# Patient Record
Sex: MALE | Race: WHITE | NOT HISPANIC OR LATINO | Employment: OTHER | URBAN - METROPOLITAN AREA
[De-identification: names, ages, dates, MRNs, and addresses within clinical notes are randomized per-mention and may not be internally consistent; named-entity substitution may affect disease eponyms.]

---

## 2017-11-17 ENCOUNTER — OFFICE VISIT (OUTPATIENT)
Dept: MEDICAL GROUP | Facility: PHYSICIAN GROUP | Age: 67
End: 2017-11-17
Payer: COMMERCIAL

## 2017-11-17 VITALS
DIASTOLIC BLOOD PRESSURE: 64 MMHG | BODY MASS INDEX: 32.62 KG/M2 | OXYGEN SATURATION: 97 % | WEIGHT: 233 LBS | HEART RATE: 72 BPM | RESPIRATION RATE: 16 BRPM | TEMPERATURE: 98.6 F | HEIGHT: 71 IN | SYSTOLIC BLOOD PRESSURE: 130 MMHG

## 2017-11-17 DIAGNOSIS — E78.00 PURE HYPERCHOLESTEROLEMIA: ICD-10-CM

## 2017-11-17 DIAGNOSIS — Z13.1 SCREENING FOR DIABETES MELLITUS: ICD-10-CM

## 2017-11-17 DIAGNOSIS — M21.619 BUNION OF GREAT TOE: ICD-10-CM

## 2017-11-17 DIAGNOSIS — G47.9 DIFFICULTY SLEEPING: ICD-10-CM

## 2017-11-17 DIAGNOSIS — M19.90 ARTHRITIS: ICD-10-CM

## 2017-11-17 DIAGNOSIS — Z87.828 HISTORY OF TRAUMA: ICD-10-CM

## 2017-11-17 DIAGNOSIS — R63.5 ABNORMAL WEIGHT GAIN: ICD-10-CM

## 2017-11-17 DIAGNOSIS — E66.9 OBESITY (BMI 30-39.9): ICD-10-CM

## 2017-11-17 PROCEDURE — 99203 OFFICE O/P NEW LOW 30 MIN: CPT | Performed by: FAMILY MEDICINE

## 2017-11-17 RX ORDER — SILDENAFIL 50 MG/1
50 TABLET, FILM COATED ORAL PRN
COMMUNITY
End: 2019-10-02

## 2017-11-17 RX ORDER — DIPHENHYDRAMINE HCL 25 MG
25 TABLET ORAL EVERY 6 HOURS PRN
COMMUNITY
End: 2019-12-07

## 2017-11-17 ASSESSMENT — PAIN SCALES - GENERAL: PAINLEVEL: NO PAIN

## 2017-11-17 ASSESSMENT — PATIENT HEALTH QUESTIONNAIRE - PHQ9: CLINICAL INTERPRETATION OF PHQ2 SCORE: 0

## 2017-11-17 NOTE — ASSESSMENT & PLAN NOTE
"We discussed patient's weight today. He tells me he has gained ~20-25 pounds in the last year. Current weight is 233 pounds and BMI is 32.5.  Patient goals: Ideal weight 210 pounds.  Diet: \"I eat like a teenager.\" He is not following a specific diet plain.  Exercise: Elliptical 30 minutes twice a week.  "
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
## 2017-11-17 NOTE — ASSESSMENT & PLAN NOTE
This is a chronic and stable issue for the patient. He often has difficulty sleeping due to pain. He will take diphenhydramine nightly to help. No grogginess the next day.

## 2017-11-17 NOTE — ASSESSMENT & PLAN NOTE
At the end of the visit, patient mentioned that he has a bunion that is getting larger on his left foot. He also has a smaller one on his right foot. Due to his previous injuries and surgeries, he does have some deformity of his ankle. He does not have significant pain at this time and like to avoid surgery for his long as possible. In the past, he has seen Dr. Blanc in regards to this issue.

## 2017-11-17 NOTE — LETTER
[facility]  Renita Guerra M.D.  [address]  [place] [address]  Fax: [phone]   Authorization for Release/Disclosure of   Protected Health Information   Name: ROB CLIFTON : 1950 SSN: [id number]   Address: [address] [place] [address] [address] [place] [address] Phone:    [phone] (home)    I authorize the entity listed below to release/disclose the PHI below to:   [facility]/Renita Guerra M.D. and Renita Guerra M.D.   Provider or Entity Name:  Dr. Candelario  [facility]    Address   City, State, Zip   Phone:      Fax:     Reason for request: continuity of care   Information to be released:    [  ] LAST COLONOSCOPY,  including any PATH REPORT and follow-up  [  ] LAST FIT/COLOGUARD RESULT [  ] LAST DEXA  [  ] LAST MAMMOGRAM  [  ] LAST PAP  [  ] LAST LABS [  ] RETINA EXAM REPORT  [  ] IMMUNIZATION RECORDS  [  ] Release all info      [  ] Check here and initial the line next to each item to release ALL health information INCLUDING  _____ Care and treatment for drug and / or alcohol abuse  _____ HIV testing, infection status, or AIDS  _____ Genetic Testing    DATES OF SERVICE OR TIME PERIOD TO BE DISCLOSED: _____________  I understand and acknowledge that:  * This Authorization may be revoked at any time by you in writing, except if your health information has already been used or disclosed.  * Your health information that will be used or disclosed as a result of you signing this authorization could be re-disclosed by the recipient. If this occurs, your re-disclosed health information may no longer be protected by State or Federal laws.  * You may refuse to sign this Authorization. Your refusal will not affect your ability to obtain treatment.  * This Authorization becomes effective upon signing and will  on (date) __________.      If no date is indicated, this Authorization will  one (1) year from the signature date.    Name: Rob Clifton    Signature:   Date:     2017            PLEASE FAX REQUESTED RECORDS BACK TO: [phone]

## 2017-11-17 NOTE — ASSESSMENT & PLAN NOTE
Per patient history. His cholesterol numbers have been slightly elevated in the past. Not currently on medication. No personal history of heart attack or stroke.

## 2017-11-30 ENCOUNTER — HOSPITAL ENCOUNTER (OUTPATIENT)
Dept: LAB | Facility: MEDICAL CENTER | Age: 67
End: 2017-11-30
Attending: FAMILY MEDICINE
Payer: COMMERCIAL

## 2017-11-30 DIAGNOSIS — E78.00 PURE HYPERCHOLESTEROLEMIA: ICD-10-CM

## 2017-11-30 DIAGNOSIS — R63.5 ABNORMAL WEIGHT GAIN: ICD-10-CM

## 2017-11-30 DIAGNOSIS — Z13.1 SCREENING FOR DIABETES MELLITUS: ICD-10-CM

## 2017-11-30 DIAGNOSIS — E66.9 OBESITY (BMI 30-39.9): ICD-10-CM

## 2017-11-30 LAB
ALBUMIN SERPL BCP-MCNC: 4 G/DL (ref 3.2–4.9)
ALBUMIN/GLOB SERPL: 1.4 G/DL
ALP SERPL-CCNC: 51 U/L (ref 30–99)
ALT SERPL-CCNC: 15 U/L (ref 2–50)
ANION GAP SERPL CALC-SCNC: 8 MMOL/L (ref 0–11.9)
AST SERPL-CCNC: 20 U/L (ref 12–45)
BILIRUB SERPL-MCNC: 1 MG/DL (ref 0.1–1.5)
BUN SERPL-MCNC: 21 MG/DL (ref 8–22)
CALCIUM SERPL-MCNC: 9.2 MG/DL (ref 8.5–10.5)
CHLORIDE SERPL-SCNC: 104 MMOL/L (ref 96–112)
CHOLEST SERPL-MCNC: 200 MG/DL (ref 100–199)
CO2 SERPL-SCNC: 28 MMOL/L (ref 20–33)
CREAT SERPL-MCNC: 1.16 MG/DL (ref 0.5–1.4)
GFR SERPL CREATININE-BSD FRML MDRD: >60 ML/MIN/1.73 M 2
GLOBULIN SER CALC-MCNC: 2.9 G/DL (ref 1.9–3.5)
GLUCOSE SERPL-MCNC: 92 MG/DL (ref 65–99)
HDLC SERPL-MCNC: 44 MG/DL
LDLC SERPL CALC-MCNC: 121 MG/DL
POTASSIUM SERPL-SCNC: 3.9 MMOL/L (ref 3.6–5.5)
PROT SERPL-MCNC: 6.9 G/DL (ref 6–8.2)
SODIUM SERPL-SCNC: 140 MMOL/L (ref 135–145)
TRIGL SERPL-MCNC: 176 MG/DL (ref 0–149)

## 2017-11-30 PROCEDURE — 80061 LIPID PANEL: CPT

## 2017-11-30 PROCEDURE — 36415 COLL VENOUS BLD VENIPUNCTURE: CPT

## 2017-11-30 PROCEDURE — 80053 COMPREHEN METABOLIC PANEL: CPT

## 2018-05-23 ENCOUNTER — OFFICE VISIT (OUTPATIENT)
Dept: MEDICAL GROUP | Facility: PHYSICIAN GROUP | Age: 68
End: 2018-05-23
Payer: COMMERCIAL

## 2018-05-23 VITALS
BODY MASS INDEX: 33.46 KG/M2 | DIASTOLIC BLOOD PRESSURE: 78 MMHG | HEIGHT: 71 IN | OXYGEN SATURATION: 97 % | HEART RATE: 76 BPM | TEMPERATURE: 98.6 F | WEIGHT: 239 LBS | SYSTOLIC BLOOD PRESSURE: 122 MMHG | RESPIRATION RATE: 18 BRPM

## 2018-05-23 DIAGNOSIS — R20.0 NUMBNESS AND TINGLING OF BOTH FEET: ICD-10-CM

## 2018-05-23 DIAGNOSIS — E66.9 OBESITY (BMI 30-39.9): ICD-10-CM

## 2018-05-23 DIAGNOSIS — Z87.828 HISTORY OF TRAUMA: ICD-10-CM

## 2018-05-23 DIAGNOSIS — R20.2 NUMBNESS AND TINGLING OF BOTH FEET: ICD-10-CM

## 2018-05-23 DIAGNOSIS — Z87.81 HISTORY OF FRACTURE OF LOWER EXTREMITY: ICD-10-CM

## 2018-05-23 PROCEDURE — 99214 OFFICE O/P EST MOD 30 MIN: CPT | Performed by: FAMILY MEDICINE

## 2018-05-23 ASSESSMENT — PAIN SCALES - GENERAL: PAINLEVEL: 3=SLIGHT PAIN
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
## 2018-05-31 ENCOUNTER — HOSPITAL ENCOUNTER (OUTPATIENT)
Dept: LAB | Facility: MEDICAL CENTER | Age: 68
End: 2018-05-31
Attending: FAMILY MEDICINE
Payer: COMMERCIAL

## 2018-05-31 DIAGNOSIS — R20.2 NUMBNESS AND TINGLING OF BOTH FEET: ICD-10-CM

## 2018-05-31 DIAGNOSIS — R20.0 NUMBNESS AND TINGLING OF BOTH FEET: ICD-10-CM

## 2018-05-31 PROBLEM — E53.8 VITAMIN B12 DEFICIENCY: Status: ACTIVE | Noted: 2018-05-31

## 2018-05-31 LAB
ALBUMIN SERPL BCP-MCNC: 4.2 G/DL (ref 3.2–4.9)
ALBUMIN/GLOB SERPL: 1.4 G/DL
ALP SERPL-CCNC: 49 U/L (ref 30–99)
ALT SERPL-CCNC: 14 U/L (ref 2–50)
ANION GAP SERPL CALC-SCNC: 8 MMOL/L (ref 0–11.9)
AST SERPL-CCNC: 16 U/L (ref 12–45)
BASOPHILS # BLD AUTO: 1.2 % (ref 0–1.8)
BASOPHILS # BLD: 0.08 K/UL (ref 0–0.12)
BILIRUB SERPL-MCNC: 1.1 MG/DL (ref 0.1–1.5)
BUN SERPL-MCNC: 19 MG/DL (ref 8–22)
CALCIUM SERPL-MCNC: 9 MG/DL (ref 8.5–10.5)
CHLORIDE SERPL-SCNC: 105 MMOL/L (ref 96–112)
CO2 SERPL-SCNC: 25 MMOL/L (ref 20–33)
CREAT SERPL-MCNC: 1.14 MG/DL (ref 0.5–1.4)
EOSINOPHIL # BLD AUTO: 0.15 K/UL (ref 0–0.51)
EOSINOPHIL NFR BLD: 2.2 % (ref 0–6.9)
ERYTHROCYTE [DISTWIDTH] IN BLOOD BY AUTOMATED COUNT: 41.1 FL (ref 35.9–50)
GLOBULIN SER CALC-MCNC: 2.9 G/DL (ref 1.9–3.5)
GLUCOSE SERPL-MCNC: 84 MG/DL (ref 65–99)
HCT VFR BLD AUTO: 47.6 % (ref 42–52)
HGB BLD-MCNC: 15.9 G/DL (ref 14–18)
IMM GRANULOCYTES # BLD AUTO: 0.03 K/UL (ref 0–0.11)
IMM GRANULOCYTES NFR BLD AUTO: 0.4 % (ref 0–0.9)
IRON SERPL-MCNC: 114 UG/DL (ref 50–180)
LYMPHOCYTES # BLD AUTO: 1.97 K/UL (ref 1–4.8)
LYMPHOCYTES NFR BLD: 29.3 % (ref 22–41)
MCH RBC QN AUTO: 29.6 PG (ref 27–33)
MCHC RBC AUTO-ENTMCNC: 33.4 G/DL (ref 33.7–35.3)
MCV RBC AUTO: 88.6 FL (ref 81.4–97.8)
MONOCYTES # BLD AUTO: 0.61 K/UL (ref 0–0.85)
MONOCYTES NFR BLD AUTO: 9.1 % (ref 0–13.4)
NEUTROPHILS # BLD AUTO: 3.89 K/UL (ref 1.82–7.42)
NEUTROPHILS NFR BLD: 57.8 % (ref 44–72)
NRBC # BLD AUTO: 0 K/UL
NRBC BLD-RTO: 0 /100 WBC
PLATELET # BLD AUTO: 266 K/UL (ref 164–446)
PMV BLD AUTO: 10.9 FL (ref 9–12.9)
POTASSIUM SERPL-SCNC: 4 MMOL/L (ref 3.6–5.5)
PROT SERPL-MCNC: 7.1 G/DL (ref 6–8.2)
RBC # BLD AUTO: 5.37 M/UL (ref 4.7–6.1)
SODIUM SERPL-SCNC: 138 MMOL/L (ref 135–145)
VIT B12 SERPL-MCNC: 178 PG/ML (ref 211–911)
WBC # BLD AUTO: 6.7 K/UL (ref 4.8–10.8)

## 2018-05-31 PROCEDURE — 83540 ASSAY OF IRON: CPT

## 2018-05-31 PROCEDURE — 85025 COMPLETE CBC W/AUTO DIFF WBC: CPT

## 2018-05-31 PROCEDURE — 80053 COMPREHEN METABOLIC PANEL: CPT

## 2018-05-31 PROCEDURE — 36415 COLL VENOUS BLD VENIPUNCTURE: CPT

## 2018-05-31 PROCEDURE — 82607 VITAMIN B-12: CPT

## 2018-06-15 ENCOUNTER — HOSPITAL ENCOUNTER (OUTPATIENT)
Dept: RADIOLOGY | Facility: MEDICAL CENTER | Age: 68
End: 2018-06-15
Attending: FAMILY MEDICINE
Payer: COMMERCIAL

## 2018-06-15 ENCOUNTER — TELEPHONE (OUTPATIENT)
Dept: MEDICAL GROUP | Facility: PHYSICIAN GROUP | Age: 68
End: 2018-06-15

## 2018-06-15 DIAGNOSIS — Z87.81 HISTORY OF FRACTURE OF LOWER EXTREMITY: ICD-10-CM

## 2018-06-15 DIAGNOSIS — Z87.828 HISTORY OF TRAUMA: ICD-10-CM

## 2018-06-15 DIAGNOSIS — R20.0 NUMBNESS AND TINGLING OF BOTH FEET: ICD-10-CM

## 2018-06-15 DIAGNOSIS — R20.2 NUMBNESS AND TINGLING OF BOTH FEET: ICD-10-CM

## 2018-06-15 PROCEDURE — 73630 X-RAY EXAM OF FOOT: CPT | Mod: RT

## 2018-06-15 PROCEDURE — 73590 X-RAY EXAM OF LOWER LEG: CPT | Mod: LT

## 2018-06-15 PROCEDURE — 73610 X-RAY EXAM OF ANKLE: CPT | Mod: LT

## 2018-06-25 ENCOUNTER — OFFICE VISIT (OUTPATIENT)
Dept: MEDICAL GROUP | Facility: PHYSICIAN GROUP | Age: 68
End: 2018-06-25
Payer: COMMERCIAL

## 2018-06-25 VITALS
SYSTOLIC BLOOD PRESSURE: 98 MMHG | RESPIRATION RATE: 16 BRPM | WEIGHT: 236 LBS | HEART RATE: 82 BPM | BODY MASS INDEX: 33.04 KG/M2 | TEMPERATURE: 98.6 F | OXYGEN SATURATION: 97 % | HEIGHT: 71 IN | DIASTOLIC BLOOD PRESSURE: 70 MMHG

## 2018-06-25 DIAGNOSIS — D16.9 ENCHONDROMA OF BONE: ICD-10-CM

## 2018-06-25 DIAGNOSIS — M19.90 ARTHRITIS: ICD-10-CM

## 2018-06-25 DIAGNOSIS — S82.491S: ICD-10-CM

## 2018-06-25 DIAGNOSIS — Z87.828 HISTORY OF TRAUMA: ICD-10-CM

## 2018-06-25 DIAGNOSIS — R20.0 NUMBNESS AND TINGLING OF BOTH FEET: ICD-10-CM

## 2018-06-25 DIAGNOSIS — M79.605 PAIN OF LEFT LOWER EXTREMITY: ICD-10-CM

## 2018-06-25 DIAGNOSIS — T84.84XA PAINFUL ORTHOPAEDIC HARDWARE (HCC): ICD-10-CM

## 2018-06-25 DIAGNOSIS — R20.2 NUMBNESS AND TINGLING OF BOTH FEET: ICD-10-CM

## 2018-06-25 DIAGNOSIS — Z87.81 HISTORY OF FRACTURE OF LOWER EXTREMITY: ICD-10-CM

## 2018-06-25 PROCEDURE — 99214 OFFICE O/P EST MOD 30 MIN: CPT | Performed by: FAMILY MEDICINE

## 2018-06-25 RX ORDER — CHOLECALCIFEROL (VITAMIN D3) 25 MCG
TABLET,CHEWABLE ORAL
COMMUNITY
End: 2019-12-07

## 2018-06-25 ASSESSMENT — PAIN SCALES - GENERAL: PAINLEVEL: NO PAIN
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
## 2018-11-30 ENCOUNTER — HOSPITAL ENCOUNTER (OUTPATIENT)
Dept: LAB | Facility: MEDICAL CENTER | Age: 68
End: 2018-11-30
Attending: FAMILY MEDICINE
Payer: COMMERCIAL

## 2018-11-30 ENCOUNTER — OFFICE VISIT (OUTPATIENT)
Dept: MEDICAL GROUP | Facility: PHYSICIAN GROUP | Age: 68
End: 2018-11-30
Payer: COMMERCIAL

## 2018-11-30 VITALS
DIASTOLIC BLOOD PRESSURE: 84 MMHG | TEMPERATURE: 99.1 F | HEIGHT: 71 IN | RESPIRATION RATE: 14 BRPM | BODY MASS INDEX: 29.4 KG/M2 | SYSTOLIC BLOOD PRESSURE: 122 MMHG | WEIGHT: 210 LBS | HEART RATE: 76 BPM | OXYGEN SATURATION: 98 %

## 2018-11-30 DIAGNOSIS — E53.8 VITAMIN B12 DEFICIENCY: ICD-10-CM

## 2018-11-30 DIAGNOSIS — Z00.00 ENCOUNTER FOR PREVENTATIVE ADULT HEALTH CARE EXAMINATION: ICD-10-CM

## 2018-11-30 DIAGNOSIS — J34.89 LESION OF NOSE: ICD-10-CM

## 2018-11-30 DIAGNOSIS — Z23 NEED FOR VACCINATION: ICD-10-CM

## 2018-11-30 PROBLEM — E66.9 OBESITY (BMI 30-39.9): Status: RESOLVED | Noted: 2017-11-17 | Resolved: 2018-11-30

## 2018-11-30 PROBLEM — R63.5 ABNORMAL WEIGHT GAIN: Status: RESOLVED | Noted: 2017-11-17 | Resolved: 2018-11-30

## 2018-11-30 PROBLEM — G47.9 DIFFICULTY SLEEPING: Status: RESOLVED | Noted: 2017-11-17 | Resolved: 2018-11-30

## 2018-11-30 LAB
ALBUMIN SERPL BCP-MCNC: 4.3 G/DL (ref 3.2–4.9)
ALBUMIN/GLOB SERPL: 1.6 G/DL
ALP SERPL-CCNC: 52 U/L (ref 30–99)
ALT SERPL-CCNC: 13 U/L (ref 2–50)
ANION GAP SERPL CALC-SCNC: 5 MMOL/L (ref 0–11.9)
AST SERPL-CCNC: 15 U/L (ref 12–45)
BILIRUB SERPL-MCNC: 1 MG/DL (ref 0.1–1.5)
BUN SERPL-MCNC: 19 MG/DL (ref 8–22)
CALCIUM SERPL-MCNC: 9.1 MG/DL (ref 8.5–10.5)
CHLORIDE SERPL-SCNC: 105 MMOL/L (ref 96–112)
CHOLEST SERPL-MCNC: 180 MG/DL (ref 100–199)
CO2 SERPL-SCNC: 29 MMOL/L (ref 20–33)
CREAT SERPL-MCNC: 0.76 MG/DL (ref 0.5–1.4)
FASTING STATUS PATIENT QL REPORTED: NORMAL
GLOBULIN SER CALC-MCNC: 2.7 G/DL (ref 1.9–3.5)
GLUCOSE SERPL-MCNC: 84 MG/DL (ref 65–99)
HDLC SERPL-MCNC: 42 MG/DL
LDLC SERPL CALC-MCNC: 116 MG/DL
POTASSIUM SERPL-SCNC: 4.3 MMOL/L (ref 3.6–5.5)
PROT SERPL-MCNC: 7 G/DL (ref 6–8.2)
SODIUM SERPL-SCNC: 139 MMOL/L (ref 135–145)
TRIGL SERPL-MCNC: 110 MG/DL (ref 0–149)
VIT B12 SERPL-MCNC: 559 PG/ML (ref 211–911)

## 2018-11-30 PROCEDURE — 90670 PCV13 VACCINE IM: CPT | Performed by: FAMILY MEDICINE

## 2018-11-30 PROCEDURE — 90662 IIV NO PRSV INCREASED AG IM: CPT | Performed by: FAMILY MEDICINE

## 2018-11-30 PROCEDURE — 82607 VITAMIN B-12: CPT

## 2018-11-30 PROCEDURE — 80053 COMPREHEN METABOLIC PANEL: CPT

## 2018-11-30 PROCEDURE — 80061 LIPID PANEL: CPT

## 2018-11-30 PROCEDURE — 90471 IMMUNIZATION ADMIN: CPT | Performed by: FAMILY MEDICINE

## 2018-11-30 PROCEDURE — 99397 PER PM REEVAL EST PAT 65+ YR: CPT | Mod: 25 | Performed by: FAMILY MEDICINE

## 2018-11-30 PROCEDURE — 36415 COLL VENOUS BLD VENIPUNCTURE: CPT

## 2018-11-30 PROCEDURE — 90472 IMMUNIZATION ADMIN EACH ADD: CPT | Performed by: FAMILY MEDICINE

## 2018-11-30 ASSESSMENT — PATIENT HEALTH QUESTIONNAIRE - PHQ9: CLINICAL INTERPRETATION OF PHQ2 SCORE: 0
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
## 2019-10-01 ENCOUNTER — PATIENT MESSAGE (OUTPATIENT)
Dept: MEDICAL GROUP | Facility: PHYSICIAN GROUP | Age: 69
End: 2019-10-01

## 2019-10-01 DIAGNOSIS — N52.8 OTHER MALE ERECTILE DYSFUNCTION: ICD-10-CM

## 2019-10-02 RX ORDER — SILDENAFIL 100 MG/1
100 TABLET, FILM COATED ORAL PRN
Qty: 100 TAB | Refills: 0 | Status: SHIPPED | OUTPATIENT
Start: 2019-10-02 | End: 2019-10-21 | Stop reason: SDUPTHER

## 2019-10-18 ENCOUNTER — TELEPHONE (OUTPATIENT)
Dept: MEDICAL GROUP | Facility: PHYSICIAN GROUP | Age: 69
End: 2019-10-18

## 2019-10-18 NOTE — TELEPHONE ENCOUNTER
Pharmacy called requesting a faxed Rx for viagra for pt.  Dr. Guerra had discussed this Rx with pt, and left a script at the front of the office for pt to .  It was picked up.    Called and left VM for pt to call back in regards to this Rx, he will need to drop off the old script for us to fax a new script to pharmacy.

## 2019-10-21 ENCOUNTER — TELEPHONE (OUTPATIENT)
Dept: MEDICAL GROUP | Facility: PHYSICIAN GROUP | Age: 69
End: 2019-10-21

## 2019-10-21 DIAGNOSIS — N52.8 OTHER MALE ERECTILE DYSFUNCTION: ICD-10-CM

## 2019-10-21 RX ORDER — SILDENAFIL 100 MG/1
100 TABLET, FILM COATED ORAL PRN
Qty: 100 TAB | Refills: 0 | Status: SHIPPED
Start: 2019-10-21 | End: 2020-10-13 | Stop reason: SDUPTHER

## 2019-10-21 NOTE — TELEPHONE ENCOUNTER
Received a call from [facility], wanting a new Rx faxed over to fill for pt.      Pt has already picked up previous Rx and has that on hand.    Pt was advised that he would need to bring Rx back, if he is wanting to use [facility] and needing us to fax a new Rx to them. Wife brought in old prescription to return and was advised by Rahel to keep it.

## 2019-10-21 NOTE — TELEPHONE ENCOUNTER
Okay to reprint prescription and send in.  Please fax to [facility].  -Renita Guerra M.D.

## 2019-10-21 NOTE — TELEPHONE ENCOUNTER
I'm not familiar with [facility] and have not seen any documentation from them.  Can we call the number to see what is going on?  Patient picked up the prescription for Viagra last week or the week prior.  -Renita Guerra M.D.

## 2019-10-21 NOTE — TELEPHONE ENCOUNTER
Pt wife is here because the pharmacy will not fill the prescription for viagra.  needs to contact the company at [facility] [phone] or Fax at [phone] they will not fill the recent one until the Dr gives them a call. Please call pt with any questions OK to leave a detailed message if no answer. Pt does not want to use any other pharmacy

## 2019-12-06 ENCOUNTER — OFFICE VISIT (OUTPATIENT)
Dept: MEDICAL GROUP | Facility: PHYSICIAN GROUP | Age: 69
End: 2019-12-06
Payer: COMMERCIAL

## 2019-12-06 VITALS
OXYGEN SATURATION: 98 % | RESPIRATION RATE: 16 BRPM | HEART RATE: 70 BPM | SYSTOLIC BLOOD PRESSURE: 102 MMHG | TEMPERATURE: 97.5 F | DIASTOLIC BLOOD PRESSURE: 78 MMHG | WEIGHT: 199.4 LBS | BODY MASS INDEX: 27.92 KG/M2 | HEIGHT: 71 IN

## 2019-12-06 DIAGNOSIS — Z13.220 SCREENING FOR LIPID DISORDERS: ICD-10-CM

## 2019-12-06 DIAGNOSIS — Z23 NEED FOR VACCINATION: ICD-10-CM

## 2019-12-06 DIAGNOSIS — Z00.00 ENCOUNTER FOR PREVENTATIVE ADULT HEALTH CARE EXAMINATION: ICD-10-CM

## 2019-12-06 DIAGNOSIS — Z13.1 SCREENING FOR DIABETES MELLITUS: ICD-10-CM

## 2019-12-06 DIAGNOSIS — H04.123 DRY EYE SYNDROME OF BOTH EYES: ICD-10-CM

## 2019-12-06 DIAGNOSIS — Z11.59 ENCOUNTER FOR HEPATITIS C SCREENING TEST FOR LOW RISK PATIENT: ICD-10-CM

## 2019-12-06 DIAGNOSIS — Z87.19 HISTORY OF HEMORRHOIDS: ICD-10-CM

## 2019-12-06 DIAGNOSIS — E53.8 VITAMIN B12 DEFICIENCY: ICD-10-CM

## 2019-12-06 PROCEDURE — 90662 IIV NO PRSV INCREASED AG IM: CPT | Performed by: FAMILY MEDICINE

## 2019-12-06 PROCEDURE — 99397 PER PM REEVAL EST PAT 65+ YR: CPT | Mod: 25 | Performed by: FAMILY MEDICINE

## 2019-12-06 PROCEDURE — 90732 PPSV23 VACC 2 YRS+ SUBQ/IM: CPT | Performed by: FAMILY MEDICINE

## 2019-12-06 PROCEDURE — 90472 IMMUNIZATION ADMIN EACH ADD: CPT | Performed by: FAMILY MEDICINE

## 2019-12-06 PROCEDURE — 90471 IMMUNIZATION ADMIN: CPT | Performed by: FAMILY MEDICINE

## 2019-12-06 ASSESSMENT — PATIENT HEALTH QUESTIONNAIRE - PHQ9: CLINICAL INTERPRETATION OF PHQ2 SCORE: 0

## 2019-12-06 NOTE — PROGRESS NOTES
Subjective:     CC:   Chief Complaint   Patient presents with   • Annual Exam     HPI:   Rob Clifton is a 69 y.o. male who presents for an annual exam. He is feeling well and has no complaints.    Encounter for preventative adult health care examination  Last colonoscopy: 07/2015 with [facility] revealing diverticulosis in the entire examined colon. Non-bleeding internal hemorrhoids. Repeat in ten years.  Last Tdap: 2014   Hx STDs: Denies.  Recent STD test: No  Regular exercise: Yes  Diet: Patient is following the Weight Watchers program and has reportedly lost approximately 47 lbs!    History of hemorrhoids  The patient experienced an episode of hemorrhoids described as nontender swelling around his anus several months ago. The swelling resolved without intervention after two months. Patient denies constipation or straining during bowel movements prior to the development of his symptoms. No prior history of hemorrhoids.    Dry eye syndrome of both eyes  The patient is being monitored by Optometry for a history of dry eyes. He is treating his symptoms with daily eye drops. He otherwise denies vision changes, eye redness or discharge.    Vitamin B12 deficiency  Patient is monitored for a prior history of vitamin B12 deficiency noted on his labs dated 05/31/18. He reportedly takes a daily B12 supplement and is asymptomatic.    He  has a past medical history of Achilles tendon infection, Arthritis, and Kidney stones.  He  has a past surgical history that includes other orthopedic surgery.  Family History   Problem Relation Age of Onset   • No Known Problems Mother    • No Known Problems Father    • Other Maternal Grandmother         Possibly TB   • No Known Problems Maternal Grandfather    • No Known Problems Paternal Grandmother    • No Known Problems Paternal Grandfather    • Heart Disease Neg Hx    • Cancer Neg Hx    • Diabetes Neg Hx    • Stroke Neg Hx      Social History     Tobacco Use   •  "Smoking status: Never Smoker   • Smokeless tobacco: Never Used   Substance Use Topics   • Alcohol use: Yes     Alcohol/week: 1.2 oz     Types: 2 Glasses of wine per week   • Drug use: Yes     Types: Marijuana     Comment: Rare     Patient Active Problem List    Diagnosis Date Noted   • Vitamin B12 deficiency 05/31/2018   • Pure hypercholesterolemia 11/17/2017   • History of trauma 11/17/2017   • Arthritis 11/17/2017   • Bunion of great toe 11/17/2017     Current Outpatient Medications   Medication Sig Dispense Refill   • Doxylamine Succinate, Sleep, (SLEEP AID PO) Take  by mouth at bedtime as needed. [facility] Sleep Aid PRN     • sildenafil citrate (VIAGRA) 100 MG tablet Take 1 Tab by mouth as needed for Erectile Dysfunction. 100 Tab 0   • NAPROXEN PO Take 500 mg by mouth every day.     • Omega-3 Fatty Acids (FISH OIL) 1200 MG Cap Take  by mouth 2 Times a Day.     • Misc Natural Products (LUTEIN 20 PO) Take 25 mg PE by mouth every day.       No current facility-administered medications for this visit.     (including changes today)  Allergies: Patient has no known allergies.    Review of Systems   Constitutional: Negative for fever, chills and malaise/fatigue.   HENT: Negative for congestion.    Eyes: Negative for pain.   Respiratory: Negative for cough and shortness of breath.    Cardiovascular: Negative for leg swelling.   Gastrointestinal: Negative for nausea, vomiting, abdominal pain and diarrhea.   Genitourinary: Negative for dysuria and hematuria.   Skin: Negative for rash.   Neurological: Negative for dizziness, focal weakness and headaches.   Endo/Heme/Allergies: Does not bruise/bleed easily.   Psychiatric/Behavioral: Negative for depression.  The patient is not nervous/anxious.      Objective:     /78 (BP Location: Left arm, Patient Position: Sitting, BP Cuff Size: Adult)   Pulse 70   Temp 36.4 °C (97.5 °F) (Temporal)   Resp 16   Ht 1.803 m (5' 11\")   Wt 90.4 kg (199 lb 6.4 oz)   SpO2 98%   BMI 27.81 " kg/m²   Body mass index is 27.81 kg/m².  Wt Readings from Last 4 Encounters:   12/06/19 90.4 kg (199 lb 6.4 oz)   11/30/18 95.3 kg (210 lb)   06/25/18 107 kg (236 lb)   05/23/18 108.4 kg (239 lb)     Physical Exam:  Constitutional: Well-developed and well-nourished. Not diaphoretic. No distress.   Skin: Skin is warm and dry. No rash noted.  Head: Atraumatic without lesions.  Eyes: Conjunctivae and extraocular motions are normal. Pupils are equal, round, and reactive to light. No scleral icterus.   Ears:  External ears unremarkable. Tympanic membranes clear and intact.  Nose: Nares patent. Septum midline. Turbinates without erythema nor edema. No discharge.   Mouth/Throat: Dentition is normal. Tongue normal. Oropharynx is clear and moist. Posterior pharynx without erythema or exudates.  Neck: Supple, trachea midline. Normal range of motion. No thyromegaly present. No lymphadenopathy--cervical or supraclavicular.  Cardiovascular: Regular rate and rhythm, S1 and S2 without murmur, rubs, or gallops.    Chest: Effort normal. Clear to auscultation throughout. No adventitious sounds. No CVA tenderness.  Abdomen: Soft, non tender, and without distention. Active bowel sounds in all four quadrants. No rebound, guarding, masses or HSM.  Extremities: No cyanosis, clubbing, erythema, nor edema. Distal pulses intact and symmetric.   Musculoskeletal: All major joints AROM full in all directions without pain.  Neurological: Alert and oriented x 3. No cranial nerve deficit. Sensation intact.   Psychiatric:  Behavior, mood, and affect are appropriate.    Assessment and Plan:     1. Encounter for preventative adult health care examination  This is a 69 year old male here today for a preventative exam.  Previous medical history, healthcare maintenance and immunization status reviewed.  Patient is up to date. Annual labwork ordered. See discussion of anticipatory guidance below. Patient will return annually for preventative exams.  -  HEP C VIRUS ANTIBODY; Future  - Lipid Profile; Future  - Comp Metabolic Panel; Future    2. History of hemorrhoids  New diagnosis, now resolved. He experienced an episode of hemorrhoids which has since resolved. Patient was educated on the development of hemorrhoids including constipation and straining while passing a bowel movement. He was encouraged to increase his water intake and prevent constipation with an increase in fiber intake or laxative use.     3. Dry eye syndrome of both eyes  Plan to continue current eye drops prescribed by Optometry.     4. Vitamin B12 deficiency  Initially diagnosed in 05/2018. Vitamin B12 was normal in 11/2018 with daily supplementation. Plan to continue monitoring with repeat labs.  - VITAMIN B12; Future    5. Need for vaccination  The following vaccines were administered in office after the risks and benefits of the vaccine were discussed.  - INFLUENZA VACCINE, HIGH DOSE (65+ ONLY)  - Pneumococal Polysaccharide Vaccine 23-Valent =>1YO SQ/IM    6. Encounter for hepatitis C screening test for low risk patient  The patient was born between 1943 and 1963. He is due for hepatitis C screening  - HEP C VIRUS ANTIBODY; Future    7. Screening for lipid disorders  Plan to complete lipid testing to evaluate for any abnormalities.  - Lipid Profile; Future    8. Screening for diabetes mellitus  Plan to complete laboratory testing to screen for diabetes.   - Comp Metabolic Panel; Future     HCM: As noted above. Up to date.  Labs per orders.  Vaccinations per orders.  Counseling about diet, supplements, exercise, skin care and safe sex.    Follow-up: Return in about 1 year (around 12/6/2020) for Annual, Short.    Manisha WOMACK (Scribe), am scribing for, and in the presence of, Renita Guerra MD.    Electronically signed by: Manisha Tian (Scribe), 12/6/2019    Renita WOMACK MD personally performed the services described in this documentation, as scribed by Manisha Tian in my presence,  and it is both accurate and complete.

## 2019-12-13 ENCOUNTER — HOSPITAL ENCOUNTER (OUTPATIENT)
Dept: LAB | Facility: MEDICAL CENTER | Age: 69
End: 2019-12-13
Attending: FAMILY MEDICINE
Payer: COMMERCIAL

## 2019-12-13 DIAGNOSIS — Z13.220 SCREENING FOR LIPID DISORDERS: ICD-10-CM

## 2019-12-13 DIAGNOSIS — E53.8 VITAMIN B12 DEFICIENCY: ICD-10-CM

## 2019-12-13 DIAGNOSIS — Z11.59 ENCOUNTER FOR HEPATITIS C SCREENING TEST FOR LOW RISK PATIENT: ICD-10-CM

## 2019-12-13 DIAGNOSIS — Z00.00 ENCOUNTER FOR PREVENTATIVE ADULT HEALTH CARE EXAMINATION: ICD-10-CM

## 2019-12-13 DIAGNOSIS — Z13.1 SCREENING FOR DIABETES MELLITUS: ICD-10-CM

## 2019-12-13 LAB
ALBUMIN SERPL BCP-MCNC: 4.4 G/DL (ref 3.2–4.9)
ALBUMIN/GLOB SERPL: 1.5 G/DL
ALP SERPL-CCNC: 45 U/L (ref 30–99)
ALT SERPL-CCNC: 9 U/L (ref 2–50)
ANION GAP SERPL CALC-SCNC: 9 MMOL/L (ref 0–11.9)
AST SERPL-CCNC: 16 U/L (ref 12–45)
BILIRUB SERPL-MCNC: 0.7 MG/DL (ref 0.1–1.5)
BUN SERPL-MCNC: 26 MG/DL (ref 8–22)
CALCIUM SERPL-MCNC: 9.5 MG/DL (ref 8.5–10.5)
CHLORIDE SERPL-SCNC: 107 MMOL/L (ref 96–112)
CHOLEST SERPL-MCNC: 183 MG/DL (ref 100–199)
CO2 SERPL-SCNC: 25 MMOL/L (ref 20–33)
CREAT SERPL-MCNC: 1.23 MG/DL (ref 0.5–1.4)
FASTING STATUS PATIENT QL REPORTED: NORMAL
GLOBULIN SER CALC-MCNC: 2.9 G/DL (ref 1.9–3.5)
GLUCOSE SERPL-MCNC: 86 MG/DL (ref 65–99)
HDLC SERPL-MCNC: 49 MG/DL
LDLC SERPL CALC-MCNC: 119 MG/DL
POTASSIUM SERPL-SCNC: 4.4 MMOL/L (ref 3.6–5.5)
PROT SERPL-MCNC: 7.3 G/DL (ref 6–8.2)
SODIUM SERPL-SCNC: 141 MMOL/L (ref 135–145)
TRIGL SERPL-MCNC: 77 MG/DL (ref 0–149)

## 2019-12-13 PROCEDURE — 36415 COLL VENOUS BLD VENIPUNCTURE: CPT

## 2019-12-13 PROCEDURE — 80053 COMPREHEN METABOLIC PANEL: CPT

## 2019-12-13 PROCEDURE — 86803 HEPATITIS C AB TEST: CPT

## 2019-12-13 PROCEDURE — 80061 LIPID PANEL: CPT

## 2019-12-13 PROCEDURE — 82607 VITAMIN B-12: CPT

## 2019-12-14 LAB
HCV AB SER QL: NEGATIVE
VIT B12 SERPL-MCNC: 168 PG/ML (ref 211–911)

## 2019-12-15 DIAGNOSIS — R79.9 ELEVATED BUN: ICD-10-CM

## 2019-12-15 DIAGNOSIS — E53.8 VITAMIN B12 DEFICIENCY: ICD-10-CM

## 2020-02-12 ENCOUNTER — OFFICE VISIT (OUTPATIENT)
Dept: MEDICAL GROUP | Facility: PHYSICIAN GROUP | Age: 70
End: 2020-02-12
Payer: COMMERCIAL

## 2020-02-12 VITALS
SYSTOLIC BLOOD PRESSURE: 112 MMHG | HEIGHT: 71 IN | HEART RATE: 60 BPM | BODY MASS INDEX: 28.14 KG/M2 | WEIGHT: 201 LBS | TEMPERATURE: 97.3 F | OXYGEN SATURATION: 96 % | RESPIRATION RATE: 16 BRPM | DIASTOLIC BLOOD PRESSURE: 62 MMHG

## 2020-02-12 DIAGNOSIS — M70.61 TROCHANTERIC BURSITIS OF RIGHT HIP: ICD-10-CM

## 2020-02-12 DIAGNOSIS — Z87.828 HISTORY OF TRAUMA: ICD-10-CM

## 2020-02-12 DIAGNOSIS — S90.512A ABRASION OF LEFT ANKLE, INITIAL ENCOUNTER: ICD-10-CM

## 2020-02-12 DIAGNOSIS — H43.392 VITREOUS FLOATERS OF LEFT EYE: ICD-10-CM

## 2020-02-12 DIAGNOSIS — Z98.890 HISTORY OF MAJOR ORTHOPEDIC SURGERY: ICD-10-CM

## 2020-02-12 PROCEDURE — 99213 OFFICE O/P EST LOW 20 MIN: CPT | Performed by: FAMILY MEDICINE

## 2020-02-12 ASSESSMENT — PATIENT HEALTH QUESTIONNAIRE - PHQ9: CLINICAL INTERPRETATION OF PHQ2 SCORE: 0
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
## 2020-02-24 ENCOUNTER — OFFICE VISIT (OUTPATIENT)
Dept: MEDICAL GROUP | Facility: PHYSICIAN GROUP | Age: 70
End: 2020-02-24
Payer: COMMERCIAL

## 2020-02-24 VITALS
BODY MASS INDEX: 27.86 KG/M2 | HEIGHT: 71 IN | SYSTOLIC BLOOD PRESSURE: 140 MMHG | OXYGEN SATURATION: 97 % | RESPIRATION RATE: 14 BRPM | DIASTOLIC BLOOD PRESSURE: 78 MMHG | WEIGHT: 199 LBS | HEART RATE: 78 BPM | TEMPERATURE: 97.9 F

## 2020-02-24 DIAGNOSIS — M54.42 ACUTE LEFT-SIDED LOW BACK PAIN WITH LEFT-SIDED SCIATICA: ICD-10-CM

## 2020-02-24 DIAGNOSIS — Z87.828 HISTORY OF TRAUMA: ICD-10-CM

## 2020-02-24 PROCEDURE — 99214 OFFICE O/P EST MOD 30 MIN: CPT | Performed by: FAMILY MEDICINE

## 2020-02-24 RX ORDER — MELOXICAM 15 MG/1
15 TABLET ORAL DAILY
Qty: 14 TAB | Refills: 0 | Status: SHIPPED | OUTPATIENT
Start: 2020-02-24 | End: 2020-03-09

## 2020-02-24 RX ORDER — GABAPENTIN 300 MG/1
300 CAPSULE ORAL
Qty: 30 CAP | Refills: 0 | Status: SHIPPED | OUTPATIENT
Start: 2020-02-24 | End: 2020-03-25

## 2020-02-24 RX ORDER — TRAMADOL HYDROCHLORIDE 50 MG/1
100 TABLET ORAL EVERY 4 HOURS PRN
COMMUNITY
End: 2020-04-08

## 2020-02-24 ASSESSMENT — PAIN SCALES - GENERAL: PAINLEVEL: 9=SEVERE PAIN
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
## 2020-02-24 NOTE — Clinical Note
Zacarias Melgar and Raghu! Hoping one of you could see my very pleasant patient with sciatica.  Thanks! -Ariadne

## 2020-02-25 ENCOUNTER — PATIENT MESSAGE (OUTPATIENT)
Dept: MEDICAL GROUP | Facility: PHYSICIAN GROUP | Age: 70
End: 2020-02-25

## 2020-02-25 DIAGNOSIS — M54.42 ACUTE LEFT-SIDED LOW BACK PAIN WITH LEFT-SIDED SCIATICA: ICD-10-CM

## 2020-02-26 ENCOUNTER — HOSPITAL ENCOUNTER (OUTPATIENT)
Dept: RADIOLOGY | Facility: MEDICAL CENTER | Age: 70
End: 2020-02-26
Attending: FAMILY MEDICINE
Payer: COMMERCIAL

## 2020-02-26 DIAGNOSIS — M54.42 ACUTE LEFT-SIDED LOW BACK PAIN WITH LEFT-SIDED SCIATICA: ICD-10-CM

## 2020-02-26 PROCEDURE — 72100 X-RAY EXAM L-S SPINE 2/3 VWS: CPT
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
## 2020-03-02 ENCOUNTER — PATIENT MESSAGE (OUTPATIENT)
Dept: MEDICAL GROUP | Facility: PHYSICIAN GROUP | Age: 70
End: 2020-03-02

## 2020-03-02 DIAGNOSIS — Z87.828 HISTORY OF TRAUMA: ICD-10-CM

## 2020-03-02 DIAGNOSIS — M54.42 ACUTE LEFT-SIDED LOW BACK PAIN WITH LEFT-SIDED SCIATICA: ICD-10-CM

## 2020-03-03 NOTE — PATIENT COMMUNICATION
Please check with these offices.  I believe I had placed another PT referral to [facility].  -Renita Guerra M.D.

## 2020-03-11 ENCOUNTER — TELEPHONE (OUTPATIENT)
Dept: PHYSICAL MEDICINE AND REHAB | Facility: MEDICAL CENTER | Age: 70
End: 2020-03-11
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
## 2020-03-16 ENCOUNTER — PATIENT MESSAGE (OUTPATIENT)
Dept: MEDICAL GROUP | Facility: PHYSICIAN GROUP | Age: 70
End: 2020-03-16

## 2020-03-17 ENCOUNTER — PATIENT MESSAGE (OUTPATIENT)
Dept: MEDICAL GROUP | Facility: PHYSICIAN GROUP | Age: 70
End: 2020-03-17

## 2020-03-17 ENCOUNTER — PHYSICAL THERAPY (OUTPATIENT)
Dept: PHYSICAL THERAPY | Facility: REHABILITATION | Age: 70
End: 2020-03-17
Attending: FAMILY MEDICINE
Payer: COMMERCIAL

## 2020-03-17 DIAGNOSIS — T14.8XXA OPEN WOUND: ICD-10-CM

## 2020-03-17 DIAGNOSIS — M54.42 ACUTE LEFT-SIDED LOW BACK PAIN WITH LEFT-SIDED SCIATICA: ICD-10-CM

## 2020-03-17 DIAGNOSIS — S91.309A WOUND OF FOOT: ICD-10-CM

## 2020-03-17 PROCEDURE — 97110 THERAPEUTIC EXERCISES: CPT

## 2020-03-17 PROCEDURE — 97162 PT EVAL MOD COMPLEX 30 MIN: CPT
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
## 2020-03-18 ENCOUNTER — PATIENT MESSAGE (OUTPATIENT)
Dept: MEDICAL GROUP | Facility: PHYSICIAN GROUP | Age: 70
End: 2020-03-18

## 2020-03-18 DIAGNOSIS — T14.8XXA OPEN WOUND: ICD-10-CM

## 2020-03-18 DIAGNOSIS — S91.309A WOUND OF FOOT: ICD-10-CM

## 2020-03-18 RX ORDER — SULFAMETHOXAZOLE AND TRIMETHOPRIM 800; 160 MG/1; MG/1
1 TABLET ORAL 2 TIMES DAILY
Qty: 20 TAB | Refills: 0 | Status: SHIPPED | OUTPATIENT
Start: 2020-03-18 | End: 2020-03-19 | Stop reason: SDUPTHER

## 2020-03-18 RX ORDER — SULFAMETHOXAZOLE AND TRIMETHOPRIM 800; 160 MG/1; MG/1
1 TABLET ORAL 2 TIMES DAILY
Qty: 20 TAB | Refills: 0 | Status: SHIPPED | OUTPATIENT
Start: 2020-03-18 | End: 2020-03-18 | Stop reason: SDUPTHER

## 2020-03-18 NOTE — TELEPHONE ENCOUNTER
From: Rob Clifton  To: CAR Strauss  Sent: 3/18/2020 11:49 AM PDT  Subject: Non-Urgent Medical Question    Thanks Annette. Will you send the Rx to [facility] or do I need to pick it up at your office?  Looking forward to receiving the wound care referral.   Burton      ----- Message -----   From:CAR Strauss   Sent:3/18/2020 10:04 AM PDT   To:Rob Clifton   Subject:RE: Non-Urgent Medical Question    Hi Burton,     I was contacted by your physical therapist with concerns about the wound and with the picture I think we need to get wound care involved. I will send in an urgent referral. I am going to start you on an antibiotic in the mean time. Please let us know if you need anything.     CRISTIAN Miramontes  Family Medicine   Covering for Renita Guerra M.D.       ----- Message -----   From:Rob Clifton   Sent:3/17/2020 6:03 PM PDT   To:CAR Strauss   Subject:Non-Urgent Medical Question    Annette,  Attached is a picture of my left achilles tendon. I am concerned that this is an open wound now. Please advise my next action.  Thanks.  Burton      ----- Message -----   From:CAR Strauss   Sent:3/17/2020 8:01 AM PDT   To:Rob Clifton   Subject:RE: Non-Urgent Medical Question    Zacarias Manrique,     Is there any redness, warmth or drainage? Are you able to send a picture? We may need to try an antibiotic.     CRISTIAN Miramontes  Family Medicine   Covering for Renita Guerra M.D.       ----- Message -----   From:Rob Clifton   Sent:3/16/2020 8:34 PM PDT   To:Renita Guerra M.D.   Subject:RE: Non-Urgent Medical Question    Dr. Guerra,  Yes, it is scabby with some redness, feels taut when I walk, and occasionally it weeps. I thought it had improved, but now it doesn't seem to want to go away! Maybe you should take another look at it? Let me know. Thanks again. Burton      ----- Message -----   From:Renita Guerra M.D.   Sent:3/16/2020 8:46 AM PDT    To:Rob Clifton   Subject:RE: Non-Urgent Medical Question    Hi Burton,    I'm sorry to hear it doesn't appear to be healing well. Could you tell me more of what you are seeing? Drainage, wound, increasing redness, pain?    -Renita Guerra M.D.      ----- Message -----   From:Rob Clifton   Sent:3/16/2020 6:58 AM PDT   To:Renita Guerra M.D.   Subject:Non-Urgent Medical Question    Dr. Guerra,  The wound on my left Achilles tendon is not healing properly. I think I should see a dermatologist or come in and see you again. If its the dermatologist, could you please give me a referral?  Thanks,  Burton Clifton

## 2020-03-18 NOTE — TELEPHONE ENCOUNTER
From: Rob Clifton  To: CAR Strauss  Sent: 3/17/2020 6:03 PM PDT  Subject: Non-Urgent Medical Question    Marcie Bryant is a picture of my left achilles tendon. I am concerned that this is an open wound now. Please advise my next action.  Thanks.  Burton      ----- Message -----   From:CAR Strauss   Sent:3/17/2020 8:01 AM PDT   To:Rob Clifton   Subject:RE: Non-Urgent Medical Question    Hi Burton,     Is there any redness, warmth or drainage? Are you able to send a picture? We may need to try an antibiotic.     JUAN C Miramontes-C  Family Medicine   Covering for Renita Guerra M.D.       ----- Message -----   From:Rob Clifton   Sent:3/16/2020 8:34 PM PDT   To:Renita Guerra M.D.   Subject:RE: Non-Urgent Medical Question    Dr. Guerra,  Yes, it is scabby with some redness, feels taut when I walk, and occasionally it weeps. I thought it had improved, but now it doesn't seem to want to go away! Maybe you should take another look at it? Let me know. Thanks again. Burton      ----- Message -----   From:Renita Guerra M.D.   Sent:3/16/2020 8:46 AM PDT   To:Rob Clifton   Subject:RE: Non-Urgent Medical Question    Hi Burton,    I'm sorry to hear it doesn't appear to be healing well. Could you tell me more of what you are seeing? Drainage, wound, increasing redness, pain?    -Renita Guerra M.D.      ----- Message -----   From:Rob Clifton   Sent:3/16/2020 6:58 AM PDT   To:Renita Guerra M.D.   Subject:Non-Urgent Medical Question    Dr. Guerra,  The wound on my left Achilles tendon is not healing properly. I think I should see a dermatologist or come in and see you again. If its the dermatologist, could you please give me a referral?  Thanks,  Burton Clifton

## 2020-03-19 ENCOUNTER — PATIENT MESSAGE (OUTPATIENT)
Dept: MEDICAL GROUP | Facility: PHYSICIAN GROUP | Age: 70
End: 2020-03-19

## 2020-03-19 DIAGNOSIS — S91.309A WOUND OF FOOT: ICD-10-CM

## 2020-03-19 DIAGNOSIS — T14.8XXA OPEN WOUND: ICD-10-CM

## 2020-03-19 RX ORDER — SULFAMETHOXAZOLE AND TRIMETHOPRIM 800; 160 MG/1; MG/1
1 TABLET ORAL 2 TIMES DAILY
Qty: 20 TAB | Refills: 0 | Status: SHIPPED | OUTPATIENT
Start: 2020-03-19 | End: 2020-03-29

## 2020-03-19 NOTE — TELEPHONE ENCOUNTER
From: Rob Clifton  To: CAR Strauss  Sent: 3/19/2020 11:12 AM PDT  Subject: Non-Urgent Medical Question    Annette   Please call me at  as soon as possible. The Rx you wrote was sent to [facility] and not to [facility] on [address] as I SPECIFICALLY requested. [facility] cannot home deliver this Rx and [facility] won't accept a Rx transfer for a first time prescription. I have already paid [facility] for the drugs. Please, please, please send the prescription to [facility] and advise me when it is done.   Thanks.      ----- Message -----   From:CAR Strauss   Sent:3/18/2020 2:51 PM PDT   To:Rob Clifton   Subject:RE: Non-Urgent Medical Question    I have sent this to [facility]. Let me know if you need anything.     CRISTIAN Miramontes  Family Medicine   Covering for Renita Guerra M.D.       ----- Message -----   From:Rob Clifton   Sent:3/18/2020 11:49 AM PDT   To:CAR Strauss   Subject:Non-Urgent Medical Question    Thanks Annette. Will you send the Rx to [facility] or do I need to pick it up at your office?  Looking forward to receiving the wound care referral.   Burton      ----- Message -----   From:CAR Strauss   Sent:3/18/2020 10:04 AM PDT   To:Rob Clifton   Subject:RE: Non-Urgent Medical Question    Hi Burton,     I was contacted by your physical therapist with concerns about the wound and with the picture I think we need to get wound care involved. I will send in an urgent referral. I am going to start you on an antibiotic in the mean time. Please let us know if you need anything.     CRISTIAN Miramontes  Family Medicine   Covering for Renita Guerra M.D.       ----- Message -----   From:Rob Clifton   Sent:3/17/2020 6:03 PM PDT   To:Annette C Resavy, A.P.R.N.   Subject:Non-Urgent Medical Question    Annette,  Attached is a picture of my left achilles tendon. I am concerned that this is an open wound now. Please advise my  next action.  Thanks.  Burton      ----- Message -----   From:CAR Strauss   Sent:3/17/2020 8:01 AM PDT   To:Rob Clifton   Subject:RE: Non-Urgent Medical Question    Hi Burton,     Is there any redness, warmth or drainage? Are you able to send a picture? We may need to try an antibiotic.     Annette Torres, CORBINP-C  Family Medicine   Covering for Renita Guerra M.D.       ----- Message -----   From:Rob Clifton   Sent:3/16/2020 8:34 PM PDT   To:Renita Guerra M.D.   Subject:RE: Non-Urgent Medical Question    Dr. Guerra,  Yes, it is scabby with some redness, feels taut when I walk, and occasionally it weeps. I thought it had improved, but now it doesn't seem to want to go away! Maybe you should take another look at it? Let me know. Thanks again. Burton      ----- Message -----   From:Renita Guerra M.D.   Sent:3/16/2020 8:46 AM PDT   To:Rob Clifton   Subject:RE: Non-Urgent Medical Question    Hi Burton,    I'm sorry to hear it doesn't appear to be healing well. Could you tell me more of what you are seeing? Drainage, wound, increasing redness, pain?    -Renita Guerra M.D.      ----- Message -----   From:Rob Clifton   Sent:3/16/2020 6:58 AM PDT   To:Renita Guerra M.D.   Subject:Non-Urgent Medical Question    Dr. Guerra,  The wound on my left Achilles tendon is not healing properly. I think I should see a dermatologist or come in and see you again. If its the dermatologist, could you please give me a referral?  Thanks,  Burton Clifton

## 2020-03-19 NOTE — TELEPHONE ENCOUNTER
From: Rob Clifton  To: CAR Strauss  Sent: 3/18/2020 5:03 PM PDT  Subject: Non-Urgent Medical Question    Annette   I am at [facility] here on [address] and they don't have the antibiotic Rx.  Did you send it to the correct [facility]?  Thanks.   Burton      ----- Message -----   From:CAR Strauss   Sent:3/18/2020 2:51 PM PDT   To:Rob Clifton   Subject:RE: Non-Urgent Medical Question    I have sent this to [facility]. Let me know if you need anything.     CRISTIAN Miramontes  Family Medicine   Covering for Renita Guerra M.D.       ----- Message -----   From:Rob Clifton   Sent:3/18/2020 11:49 AM PDT   To:CAR Strauss   Subject:Non-Urgent Medical Question    Thanks Annette. Will you send the Rx to [facility] or do I need to pick it up at your office?  Looking forward to receiving the wound care referral.   Burton      ----- Message -----   From:CAR Strauss   Sent:3/18/2020 10:04 AM PDT   To:Rob Clifton   Subject:RE: Non-Urgent Medical Question    Zacarias Manrique,     I was contacted by your physical therapist with concerns about the wound and with the picture I think we need to get wound care involved. I will send in an urgent referral. I am going to start you on an antibiotic in the mean time. Please let us know if you need anything.     CRISTIAN Miramontes  Family Medicine   Covering for Renita Guerra M.D.       ----- Message -----   From:Rob Clifton   Sent:3/17/2020 6:03 PM PDT   To:CAR Strauss   Subject:Non-Urgent Medical Question    Annette,  Attached is a picture of my left achilles tendon. I am concerned that this is an open wound now. Please advise my next action.  Thanks.  Burton      ----- Message -----   From:Annette Torres A.P.R.N.   Sent:3/17/2020 8:01 AM PDT   To:Rob Clifton   Subject:RE: Non-Urgent Medical Question    Hi Burton,     Is there any redness, warmth or drainage? Are you able to send a  picture? We may need to try an antibiotic.     Annette Torres, FNP-C  Family Medicine   Covering for Renita Guerra M.D.       ----- Message -----   From:Rob Clifton   Sent:3/16/2020 8:34 PM PDT   To:Renita Guerra M.D.   Subject:RE: Non-Urgent Medical Question    Dr. Guerra,  Yes, it is scabby with some redness, feels taut when I walk, and occasionally it weeps. I thought it had improved, but now it doesn't seem to want to go away! Maybe you should take another look at it? Let me know. Thanks again. Burton      ----- Message -----   From:Renita Guerra M.D.   Sent:3/16/2020 8:46 AM PDT   To:Rob Clifton   Subject:RE: Non-Urgent Medical Question    Hi Burton,    I'm sorry to hear it doesn't appear to be healing well. Could you tell me more of what you are seeing? Drainage, wound, increasing redness, pain?    -Renita Guerra M.D.      ----- Message -----   From:Rob Clifton   Sent:3/16/2020 6:58 AM PDT   To:Renita Guerra M.D.   Subject:Non-Urgent Medical Question    Dr. Guerra,  The wound on my left Achilles tendon is not healing properly. I think I should see a dermatologist or come in and see you again. If its the dermatologist, could you please give me a referral?  Thanks,  Burton Clifton

## 2020-03-23 ENCOUNTER — OFFICE VISIT (OUTPATIENT)
Dept: WOUND CARE | Facility: MEDICAL CENTER | Age: 70
End: 2020-03-23
Attending: NURSE PRACTITIONER
Payer: COMMERCIAL

## 2020-03-23 ENCOUNTER — HOSPITAL ENCOUNTER (OUTPATIENT)
Facility: MEDICAL CENTER | Age: 70
End: 2020-03-23
Attending: NURSE PRACTITIONER
Payer: COMMERCIAL

## 2020-03-23 DIAGNOSIS — T14.8XXA WOUND INFECTION: ICD-10-CM

## 2020-03-23 DIAGNOSIS — M67.972 ACHILLES TENDON DISORDER, LEFT: ICD-10-CM

## 2020-03-23 DIAGNOSIS — L08.9 WOUND INFECTION: ICD-10-CM

## 2020-03-23 LAB
GRAM STN SPEC: NORMAL
SIGNIFICANT IND 70042: NORMAL
SITE SITE: NORMAL
SOURCE SOURCE: NORMAL

## 2020-03-23 PROCEDURE — 87186 SC STD MICRODIL/AGAR DIL: CPT

## 2020-03-23 PROCEDURE — 87205 SMEAR GRAM STAIN: CPT

## 2020-03-23 PROCEDURE — 99214 OFFICE O/P EST MOD 30 MIN: CPT

## 2020-03-23 PROCEDURE — 87070 CULTURE OTHR SPECIMN AEROBIC: CPT

## 2020-03-23 PROCEDURE — 87077 CULTURE AEROBIC IDENTIFY: CPT

## 2020-03-23 PROCEDURE — 11042 DBRDMT SUBQ TIS 1ST 20SQCM/<: CPT

## 2020-03-23 RX ORDER — SODIUM HYPOCHLORITE 1.25 MG/ML
250 SOLUTION TOPICAL 2 TIMES DAILY
Qty: 1 BOTTLE | Refills: 0 | Status: SHIPPED
Start: 2020-03-23 | End: 2020-04-08

## 2020-03-23 RX ORDER — MELOXICAM 15 MG/1
TABLET ORAL
COMMUNITY
Start: 2020-03-09 | End: 2020-04-08

## 2020-03-23 ASSESSMENT — ENCOUNTER SYMPTOMS
CLAUDICATION: 0
FEVER: 0
CHILLS: 0
MYALGIAS: 1
WEIGHT LOSS: 0
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
## 2020-03-23 NOTE — PROCEDURES
CSWD with forceps & scalpel to remove non-viable detaching soft eschar cap from LLE wound bed after application of topical lidocaine. Then MD preformed further debridement.

## 2020-03-23 NOTE — PATIENT INSTRUCTIONS
"Should you experience any significant changes in your wound(s) such as infection (redness, swelling, localized heat, increased pain, fever >101 F, chills) or have any questions regarding your home care instructions, please contact the wound center [phone]. If after hours, contact your primary care physician or go the hospital emergency room.  Change dressing if over saturated, soiled or its falling off or twice daily (in morning and at night). Keep wound covered with dressing during the day and when you sleep. Apply tubigrip stocking to your leg ending 2 fingers below back of knee without wrinkles in the morning; you can take it off at night to sleep. You may shower without the dressing on, rinse out the wound last and let dry; then apply your dressing.  To change you dressing, wash your hands first:  1) Apply Desitin zinc barrier cream (found in the baby isle) around the wound.  2) Cut small strip of 2\" iraida gauze with sterile scissors (clean with boiling water or alcohol). Then get strip of gauze damp with Dakin's solution (you can  the prescription at your pharmacy; or make it yourself with recipe provided). Place into wound bed.  3) Cover site with a bandaid with four adhesive sides (you can buy at store) or cover with gauze and tape provided.  4) Change twice daily and wear compression stocking.      Get your custom boot at [facility] with your prescription.      "

## 2020-03-24 ENCOUNTER — APPOINTMENT (OUTPATIENT)
Dept: PHYSICAL THERAPY | Facility: REHABILITATION | Age: 70
End: 2020-03-24
Attending: FAMILY MEDICINE
Payer: COMMERCIAL

## 2020-03-25 ENCOUNTER — HOSPITAL ENCOUNTER (OUTPATIENT)
Dept: LAB | Facility: MEDICAL CENTER | Age: 70
End: 2020-03-25
Attending: ORTHOPAEDIC SURGERY
Payer: COMMERCIAL

## 2020-03-25 LAB
BACTERIA WND AEROBE CULT: ABNORMAL
BACTERIA WND AEROBE CULT: ABNORMAL
BUN SERPL-MCNC: 23 MG/DL (ref 8–22)
CREAT SERPL-MCNC: 1.2 MG/DL (ref 0.5–1.4)
GRAM STN SPEC: ABNORMAL
SIGNIFICANT IND 70042: ABNORMAL
SITE SITE: ABNORMAL
SOURCE SOURCE: ABNORMAL

## 2020-03-25 PROCEDURE — 36415 COLL VENOUS BLD VENIPUNCTURE: CPT

## 2020-03-25 PROCEDURE — 82565 ASSAY OF CREATININE: CPT

## 2020-03-25 PROCEDURE — 84520 ASSAY OF UREA NITROGEN: CPT

## 2020-03-26 ENCOUNTER — APPOINTMENT (OUTPATIENT)
Dept: PHYSICAL THERAPY | Facility: REHABILITATION | Age: 70
End: 2020-03-26
Attending: FAMILY MEDICINE
Payer: COMMERCIAL

## 2020-03-27 ENCOUNTER — OFFICE VISIT (OUTPATIENT)
Dept: WOUND CARE | Facility: MEDICAL CENTER | Age: 70
End: 2020-03-27
Attending: NURSE PRACTITIONER
Payer: COMMERCIAL

## 2020-03-27 DIAGNOSIS — S81.802A OPEN WOUND OF LEFT LOWER LEG, INITIAL ENCOUNTER: ICD-10-CM

## 2020-03-27 PROCEDURE — 99213 OFFICE O/P EST LOW 20 MIN: CPT

## 2020-03-27 PROCEDURE — 99212 OFFICE O/P EST SF 10 MIN: CPT | Performed by: NURSE PRACTITIONER

## 2020-03-27 RX ORDER — SULFAMETHOXAZOLE AND TRIMETHOPRIM 800; 160 MG/1; MG/1
1 TABLET ORAL 2 TIMES DAILY
Qty: 20 TAB | Refills: 0 | Status: SHIPPED | OUTPATIENT
Start: 2020-03-27 | End: 2020-04-06

## 2020-03-27 NOTE — PROGRESS NOTES
Pt seen during ortho rounds with LPS team for Left achilles wounds.  Measurements(cm): 2.3x2.0x1.0cm. Following physician assessment, RN evaluated patient's wound, dakin's soaked gauze covered with adhesive foam. Tubigrib size E.
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
## 2020-03-31 ENCOUNTER — APPOINTMENT (OUTPATIENT)
Dept: PHYSICAL THERAPY | Facility: REHABILITATION | Age: 70
End: 2020-03-31
Attending: FAMILY MEDICINE
Payer: COMMERCIAL

## 2020-03-31 ENCOUNTER — OFFICE VISIT (OUTPATIENT)
Dept: WOUND CARE | Facility: MEDICAL CENTER | Age: 70
End: 2020-03-31
Attending: NURSE PRACTITIONER
Payer: COMMERCIAL

## 2020-03-31 VITALS
TEMPERATURE: 97.7 F | RESPIRATION RATE: 18 BRPM | HEART RATE: 75 BPM | SYSTOLIC BLOOD PRESSURE: 126 MMHG | DIASTOLIC BLOOD PRESSURE: 67 MMHG | OXYGEN SATURATION: 96 %

## 2020-03-31 DIAGNOSIS — L08.9 WOUND INFECTION: ICD-10-CM

## 2020-03-31 DIAGNOSIS — M67.972 ACHILLES TENDON DISORDER, LEFT: ICD-10-CM

## 2020-03-31 DIAGNOSIS — T14.8XXA WOUND INFECTION: ICD-10-CM

## 2020-03-31 PROCEDURE — 11042 DBRDMT SUBQ TIS 1ST 20SQCM/<: CPT | Performed by: NURSE PRACTITIONER

## 2020-03-31 PROCEDURE — 11042 DBRDMT SUBQ TIS 1ST 20SQCM/<: CPT

## 2020-03-31 ASSESSMENT — ENCOUNTER SYMPTOMS
WEIGHT LOSS: 0
CHILLS: 0
CLAUDICATION: 0
FEVER: 0
MYALGIAS: 1

## 2020-03-31 NOTE — PATIENT INSTRUCTIONS
-Keep dressings clean and dry. Change your dressings twice daily.    -Avoid prolonged standing or sitting without elevating your legs.    -Wear your offloading boot any time you are up walking.    -Should you experience any significant changes in your wound(s), such as infection (redness, swelling, localized heat, increased pain, fever > 101 F, chills) or have any questions regarding your home care instructions, please contact the wound center at [phone]. If after hours, contact your primary care physician or go to the hospital emergency room.

## 2020-03-31 NOTE — PROGRESS NOTES
Provider Encounter- Full Thickness wound    HISTORY OF PRESENT ILLNESS  Wound History:    START OF CARE IN CLINIC: 3/23/2020    REFERRING PROVIDER: Renita Guerra MD     WOUND- Full Thickness Wound   LOCATION: Left Achilles tendon.   HISTORY:  Mr. Clifton is an otherwise healthy man who presents with a wound that he believes appeared spontaneously.  He has a history of several orthopedic surgeries, over 10-20 years ago and has tibial sarah as well as a healed fibula fracture.  He has had no problems with these injuries recently, but noticed a new concerning area in late January early February of this year, that became a full thickness wound.      Burton denies any symptoms of claudication , but has had some recent problems with sciatica.     Pertinent Medical History: none     TOBACCO USE: none    Patient's problem list, allergies, and current medications reviewed and updated in Epic    Interval History:  3/23/2020:  Initial clinic visit with Dr. Pena.  Patient seen off my regular schedule due to appropriate concerns regarding the full thickness wound.  Denies any fever or chills.  Admits to increased pain over the achilles tendon.     3/31/2020 : Clinic visit with ARIANA Beltrán.  Patient was seen by LPS team in clinic on 3/27, MRI and surgery recommended.  However, patient's insurance has thus far refused to authorize MRI, and surgery cannot be scheduled.  Per patient, Dr. Huynh is supposed to write a letter of appeal to patient's insurance.  In the meantime, patient's wife has been changing his dressing 2 times per day with quarter strength Dakin's moistened gauze, and he has been wearing his offloading boot consistently.     REVIEW OF SYSTEMS:   Review of Systems   Constitutional: Negative for chills, fever, malaise/fatigue and weight loss.   Cardiovascular: Negative for claudication and leg swelling.   Musculoskeletal: Positive for myalgias. Negative for joint pain.   Skin:        Open wound over the  distal Achilles   All other systems reviewed and are negative.      PHYSICAL EXAMINATION:       Physical Exam   Constitutional: He is well-developed, well-nourished, and in no distress.   HENT:   Head: Normocephalic and atraumatic.   Eyes: Pupils are equal, round, and reactive to light. Conjunctivae are normal.   Neck: Normal range of motion. Neck supple. No tracheal deviation present.   Cardiovascular: Normal rate and regular rhythm.   Right PT pulse intact.  Wound and patient positioning affected my ability to examine pulses.   Pulmonary/Chest: Effort normal. No respiratory distress.   Abdominal: Soft. He exhibits no distension.   Musculoskeletal:         General: No edema.   Skin:   Open wound with loose slough   Psychiatric: Mood, memory, affect and judgment normal.       WOUND ASSESSMENT  Wound 03/23/20 Full Thickness Wound Left Posterior LLE achilles (Active)   Wound Image    3/31/2020  8:35 AM   Site Assessment Red;Yellow 3/31/2020  8:35 AM   Periwound Assessment Blanchable erythema;Edema 3/31/2020  8:35 AM   Margins Unattached edges 3/31/2020  8:35 AM   Closure Secondary intention 3/23/2020 11:00 AM   Drainage Amount Small 3/31/2020  8:35 AM   Drainage Description Serosanguineous 3/31/2020  8:35 AM   Treatments Cleansed;Topical Lidocaine;Provider debridement 3/31/2020  8:35 AM   Wound Cleansing Puracyn [place] 3/31/2020  8:35 AM   Periwound Protectant Barrier Paste;Skin Protectant Wipes to Periwound 3/31/2020  8:35 AM   Dressing Cleansing/Solutions Other (Comments) 3/31/2020  8:35 AM   Dressing Options Puracyn Gel;Dry Roll Gauze;Silicone Adhesive Foam;Tubigrip 3/31/2020  8:35 AM   Dressing Changed Changed 3/31/2020  8:35 AM   Dressing Status Clean;Dry;Intact 3/23/2020 11:00 AM   Dressing Change/Treatment Frequency Other (Comments) 3/23/2020 11:00 AM   NEXT Dressing Change/Treatment Date 03/23/20 3/23/2020 11:00 AM   Non-staged Wound Description Full thickness 3/31/2020  8:35 AM   Wound Length (cm) 2.2 cm  3/31/2020  8:35 AM   Wound Width (cm) 1.6 cm 3/31/2020  8:35 AM   Wound Depth (cm) 0.4 cm 3/31/2020  8:35 AM   Wound Surface Area (cm^2) 3.52 cm^2 3/31/2020  8:35 AM   Wound Volume (cm^3) 1.41 cm^3 3/31/2020  8:35 AM   Post-Procedure Length (cm) 2.2 cm 3/31/2020  8:35 AM   Post-Procedure Width (cm) 2 cm 3/31/2020  8:35 AM   Post-Procedure Depth (cm) 0.6 cm 3/31/2020  8:35 AM   Post-Procedure Surface Area (cm^2) 4.4 cm^2 3/31/2020  8:35 AM   Post-Procedure Volume (cm^3) 2.64 cm^3 3/31/2020  8:35 AM   Wound Healing % 53 3/31/2020  8:35 AM   Wound Bed Granulation (%) 15 % 3/31/2020  8:35 AM   Wound Bed Epithelium (%) 0 % 3/31/2020  8:35 AM   Wound Bed Slough (%) 85 % 3/31/2020  8:35 AM   Wound Bed Eschar (%) 0 % 3/31/2020  8:35 AM   Wound Bed Slough % - (Post-Procedure) 40 % 3/23/2020 11:00 AM   Tunneling (cm) 0 cm 3/23/2020 11:00 AM   Undermining (cm) 0.8 cm 3/31/2020  8:35 AM   Undermining of Wound, 1st Location From 12 o'clock;To 3 o'clock 3/31/2020  8:35 AM   Shape oval 3/23/2020 11:00 AM   Wound Odor Mild;Foul 3/31/2020  8:35 AM   Pulses 2+;Left;DP;PT;Doppler 3/23/2020 11:00 AM   Exposed Structures AMY 3/31/2020  8:35 AM             PROCEDURE:   -2% viscous lidocaine applied topically to wound bed for approximately 5 minutes prior to debridement  -Curette used to debride wound bed.  Excisional debridement was performed to remove devitalized tissue until healthy, bleeding tissue was visualized.   Entire surface of wound, 4.4 cm2 debrided.  Tissue debrided into the subcutaneous and tendon layer.    -Bleeding controlled with manual pressure.    -Wound care completed by wound RN, refer to flowsheet  -Patient tolerated the procedure well, without c/o pain or discomfort.       Pertinent Labs and Diagnostics    IMAGIN/15/18  FINDINGS:  Intramedullary sarah traverses a healed distal tibial diaphyseal fracture. There is an angulated healed distal fibular diaphyseal fracture. No acute fracture or dislocation is seen.  Bones are demineralized. There is mild soft tissue swelling. There is   spurring of the calcaneus at the insertion of the Achilles tendon. Atherosclerotic calcification is seen.     IMPRESSION:     Postsurgical and old posttraumatic changes of the distal tibia.     Healed angulated fracture of the distal fibula diaphysis.     Mild soft tissue swelling.     Demineralization.     Arterial calcification    VASCULAR STUDIES: pending    LAST  WOUND CULTURE:   Date: 3/23/2020   results: Moderate growth MSSA      ASSESSMENT AND PLAN:     1. Achilles tendon disorder, left  Comments: Spontaneous opening of the wound over old wound site.  Patient has history of Achilles rupture and repair, along with open wound from 34 years ago.    3/31/20: Less nonviable tissue noted in wound bed, however odor persists.  His wife is been applying Dakin's moistened gauze 2 times daily.  He has not yet heard from [employer] regarding appeal for MRI.  -Excisional debridement of wound in clinic today, medically necessary to promote wound healing.  -Patient to return to clinic weekly for assessment and debridement  -DC Dakin's, start Puracyn gel  -Patient's wife to change dressing daily  -We will contact Dr. Huynh to see if appeal has been sent yet    Wound care: Puracyn moistened gauze to wound bed daily to manage bioburden and to maintain moisture, foam cover dressing, Hypafix tape      2. Wound infection    3/23/2020: Wound culture results from 3/23+ for MSSA.  Patient was prescribed 10-day course of Bactrim DS.  Patient was seen by LPS team on 3/27, Bactrim was extended an additional 10 days.  Wound odor persists, though fairly mild today  -Patient to complete Bactrim as prescribed  -Stop Dakin's, start Puracyn  -Monitor for signs and symptoms of infection at each clinic visit        PATIENT EDUCATION  - Importance of adequate nutrition for wound healing  -Advised to go to ER for any increased redness, swelling, drainage, or odor, or if patient  develops fever, chills, nausea or vomiting.            Please note that this note may have been created using voice recognition software. I have worked with technical experts from [facility] to optimize the interface.  I have made every reasonable attempt to correct obvious errors, but there may be errors of grammar and possibly content that I did not discover before finalizing the note.

## 2020-04-01 ENCOUNTER — HOSPITAL ENCOUNTER (OUTPATIENT)
Dept: RADIOLOGY | Facility: MEDICAL CENTER | Age: 70
End: 2020-04-01
Attending: ORTHOPAEDIC SURGERY
Payer: COMMERCIAL

## 2020-04-01 DIAGNOSIS — M25.572 ACUTE LEFT ANKLE PAIN: ICD-10-CM

## 2020-04-01 PROCEDURE — 700117 HCHG RX CONTRAST REV CODE 255: Performed by: ORTHOPAEDIC SURGERY

## 2020-04-01 PROCEDURE — A9576 INJ PROHANCE MULTIPACK: HCPCS | Performed by: ORTHOPAEDIC SURGERY

## 2020-04-01 PROCEDURE — 73723 MRI JOINT LWR EXTR W/O&W/DYE: CPT | Mod: LT

## 2020-04-01 RX ADMIN — GADOTERIDOL 20 ML: 279.3 INJECTION, SOLUTION INTRAVENOUS at 16:07

## 2020-04-06 ENCOUNTER — OFFICE VISIT (OUTPATIENT)
Dept: WOUND CARE | Facility: MEDICAL CENTER | Age: 70
End: 2020-04-06
Attending: NURSE PRACTITIONER
Payer: COMMERCIAL

## 2020-04-06 VITALS
DIASTOLIC BLOOD PRESSURE: 79 MMHG | TEMPERATURE: 97.9 F | HEART RATE: 68 BPM | SYSTOLIC BLOOD PRESSURE: 123 MMHG | OXYGEN SATURATION: 98 %

## 2020-04-06 DIAGNOSIS — T14.8XXA WOUND INFECTION: ICD-10-CM

## 2020-04-06 DIAGNOSIS — M67.972 ACHILLES TENDON DISORDER, LEFT: Primary | ICD-10-CM

## 2020-04-06 DIAGNOSIS — L08.9 WOUND INFECTION: ICD-10-CM

## 2020-04-06 PROCEDURE — 11042 DBRDMT SUBQ TIS 1ST 20SQCM/<: CPT

## 2020-04-06 PROCEDURE — 11043 DBRDMT MUSC&/FSCA 1ST 20/<: CPT

## 2020-04-06 PROCEDURE — 11042 DBRDMT SUBQ TIS 1ST 20SQCM/<: CPT | Performed by: NURSE PRACTITIONER

## 2020-04-06 ASSESSMENT — ENCOUNTER SYMPTOMS
CHILLS: 0
WEIGHT LOSS: 0
CLAUDICATION: 0
MYALGIAS: 1
FEVER: 0

## 2020-04-06 ASSESSMENT — PAIN SCALES - GENERAL: PAINLEVEL: NO PAIN

## 2020-04-06 NOTE — PROGRESS NOTES
Provider Encounter- Full Thickness wound    HISTORY OF PRESENT ILLNESS  Wound History:    START OF CARE IN CLINIC: 3/23/2020    REFERRING PROVIDER: Renita Guerra MD     WOUND- Full Thickness Wound   LOCATION: Left Achilles tendon.   HISTORY:  Mr. Clifton is an otherwise healthy man who presents with a wound that he believes appeared spontaneously.  He has a history of several orthopedic surgeries, over 10-20 years ago and has tibial sarah as well as a healed fibula fracture.  He has had no problems with these injuries recently, but noticed a new concerning area in late January early February of this year, that became a full thickness wound.      Burton denies any symptoms of claudication , but has had some recent problems with sciatica.     Pertinent Medical History: none     TOBACCO USE: none    Patient's problem list, allergies, and current medications reviewed and updated in Epic    Interval History:  3/23/2020:  Initial clinic visit with Dr. Pena.  Patient seen off my regular schedule due to appropriate concerns regarding the full thickness wound.  Denies any fever or chills.  Admits to increased pain over the achilles tendon.     3/31/2020 : Clinic visit with ARIANA Beltrán.  Patient was seen by LPS team in clinic on 3/27, MRI and surgery recommended.  However, patient's insurance has thus far refused to authorize MRI, and surgery cannot be scheduled.  Per patient, Dr. Huynh is supposed to write a letter of appeal to patient's insurance.  In the meantime, patient's wife has been changing his dressing 2 times per day with quarter strength Dakin's moistened gauze, and he has been wearing his offloading boot consistently.     4/6/2020: Clinic visit with ARIANA Lucas. Patient states that they are feeling well today.  Patient denies fever, chills, nausea, vomiting, lightheadedness, dizziness, shortness of breath and chest pain.  Patient is now on Puracyn gel now instead of Dakin's.  Removed some  slough and nonviable tissue however, I believe the tendon sheath is exposed at this time.  Patient does have an appointment with Dr. Huynh later today 4/6/2020 at 1:15 PM.  We will await Dr. Huynh's expert opinion regarding possible surgical intervention.    REVIEW OF SYSTEMS:   Review of Systems   Constitutional: Negative for chills, fever, malaise/fatigue and weight loss.   Cardiovascular: Negative for claudication and leg swelling.   Musculoskeletal: Positive for myalgias. Negative for joint pain.   Skin:        Open wound over the distal Achilles   All other systems reviewed and are negative.      PHYSICAL EXAMINATION:       Physical Exam   Constitutional: He is well-developed, well-nourished, and in no distress.   HENT:   Head: Normocephalic and atraumatic.   Eyes: Pupils are equal, round, and reactive to light. Conjunctivae are normal.   Neck: Normal range of motion. Neck supple. No tracheal deviation present.   Cardiovascular: Normal rate and regular rhythm.   Right PT pulse intact.     Pulmonary/Chest: Effort normal. No respiratory distress.   Abdominal: Soft. He exhibits no distension.   Musculoskeletal:         General: No edema.   Skin:   Open wound wound to Achilles with tendon sheath exposed will keep moist with Puracyn gel.   Psychiatric: Mood, memory, affect and judgment normal.       WOUND ASSESSMENT            PROCEDURE:   -2% viscous lidocaine applied topically to wound bed for approximately 5 minutes prior to debridement  -Curette used to debride wound bed.  Excisional debridement was performed to remove devitalized tissue until healthy, bleeding tissue was visualized.   Entire surface of wound, 4.4 cm2 debrided.  Tissue debrided into the subcutaneous and tendon layer.    -Bleeding controlled with manual pressure.    -Wound care completed by wound RN, refer to flowsheet  -Patient tolerated the procedure well, without c/o pain or discomfort.       Pertinent Labs and Diagnostics    IMAGING:  6/15/18  FINDINGS:  Intramedullary sarah traverses a healed distal tibial diaphyseal fracture. There is an angulated healed distal fibular diaphyseal fracture. No acute fracture or dislocation is seen. Bones are demineralized. There is mild soft tissue swelling. There is   spurring of the calcaneus at the insertion of the Achilles tendon. Atherosclerotic calcification is seen.     IMPRESSION:     Postsurgical and old posttraumatic changes of the distal tibia.     Healed angulated fracture of the distal fibula diaphysis.     Mild soft tissue swelling.     Demineralization.     Arterial calcification    VASCULAR STUDIES: pending    LAST  WOUND CULTURE:   Date: 3/23/2020   results: Moderate growth MSSA      ASSESSMENT AND PLAN:     1. Achilles tendon disorder, left  Comments: Spontaneous opening of the wound over old wound site.  Patient has history of Achilles rupture and repair, along with open wound from 34 years ago.    04/06/20: MRI has been performed see results review for full explanation.  Appears to be significant inflammation related to the Achilles tendon.  Patient to review MRI with Dr. Huynh later this afternoon  -Excisional debridement of wound in clinic today, medically necessary to promote wound healing.  -Patient to return to clinic weekly for assessment and debridement  - Puracyn gel continue to keep the tendon sheath moisturized.  -Patient's wife to change dressing every 2 days unless saturated.      Wound care: Puracyn moistened gauze to wound bed daily to manage bioburden and to maintain moisture, roll gauze, foam cover dressing, Hypafix tape      2. Wound infection    04/06/20: Wound culture results from 3/23+ for MSSA.  Patient was prescribed 10-day course of Bactrim DS.  Patient was seen by LPS team on 3/27, Bactrim was extended an additional 10 days.  Wound odor persists, though fairly mild today  -Patient to complete Bactrim as prescribed  -Continue with Puracyn gel  -Monitor for signs and  symptoms of infection at each clinic visit        PATIENT EDUCATION  - Importance of adequate nutrition for wound healing  -Advised to go to ER for any increased redness, swelling, drainage, or odor, or if patient develops fever, chills, nausea or vomiting.            Please note that this note may have been created using voice recognition software. I have worked with technical experts from [facility] to optimize the interface.  I have made every reasonable attempt to correct obvious errors, but there may be errors of grammar and possibly content that I did not discover before finalizing the note.

## 2020-04-06 NOTE — PATIENT INSTRUCTIONS
Avoid prolonged standing or sitting without elevating your legs.  - Apply tubigrip to your legs ending 2 fingers below back of knee without wrinkles.        Should you experience any significant changes in your wound(s), such as infection (redness, swelling, localized heat, increased pain, fever > 101 F, chills) or have any questions regarding your home care instructions, please contact the wound center at [phone]. If after hours, contact your primary care physician or go to the hospital emergency room.   Keep dressing clean, dry and covered while bathing. Only change dressing if it becomes over saturated, soiled or falls off.

## 2020-04-07 ENCOUNTER — TELEPHONE (OUTPATIENT)
Dept: WOUND CARE | Facility: MEDICAL CENTER | Age: 70
End: 2020-04-07

## 2020-04-07 NOTE — TELEPHONE ENCOUNTER
Spoke with Dr. Huynh regarding planned surgery for Thursday 4/9.  Dr. Huynh intends to debride wound and apply VAC.  Order for VAC placed, will be delivered to patient's residence on 4/8.  I called patient, informed him of the above, advised him to bring the pump (within black case), 1 dressing kit, and 1 canister with him to his surgery on Thursday.

## 2020-04-08 ENCOUNTER — APPOINTMENT (OUTPATIENT)
Dept: ADMISSIONS | Facility: MEDICAL CENTER | Age: 70
End: 2020-04-08
Payer: COMMERCIAL

## 2020-04-08 RX ORDER — NAPROXEN 250 MG/1
500 TABLET ORAL DAILY
Status: ON HOLD | COMMUNITY
End: 2020-04-09

## 2020-04-08 NOTE — DISCHARGE PLANNING
Received a vm from Sailaja at [facility] that wound vac has been ordered and will be delivered to patients home and that pt knows to bring to the hospital tomorrow.    Received a vm from Annette AVENDANO at wound care clinic that she has received wound care referral and a wound eval is scheduled for 4/13 for 1030 am.

## 2020-04-08 NOTE — PROGRESS NOTES
COVID-19 Pre-surgery screenin. Do you have an undiagnosed respiratory illness or symptoms such as coughing or sneezing? NO  a. Onset of Sx N/A  b. Acute vs. chronic respiratory illness N/A     2. Do you have an unexplained fever greater than 100.4 degrees Fahrenheit or 38 degrees Celsius? NO     3. Have you had direct exposure to a patient who tested positive for Covid-19? NO        Have you traveled within the last 14 days to [place], [place], [place], [place], or [place]? NO                             Understands only one visitor and must answer no to above questions   nontender/soft/bowel sounds normal

## 2020-04-08 NOTE — DISCHARGE PLANNING
Left vm for Sailaja with [facility] for follow up. Left vm for Annette AVENDANO at [facility] Ext [phone] if pt is scheduled for dressing changes.

## 2020-04-08 NOTE — DISCHARGE PLANNING
Burton will have an Achilles I&D with Wound Vac placement. Spoke with Sailaja with [facility], she is not aware of this patient and will call the office to see if they have submitted paperwork for the wound vac. There is a referral in [facility] from 4/7 for [facility].

## 2020-04-09 ENCOUNTER — HOSPITAL ENCOUNTER (OUTPATIENT)
Facility: MEDICAL CENTER | Age: 70
End: 2020-04-09
Attending: ORTHOPAEDIC SURGERY | Admitting: ORTHOPAEDIC SURGERY
Payer: COMMERCIAL

## 2020-04-09 ENCOUNTER — ANESTHESIA EVENT (OUTPATIENT)
Dept: SURGERY | Facility: MEDICAL CENTER | Age: 70
End: 2020-04-09
Payer: COMMERCIAL

## 2020-04-09 ENCOUNTER — ANESTHESIA (OUTPATIENT)
Dept: SURGERY | Facility: MEDICAL CENTER | Age: 70
End: 2020-04-09
Payer: COMMERCIAL

## 2020-04-09 VITALS
HEIGHT: 71 IN | DIASTOLIC BLOOD PRESSURE: 83 MMHG | TEMPERATURE: 97.9 F | OXYGEN SATURATION: 95 % | WEIGHT: 197.53 LBS | HEART RATE: 80 BPM | BODY MASS INDEX: 27.65 KG/M2 | RESPIRATION RATE: 16 BRPM | SYSTOLIC BLOOD PRESSURE: 133 MMHG

## 2020-04-09 LAB
GRAM STN SPEC: NORMAL
SIGNIFICANT IND 70042: NORMAL
SITE SITE: NORMAL
SOURCE SOURCE: NORMAL

## 2020-04-09 PROCEDURE — 160027 HCHG SURGERY MINUTES - 1ST 30 MINS LEVEL 2: Performed by: ORTHOPAEDIC SURGERY

## 2020-04-09 PROCEDURE — 87205 SMEAR GRAM STAIN: CPT

## 2020-04-09 PROCEDURE — 160002 HCHG RECOVERY MINUTES (STAT): Performed by: ORTHOPAEDIC SURGERY

## 2020-04-09 PROCEDURE — 700102 HCHG RX REV CODE 250 W/ 637 OVERRIDE(OP): Performed by: ANESTHESIOLOGY

## 2020-04-09 PROCEDURE — A6454 SELF-ADHER BAND W>=3" <5"/YD: HCPCS | Performed by: ORTHOPAEDIC SURGERY

## 2020-04-09 PROCEDURE — 501838 HCHG SUTURE GENERAL: Performed by: ORTHOPAEDIC SURGERY

## 2020-04-09 PROCEDURE — 700105 HCHG RX REV CODE 258: Performed by: ORTHOPAEDIC SURGERY

## 2020-04-09 PROCEDURE — 87015 SPECIMEN INFECT AGNT CONCNTJ: CPT

## 2020-04-09 PROCEDURE — 501330 HCHG SET, CYSTO IRRIG TUBING: Performed by: ORTHOPAEDIC SURGERY

## 2020-04-09 PROCEDURE — 160035 HCHG PACU - 1ST 60 MINS PHASE I: Performed by: ORTHOPAEDIC SURGERY

## 2020-04-09 PROCEDURE — 160046 HCHG PACU - 1ST 60 MINS PHASE II: Performed by: ORTHOPAEDIC SURGERY

## 2020-04-09 PROCEDURE — 700111 HCHG RX REV CODE 636 W/ 250 OVERRIDE (IP): Performed by: ANESTHESIOLOGY

## 2020-04-09 PROCEDURE — 160036 HCHG PACU - EA ADDL 30 MINS PHASE I: Performed by: ORTHOPAEDIC SURGERY

## 2020-04-09 PROCEDURE — 87070 CULTURE OTHR SPECIMN AEROBIC: CPT

## 2020-04-09 PROCEDURE — 160038 HCHG SURGERY MINUTES - EA ADDL 1 MIN LEVEL 2: Performed by: ORTHOPAEDIC SURGERY

## 2020-04-09 PROCEDURE — 160048 HCHG OR STATISTICAL LEVEL 1-5: Performed by: ORTHOPAEDIC SURGERY

## 2020-04-09 PROCEDURE — 502000 HCHG MISC OR IMPLANTS RC 0278: Performed by: ORTHOPAEDIC SURGERY

## 2020-04-09 PROCEDURE — 700101 HCHG RX REV CODE 250: Performed by: ANESTHESIOLOGY

## 2020-04-09 PROCEDURE — 87076 CULTURE ANAEROBE IDENT EACH: CPT

## 2020-04-09 PROCEDURE — 160025 RECOVERY II MINUTES (STATS): Performed by: ORTHOPAEDIC SURGERY

## 2020-04-09 PROCEDURE — 160009 HCHG ANES TIME/MIN: Performed by: ORTHOPAEDIC SURGERY

## 2020-04-09 PROCEDURE — 87075 CULTR BACTERIA EXCEPT BLOOD: CPT

## 2020-04-09 PROCEDURE — 87077 CULTURE AEROBIC IDENTIFY: CPT | Mod: 91

## 2020-04-09 PROCEDURE — A6550 NEG PRES WOUND THER DRSG SET: HCPCS | Performed by: ORTHOPAEDIC SURGERY

## 2020-04-09 PROCEDURE — 87102 FUNGUS ISOLATION CULTURE: CPT

## 2020-04-09 PROCEDURE — A9270 NON-COVERED ITEM OR SERVICE: HCPCS | Performed by: ANESTHESIOLOGY

## 2020-04-09 PROCEDURE — 500881 HCHG PACK, EXTREMITY: Performed by: ORTHOPAEDIC SURGERY

## 2020-04-09 PROCEDURE — 700101 HCHG RX REV CODE 250: Performed by: ORTHOPAEDIC SURGERY

## 2020-04-09 DEVICE — MATRISTEM WOUND MATRIX 5CM X 5CM 2 LAYER: Type: IMPLANTABLE DEVICE | Status: FUNCTIONAL

## 2020-04-09 RX ORDER — CEFAZOLIN SODIUM 1 G/3ML
INJECTION, POWDER, FOR SOLUTION INTRAMUSCULAR; INTRAVENOUS PRN
Status: DISCONTINUED | OUTPATIENT
Start: 2020-04-09 | End: 2020-04-09 | Stop reason: SURG

## 2020-04-09 RX ORDER — SODIUM CHLORIDE, SODIUM LACTATE, POTASSIUM CHLORIDE, CALCIUM CHLORIDE 600; 310; 30; 20 MG/100ML; MG/100ML; MG/100ML; MG/100ML
INJECTION, SOLUTION INTRAVENOUS CONTINUOUS
Status: DISCONTINUED | OUTPATIENT
Start: 2020-04-09 | End: 2020-04-09 | Stop reason: HOSPADM

## 2020-04-09 RX ORDER — ASCORBIC ACID 500 MG
1000 TABLET ORAL ONCE
COMMUNITY
End: 2020-04-16

## 2020-04-09 RX ORDER — SULFAMETHOXAZOLE AND TRIMETHOPRIM 800; 160 MG/1; MG/1
1 TABLET ORAL 2 TIMES DAILY
Status: ON HOLD | COMMUNITY
End: 2020-04-17

## 2020-04-09 RX ORDER — HALOPERIDOL 5 MG/ML
1 INJECTION INTRAMUSCULAR
Status: DISCONTINUED | OUTPATIENT
Start: 2020-04-09 | End: 2020-04-09 | Stop reason: HOSPADM

## 2020-04-09 RX ORDER — COVID-19 ANTIGEN TEST
2 KIT MISCELLANEOUS DAILY
COMMUNITY
End: 2021-12-15

## 2020-04-09 RX ORDER — ONDANSETRON 2 MG/ML
4 INJECTION INTRAMUSCULAR; INTRAVENOUS
Status: COMPLETED | OUTPATIENT
Start: 2020-04-09 | End: 2020-04-09

## 2020-04-09 RX ADMIN — LIDOCAINE HYDROCHLORIDE 100 MG: 20 INJECTION, SOLUTION INTRAVENOUS at 11:23

## 2020-04-09 RX ADMIN — CEFAZOLIN 2 G: 330 INJECTION, POWDER, FOR SOLUTION INTRAMUSCULAR; INTRAVENOUS at 11:23

## 2020-04-09 RX ADMIN — ROCURONIUM BROMIDE 50 MG: 10 INJECTION, SOLUTION INTRAVENOUS at 11:23

## 2020-04-09 RX ADMIN — PROPOFOL 300 MG: 10 INJECTION, EMULSION INTRAVENOUS at 11:23

## 2020-04-09 RX ADMIN — FENTANYL CITRATE 50 MCG: 50 INJECTION, SOLUTION INTRAMUSCULAR; INTRAVENOUS at 13:22

## 2020-04-09 RX ADMIN — FENTANYL CITRATE 50 MCG: 50 INJECTION, SOLUTION INTRAMUSCULAR; INTRAVENOUS at 12:56

## 2020-04-09 RX ADMIN — LIDOCAINE HYDROCHLORIDE 0.5 ML: 10 INJECTION, SOLUTION EPIDURAL; INFILTRATION; INTRACAUDAL; PERINEURAL at 10:28

## 2020-04-09 RX ADMIN — SODIUM CHLORIDE, POTASSIUM CHLORIDE, SODIUM LACTATE AND CALCIUM CHLORIDE: 600; 310; 30; 20 INJECTION, SOLUTION INTRAVENOUS at 10:29

## 2020-04-09 RX ADMIN — EPHEDRINE SULFATE 10 MG: 50 INJECTION INTRAMUSCULAR; INTRAVENOUS; SUBCUTANEOUS at 11:57

## 2020-04-09 RX ADMIN — ONDANSETRON 4 MG: 2 INJECTION INTRAMUSCULAR; INTRAVENOUS at 12:42

## 2020-04-09 RX ADMIN — HYDROCODONE BITARTRATE AND ACETAMINOPHEN 15 ML: 7.5; 325 SOLUTION ORAL at 12:37

## 2020-04-09 RX ADMIN — FENTANYL CITRATE 50 MCG: 50 INJECTION, SOLUTION INTRAMUSCULAR; INTRAVENOUS at 12:43

## 2020-04-09 SDOH — HEALTH STABILITY: MENTAL HEALTH: HOW OFTEN DO YOU HAVE A DRINK CONTAINING ALCOHOL?: 4 OR MORE TIMES A WEEK

## 2020-04-09 SDOH — HEALTH STABILITY: MENTAL HEALTH: HOW OFTEN DO YOU HAVE 6 OR MORE DRINKS ON ONE OCCASION?: NEVER

## 2020-04-09 SDOH — HEALTH STABILITY: MENTAL HEALTH: HOW MANY STANDARD DRINKS CONTAINING ALCOHOL DO YOU HAVE ON A TYPICAL DAY?: 1 OR 2

## 2020-04-09 ASSESSMENT — FIBROSIS 4 INDEX: FIB4 SCORE: 1.34

## 2020-04-09 NOTE — PROGRESS NOTES
Patient in pre-op, assessment completed, patient updated on plan of care, all questions answered, no further needs at this time, call light within reach.

## 2020-04-09 NOTE — DISCHARGE INSTRUCTIONS
ACTIVITY: Rest and take it easy for the first 24 hours.  A responsible adult is recommended to remain with you during that time.  It is normal to feel sleepy.  We encourage you to not do anything that requires balance, judgment or coordination.    MILD FLU-LIKE SYMPTOMS ARE NORMAL. YOU MAY EXPERIENCE GENERALIZED MUSCLE ACHES, THROAT IRRITATION, HEADACHE AND/OR SOME NAUSEA.    FOR 24 HOURS DO NOT:  Drive, operate machinery or run household appliances.  Drink beer or alcoholic beverages.   Make important decisions or sign legal documents.    SPECIAL INSTRUCTIONS:     Weight Bearing As Tolerated to Left Lower Extremity in boot   Boot at all times   Elevate as much as possible     DIET: To avoid nausea, slowly advance diet as tolerated, avoiding spicy or greasy foods for the first day.  Add more substantial food to your diet according to your physician's instructions.  Babies can be fed formula or breast milk as soon as they are hungry.  INCREASE FLUIDS AND FIBER TO AVOID CONSTIPATION.    SURGICAL DRESSING/BATHING: Do not get wound wet.    FOLLOW-UP APPOINTMENT:  A follow-up appointment should be arranged with your doctor; call to schedule.  Follow up with [facility] ([phone])    You should CALL YOUR PHYSICIAN if you develop:  Fever greater than 101 degrees F.  Pain not relieved by medication, or persistent nausea or vomiting.  Excessive bleeding (blood soaking through dressing) or unexpected drainage from the wound.  Extreme redness or swelling around the incision site, drainage of pus or foul smelling drainage.  Inability to urinate or empty your bladder within 8 hours.  Problems with breathing or chest pain.    You should call 911 if you develop problems with breathing or chest pain.  If you are unable to contact your doctor or surgical center, you should go to the nearest emergency room or urgent care center.  Physician's telephone #: Dr Huynh [phone]    If any questions arise, call your doctor.   If your doctor is not available, please feel free to call the Surgical Center at [phone].  The Center is open Monday through Friday from 7AM to 7PM.  You can also call the HEALTH HOTLINE open 24 hours/day, 7 days/week and speak to a nurse at [phone], or toll free at [phone].    A registered nurse may call you a few days after your surgery to see how you are doing after your procedure.    MEDICATIONS: Resume taking daily medication.  Take prescribed pain medication with food.  If no medication is prescribed, you may take non-aspirin pain medication if needed.  PAIN MEDICATION CAN BE VERY CONSTIPATING.  Take a stool softener or laxative such as senokot, pericolace, or milk of magnesia if needed.    Prescription given for NORCO.  Last pain medication given at 12:37pm.    If your physician has prescribed pain medication that includes Acetaminophen (Tylenol), do not take additional Acetaminophen (Tylenol) while taking the prescribed medication.    Depression / Suicide Risk    As you are discharged from this [facility] facility, it is important to learn how to keep safe from harming yourself.    Recognize the warning signs:  · Abrupt changes in personality, positive or negative- including increase in energy   · Giving away possessions  · Change in eating patterns- significant weight changes-  positive or negative  · Change in sleeping patterns- unable to sleep or sleeping all the time   · Unwillingness or inability to communicate  · Depression  · Unusual sadness, discouragement and loneliness  · Talk of wanting to die  · Neglect of personal appearance   · Rebelliousness- reckless behavior  · Withdrawal from people/activities they love  · Confusion- inability to concentrate     If you or a loved one observes any of these behaviors or has concerns about self-harm, here's what you can do:  · Talk about it- your feelings and reasons for harming yourself  · Remove any means that you might use to hurt  yourself (examples: pills, rope, extension cords, firearm)  · Get professional help from the community (Mental Health, Substance Abuse, psychological counseling)  · Do not be alone:Call your Safe Contact- someone whom you trust who will be there for you.  · Call your local CRISIS HOTLINE 504-7170 or 257-871-2749  · Call your local Children's Mobile Crisis Response Team [place] (992) 436-7116 or www.[employer]  · Call the toll free National Suicide Prevention Hotlines   · National Suicide Prevention Lifeline 125-699-DOSL (9031)  · National Hope Line Network 800-SUICIDE (641-2808)

## 2020-04-09 NOTE — ANESTHESIA PROCEDURE NOTES
Airway  Date/Time: 4/9/2020 11:23 AM  Performed by: Danny Goff M.D.  Authorized by: Danny Goff M.D.     Location:  OR  Urgency:  Elective  Indications for Airway Management:  Anesthesia      Spontaneous Ventilation: absent    Sedation Level:  Deep  Preoxygenated: Yes    Patient Position:  Sniffing  Final Airway Type:  Endotracheal airway  Final Endotracheal Airway:  ETT  Cuffed: Yes    Technique Used for Successful ETT Placement:  Direct laryngoscopy  Insertion Site:  Oral  Blade Type:  Currie  Laryngoscope Blade/Videolaryngoscope Blade Size:  3  ETT Size (mm):  8.0  Measured from:  Teeth  ETT to Teeth (cm):  24  Placement Verified by: auscultation and capnometry    Cormack-Lehane Classification:  Grade I - full view of glottis  Number of Attempts at Approach:  1

## 2020-04-09 NOTE — OR SURGEON
Immediate Post OP Note    PreOp Diagnosis: Left chronic Achilles wound breakdown    PostOp Diagnosis: Same    Procedure(s):  IRRIGATION AND DEBRIDEMENT, WOUND - ACHILLES, ACELL, WOUND VAC PLACEMENT WITH WOUND CLOSURE AND SECONDARY - Wound Class: Dirty or Infected    Surgeon(s):  Kal Huynh M.D.    Anesthesiologist/Type of Anesthesia:  Anesthesiologist: Danyn Goff M.D./General    Surgical Staff:  Circulator: Regine Steen R.N.  Relief Circulator: Imani Gaffney R.N.  Scrub Person: Luis Carlos Duron    Specimens removed if any:  ID Type Source Tests Collected by Time Destination   1 : Lerft achilles Other Other FUNGAL CULTURE, AEROBIC/ANAEROBIC CULTURE (SURGERY) Kal Huynh M.D. 4/9/2020 11:38 AM        Estimated Blood Loss: 25cc    TT: 22 min @ 250mmHg    Findings: 4cm x 3cm full thickness wound, extensive scarring and adherence of tendon to surrounding tissue, amenable tissue for repair and closure    Complications: None        4/9/2020 12:24 PM Kal Huynh M.D.

## 2020-04-09 NOTE — OR NURSING
1250:     Pt noted to be in bigeminy rhythm then in trigeminy. Called MD Goff to update and he came to bedside to assess pt. Pt is asymptomatic and denies any heart history. Orders given to watch and see if he returns to SR. MD Goff to come back to check on pt after his next case. All other VSS. Will continue to monitor.     1327: MD Goff back to bedside to check on pt. Pt still going in and out of bigeminy, trigeminy, and SR. No new orders received and stated pt is ok to go home.     Pt pain decreasing with pain medication and nausea has subsided. VSS. Pt has boot in place on left leg and leg is elevated on pillows. Pt is resting in bed and has been updated on plan of care. All questions addressed. Tolerating water without issue. Updated pt wife and all questions answered. Will continue to monitor.

## 2020-04-09 NOTE — ANESTHESIA PREPROCEDURE EVALUATION
Relevant Problems   NEURO   (+) History of trauma      Other   (+) Arthritis       Physical Exam    Airway   Mallampati: II  TM distance: <3 FB  Neck ROM: full       Cardiovascular   Rhythm: regular  Rate: normal     Dental    Pulmonary   Breath sounds clear to auscultation     Abdominal    Neurological - normal exam                 Anesthesia Plan    ASA 2       Plan - general       Airway plan will be ETT        Induction: intravenous          Informed Consent:    Anesthetic plan and risks discussed with patient.

## 2020-04-09 NOTE — ANESTHESIA TIME REPORT
Anesthesia Start and Stop Event Times     Date Time Event    4/9/2020 1116 Anesthesia Start     1119 Ready for Procedure     1232 Anesthesia Stop        Responsible Staff  04/09/20    Name Role Begin End    Danny Goff M.D. Anesth 1116 1232        Preop Diagnosis (Free Text):  Pre-op Diagnosis     ULCER OF LEFT ANKLE, ANKLE PAIN LEFT (WOUND NEEDS DEBRIDED BEFORE IT CAUSES LOSS OF LIMB)        Preop Diagnosis (Codes):    Post op Diagnosis  Ulcer of left ankle (HCC)      Premium Reason  Non-Premium    Comments:

## 2020-04-09 NOTE — OR NURSING
1356- Pt arrives to phase 2 in stretcher, sts he would like to stay in the stretcher.  Wound vac noted to left booted wound.     1400- Pt tolerating water, spoke with pt's wife, sts she will meet us down stairs when he is ready to go.  Pt voided in urinal.    1420- Pt sts ready to go, changed with help.  Wound vac teaching provided to pt.    1425- Spoke with pt's wife, sts she will head this way.  IV removed.    1444- Pt taken to car via w/c.  D/c paperwork and instructions provided to pt and wife, script for NORCO.  All questions answered.

## 2020-04-09 NOTE — ANESTHESIA POSTPROCEDURE EVALUATION
Patient: Rob Clifton    Procedure Summary     Date:  04/09/20 Room / Location:  [facility] 12 / SURGERY [facility]    Anesthesia Start:  1116 Anesthesia Stop:  1232    Procedure:  IRRIGATION AND DEBRIDEMENT, WOUND - ACHILLES, ACELL, WOUND VAC PLACEMENT WITH WOUND CLOSURE AND SECONDARY (Left Leg Lower) Diagnosis:  (ULCER OF LEFT ANKLE, ANKLE PAIN LEFT (WOUND NEEDS DEBRIDED BEFORE IT CAUSES LOSS OF LIMB))    Surgeon:  Kal Huynh M.D. Responsible Provider:  Danny Goff M.D.    Anesthesia Type:  general ASA Status:  2          Final Anesthesia Type: general  Last vitals  BP   Blood Pressure : 129/67    Temp   36.2 °C (97.2 °F)    Pulse   Pulse: 81   Resp   12    SpO2   100 %      Anesthesia Post Evaluation    Patient location during evaluation: PACU  Patient participation: complete - patient participated  Level of consciousness: awake and alert    Airway patency: patent  Anesthetic complications: no  Cardiovascular status: hemodynamically stable  Respiratory status: acceptable  Hydration status: euvolemic    PONV: none           Nurse Pain Score: 5 (NPRS)

## 2020-04-10 ENCOUNTER — TELEPHONE (OUTPATIENT)
Dept: WOUND CARE | Facility: MEDICAL CENTER | Age: 70
End: 2020-04-10

## 2020-04-10 ENCOUNTER — OFFICE VISIT (OUTPATIENT)
Dept: WOUND CARE | Facility: MEDICAL CENTER | Age: 70
End: 2020-04-10
Attending: ORTHOPAEDIC SURGERY
Payer: COMMERCIAL

## 2020-04-10 DIAGNOSIS — M67.972 ACHILLES TENDON DISORDER, LEFT: ICD-10-CM

## 2020-04-10 DIAGNOSIS — L08.9 WOUND INFECTION: ICD-10-CM

## 2020-04-10 DIAGNOSIS — T14.8XXA WOUND INFECTION: ICD-10-CM

## 2020-04-10 PROCEDURE — 99214 OFFICE O/P EST MOD 30 MIN: CPT | Performed by: NURSE PRACTITIONER

## 2020-04-10 PROCEDURE — 99214 OFFICE O/P EST MOD 30 MIN: CPT

## 2020-04-10 ASSESSMENT — ENCOUNTER SYMPTOMS
DEPRESSION: 0
SHORTNESS OF BREATH: 0
CONSTIPATION: 0
NAUSEA: 1
VOMITING: 0
NERVOUS/ANXIOUS: 0
MYALGIAS: 0
COUGH: 0
FEVER: 0
DIZZINESS: 1
CHILLS: 0
DIARRHEA: 0
CLAUDICATION: 0

## 2020-04-10 NOTE — PROGRESS NOTES
Provider Encounter- Full Thickness wound    HISTORY OF PRESENT ILLNESS  Wound History:    START OF CARE IN CLINIC: 4/10/2020 (return to clinic after surgery)    REFERRING PROVIDER: Kal Huynh MD     WOUND- Full Thickness Wound   LOCATION: Left Achilles tendon.   HISTORY:  Mr. Clifton is an otherwise healthy man who presented to the clinic with a full-thickness wound to his Achilles, over site of remote injury, which recurred spontaneously.  He has a history of several orthopedic surgeries, over 10-20 years ago and has tibial sarah as well as a healed fibula fracture.  He has had no problems with these injuries recently, but noticed a new concerning area in late January early February of this year, that became a full thickness wound.   He was seen by orthopedic surgeon, Dr. Kal Huynh, who recommended an MRI and wound care.   Patient was then taken to surgery by Dr. Huynh on 4/9/2020 for an I&D, application of ACell, wound closure and incisional VAC placement.  Patient called clinic the following day with concerns about the VAC.  VAC had been alarming since the previous night, and neither he or his wife know how to troubleshoot.  He was brought back into the clinic on 4/10 for evaluation.         Pertinent Medical History: none     TOBACCO USE: none    Patient's problem list, allergies, and current medications reviewed and updated in Epic    Interval History:  4/10/2020: Clinic visit with ARIANA Beltrán.  Patient presents today with his wife.  They are both quite upset, stated they were given no instruction on the wound VAC whatsoever, and VAC had been alarming since last night.  Low battery warning noted on screen of pump.  Patient did have some dizziness and sweating towards the end of visit.  Vital signs were stable felt better after lying down for approximately 15 minutes, was able to then go home      REVIEW OF SYSTEMS:   Review of Systems   Constitutional: Negative for chills and fever.    Respiratory: Negative for cough and shortness of breath.    Cardiovascular: Negative for chest pain, claudication and leg swelling.   Gastrointestinal: Positive for nausea. Negative for constipation, diarrhea and vomiting.   Genitourinary: Negative for dysuria.   Musculoskeletal: Negative for myalgias.   Skin:        Open wound over the distal Achilles   Neurological: Positive for dizziness.        Patient experienced some nausea and dizziness toward the end of visit, dissipated after lying down for approximately 15 minutes   Psychiatric/Behavioral: Negative for depression. The patient is not nervous/anxious.    All other systems reviewed and are negative.      PHYSICAL EXAMINATION:       Physical Exam   Constitutional: He is well-developed, well-nourished, and in no distress.   HENT:   Head: Normocephalic and atraumatic.   Eyes: Pupils are equal, round, and reactive to light. Conjunctivae are normal.   Neck: Normal range of motion. Neck supple. No tracheal deviation present.   Cardiovascular: Normal rate and regular rhythm.   Right PT pulse intact.     Pulmonary/Chest: Effort normal. No respiratory distress.   Abdominal: Soft. He exhibits no distension.   Musculoskeletal:         General: No edema.   Skin:   Surgical dressing intact over Achilles incision, incisional VAC, secured with Ioban   Psychiatric: Mood, memory, affect and judgment normal.       WOUND ASSESSMENT-surgical dressing in place, unable to assess            PROCEDURE:   -Removed boot, Ace wrap and soft roll  -Assessed dressing over incision, noted to be intact, no leaks  -Went over troubleshooting of VAC with patient and his wife was shown how to deal with: Full canister, low battery, blockage.  I also showed them where to find a phone number to call [facility] at any time  -Tubigrip placed over lower extremity, hole cut for VAC tubing  -Boot replaced, tubing run out the side of the boot so as not to press against his skin      Pertinent Labs and  Diagnostics    IMAGIN/15/18  FINDINGS:  Intramedullary sarah traverses a healed distal tibial diaphyseal fracture. There is an angulated healed distal fibular diaphyseal fracture. No acute fracture or dislocation is seen. Bones are demineralized. There is mild soft tissue swelling. There is   spurring of the calcaneus at the insertion of the Achilles tendon. Atherosclerotic calcification is seen.     IMPRESSION:     Postsurgical and old posttraumatic changes of the distal tibia.     Healed angulated fracture of the distal fibula diaphysis.     Mild soft tissue swelling.     Demineralization.     Arterial calcification    VASCULAR STUDIES: pending    LAST  WOUND CULTURE:   Date: 3/23/2020   results: Moderate growth MSSA      ASSESSMENT AND PLAN:     1. Achilles tendon disorder, left  Comments: Spontaneous opening of the wound over old wound site.  Patient has history of Achilles rupture and repair, along with open wound from 34 years ago.  Patient underwent surgical I&D, ACell application, and closure of wound with incisional VAC on 2020    4/10/2020: Patient presented to clinic today due to harming of incisional VAC.  States neither he or his wife were instructed on how to manage VAC.  -Ace wrap and Sof-Roll removed, incisional VAC dressing intact, no leaks  -VAC troubleshooting discussed and demonstrated with patient and his wife  -Tubigrip placed instead of Ace wrap  -Patient to return to clinic on  for removal of incisional VAC and assessment of surgical site      Wound care: Incisional VAC at 125 mmHg.  To be removed at 7 days postop      2. Wound infection    4/10/2020: No signs or symptoms of infection today  -OR cultures pending  -Monitor at each clinic visit      PATIENT EDUCATION  - Importance of adequate nutrition for wound healing  -Advised to go to ER for any increased redness, swelling, drainage, or odor, or if patient develops fever, chills, nausea or vomiting.        Patient was seen for 30  minutes face to face of which > 50% of appointment time was spent on counseling and coordination of care regarding the above.    Please note that this note may have been created using voice recognition software. I have worked with technical experts from [facility] to optimize the interface.  I have made every reasonable attempt to correct obvious errors, but there may be errors of grammar and possibly content that I did not discover before finalizing the note.

## 2020-04-10 NOTE — TELEPHONE ENCOUNTER
"Received a phone call from patient's wife Haris. She states that the patient's wound vac turned off overnight, turned back on by itself, then turned off again. The wound vac has been turned off for over four hours.     Haris states \"we were given no instructions\" on how the wound vac works, and they did not know that the wound vac battery needs to be recharged periodically. Haris had left the wound vac supplies in her car, and she found the wound vac  in her car.    Reviewed chart, patient had Acell applied to the wound by Dr. Huynh on 4/9/2020.    Advised Haris that the patient can come in today at 11am to have his wound vac replaced only.  "

## 2020-04-13 ENCOUNTER — APPOINTMENT (OUTPATIENT)
Dept: WOUND CARE | Facility: MEDICAL CENTER | Age: 70
End: 2020-04-13
Attending: ORTHOPAEDIC SURGERY
Payer: COMMERCIAL

## 2020-04-13 ENCOUNTER — APPOINTMENT (OUTPATIENT)
Dept: WOUND CARE | Facility: MEDICAL CENTER | Age: 70
End: 2020-04-13
Attending: NURSE PRACTITIONER
Payer: COMMERCIAL

## 2020-04-14 LAB
BACTERIA SPEC ANAEROBE CULT: ABNORMAL
BACTERIA SPEC ANAEROBE CULT: ABNORMAL
BACTERIA TISS AEROBE CULT: ABNORMAL
GRAM STN SPEC: ABNORMAL
SIGNIFICANT IND 70042: ABNORMAL
SIGNIFICANT IND 70042: ABNORMAL
SITE SITE: ABNORMAL
SITE SITE: ABNORMAL
SOURCE SOURCE: ABNORMAL
SOURCE SOURCE: ABNORMAL

## 2020-04-15 ENCOUNTER — OFFICE VISIT (OUTPATIENT)
Dept: WOUND CARE | Facility: MEDICAL CENTER | Age: 70
End: 2020-04-15
Attending: NURSE PRACTITIONER
Payer: COMMERCIAL

## 2020-04-15 ENCOUNTER — TELEPHONE (OUTPATIENT)
Dept: WOUND CARE | Facility: MEDICAL CENTER | Age: 70
End: 2020-04-15

## 2020-04-15 VITALS
OXYGEN SATURATION: 95 % | HEART RATE: 77 BPM | RESPIRATION RATE: 16 BRPM | DIASTOLIC BLOOD PRESSURE: 90 MMHG | SYSTOLIC BLOOD PRESSURE: 127 MMHG | TEMPERATURE: 97.1 F

## 2020-04-15 DIAGNOSIS — T14.8XXA WOUND INFECTION: ICD-10-CM

## 2020-04-15 DIAGNOSIS — L08.9 WOUND INFECTION: ICD-10-CM

## 2020-04-15 PROCEDURE — 99213 OFFICE O/P EST LOW 20 MIN: CPT

## 2020-04-15 PROCEDURE — 99214 OFFICE O/P EST MOD 30 MIN: CPT | Performed by: NURSE PRACTITIONER

## 2020-04-15 RX ORDER — HYDROXYZINE PAMOATE 50 MG/1
50 CAPSULE ORAL EVERY 6 HOURS PRN
Status: ON HOLD | COMMUNITY
Start: 2020-04-07 | End: 2020-04-17 | Stop reason: SDUPTHER

## 2020-04-15 ASSESSMENT — ENCOUNTER SYMPTOMS
DIARRHEA: 0
DEPRESSION: 0
MYALGIAS: 0
VOMITING: 0
SHORTNESS OF BREATH: 0
FEVER: 0
CLAUDICATION: 0
DIZZINESS: 1
NAUSEA: 1
CONSTIPATION: 0
NERVOUS/ANXIOUS: 0
CHILLS: 0
COUGH: 0

## 2020-04-15 NOTE — PROGRESS NOTES
Provider Encounter- Full Thickness wound    HISTORY OF PRESENT ILLNESS  Wound History:    START OF CARE IN CLINIC: 4/10/2020 (return to clinic after surgery)    REFERRING PROVIDER: Kal Huynh MD     WOUND- Full Thickness Wound   LOCATION: Left Achilles tendon.   HISTORY:  Mr. Clifton is an otherwise healthy man who presented to the clinic with a full-thickness wound to his Achilles, over site of remote injury, which recurred spontaneously.  He has a history of several orthopedic surgeries, over 10-20 years ago and has tibial sarah as well as a healed fibula fracture.  He has had no problems with these injuries recently, but noticed a new concerning area in late January early February of this year, that became a full thickness wound.   He was seen by orthopedic surgeon, Dr. Kal Huynh, who recommended an MRI and wound care.   Patient was then taken to surgery by Dr. Huynh on 4/9/2020 for an I&D, application of ACell, wound closure and incisional VAC placement.  Patient called clinic the following day with concerns about the VAC.  VAC had been alarming since the previous night, and neither he or his wife know how to troubleshoot.  He was brought back into the clinic on 4/10 for evaluation.         Pertinent Medical History: none     TOBACCO USE: none    Patient's problem list, allergies, and current medications reviewed and updated in Epic    Interval History:  4/10/2020: Clinic visit with ARIANA Beltrán.  Patient presents today with his wife.  They are both quite upset, stated they were given no instruction on the wound VAC whatsoever, and VAC had been alarming since last night.  Low battery warning noted on screen of pump.  Patient did have some dizziness and sweating towards the end of visit.  Vital signs were stable felt better after lying down for approximately 15 minutes, was able to then go home    4/15/2020 : Clinic visit with ARIANA Beltrán.  Received phone call from patient's wife earlier  "today, she stated, \"he is not doing well… can barely stay awake… not eating…\".  (See telephone encounter note\".  After confirming that patient would be able to get into the clinic without assist, I agreed to see him today.  Patient was able to walk in on his own accord, and was alert and oriented.  Vital signs stable.  His VAC pump was disconnected from his wound, he stated he only disconnected it earlier this morning.  However, wife states that he had been throwing it off several hours per day.  Patient informed me that he thinks it was the Norco that was making him ill, he feels better since he is stopped taking it.  He is a bit jaundiced today, I advised him to stop taking Bactrim and Norco altogether, and I ordered a CMP.  I also informed Dr. Huynh via text.    REVIEW OF SYSTEMS:   Review of Systems   Constitutional: Negative for chills and fever.   Respiratory: Negative for cough and shortness of breath.    Cardiovascular: Negative for chest pain, claudication and leg swelling.   Gastrointestinal: Positive for nausea. Negative for constipation, diarrhea and vomiting.   Genitourinary: Negative for dysuria.   Musculoskeletal: Negative for myalgias.   Skin:        Open wound over the distal Achilles   Neurological: Positive for dizziness.        Patient experienced some nausea and dizziness toward the end of visit, dissipated after lying down for approximately 15 minutes   Psychiatric/Behavioral: Negative for depression. The patient is not nervous/anxious.    All other systems reviewed and are negative.      PHYSICAL EXAMINATION:       Physical Exam   Constitutional: He is well-developed, well-nourished, and in no distress.   HENT:   Head: Normocephalic and atraumatic.   Eyes: Pupils are equal, round, and reactive to light. Conjunctivae are normal.   Neck: Normal range of motion. Neck supple. No tracheal deviation present.   Cardiovascular: Normal rate and regular rhythm.   Right PT pulse intact.   "   Pulmonary/Chest: Effort normal. No respiratory distress.   Abdominal: Soft. He exhibits no distension.   Musculoskeletal:         General: No edema.   Skin:   Surgical incision with sutures over right Achilles, areas of ischemia, no drainage    Patient skin overall appears to be jaundiced  Sclera mildly jaundiced   Psychiatric: Mood, memory, affect and judgment normal.       WOUND ASSESSMENT-surgical incision, sutures in place, scattered areas of  ischemia along incision line, no appreciable drainage.                PROCEDURE:   -Removed boot, Ace wrap and soft roll  -Removed incisional VAC dressing  -Wound care completed by wound RN, refer to flowsheet and photos  -Tubigrip applied to lower extremity  -Offloading boot applied      Pertinent Labs and Diagnostics    IMAGIN/15/18  FINDINGS:  Intramedullary sarah traverses a healed distal tibial diaphyseal fracture. There is an angulated healed distal fibular diaphyseal fracture. No acute fracture or dislocation is seen. Bones are demineralized. There is mild soft tissue swelling. There is   spurring of the calcaneus at the insertion of the Achilles tendon. Atherosclerotic calcification is seen.     IMPRESSION:     Postsurgical and old posttraumatic changes of the distal tibia.     Healed angulated fracture of the distal fibula diaphysis.     Mild soft tissue swelling.     Demineralization.     Arterial calcification    VASCULAR STUDIES: pending    LAST  WOUND CULTURE:   Date: 3/23/2020   results: Moderate growth MSSA      ASSESSMENT AND PLAN:     1. Achilles tendon disorder, left  Comments: Spontaneous opening of the wound over old wound site.  Patient has history of Achilles rupture and repair, along with open wound from 34 years ago.  Patient underwent surgical I&D, ACell application, and closure of wound with incisional VAC on 2020    4/15/2020: Patient seen today, 1 day prior to schedule appointment, due to wife's concerns that he is not doing well.  Per  wife, patient has been lethargic, slurring his speech, and not eating well.  He presents today alert and oriented.  VAC over incision disconnected.  -Incisional VAC dressing removed  -Wound assessed, findings discussed with Dr. Huynh  -Patient to follow-up with Dr. Huynh next week  -He is discharged to [facility] as he has no open wounds    Wound care: Adaptic over incision, silicone foam cover dressing, Tubigrip to lower extremity      2.  Jaundice    4/15/2020: Patient presents today with generalized jaundice of his skin, mild jaundice of the sclera  -Patient instructed to stop taking Bactrim and Norco altogether  -CMP ordered to assess liver function tests  -Dr. Huynh informed    PATIENT EDUCATION    -Advised to go to ER for any increased redness, swelling, drainage, or odor, or if patient develops fever, chills, nausea or vomiting.        Patient was seen for 30 minutes face to face of which > 50% of appointment time was spent on counseling and coordination of care regarding the above.        Please note that this note may have been created using voice recognition software. I have worked with technical experts from [employer] to optimize the interface.  I have made every reasonable attempt to correct obvious errors, but there may be errors of grammar and possibly content that I did not discover before finalizing the note.

## 2020-04-15 NOTE — PROGRESS NOTES
Provider Encounter- Full Thickness wound    HISTORY OF PRESENT ILLNESS  Wound History:    START OF CARE IN CLINIC: 4/10/2020 (return to clinic after surgery)    REFERRING PROVIDER: Kal Huynh MD     WOUND- Full Thickness Wound   LOCATION: Left Achilles tendon.   HISTORY:  Mr. Clifton is an otherwise healthy man who presented to the clinic with a full-thickness wound to his Achilles, over site of remote injury, which recurred spontaneously.  He has a history of several orthopedic surgeries, over 10-20 years ago and has tibial sarah as well as a healed fibula fracture.  He has had no problems with these injuries recently, but noticed a new concerning area in late January early February of this year, that became a full thickness wound.   He was seen by orthopedic surgeon, Dr. Kal Huynh, who recommended an MRI and wound care.   Patient was then taken to surgery by Dr. Huynh on 4/9/2020 for an I&D, application of ACell, wound closure and incisional VAC placement.  Patient called clinic the following day with concerns about the VAC.  VAC had been alarming since the previous night, and neither he or his wife know how to troubleshoot.  He was brought back into the clinic on 4/10 for evaluation.         Pertinent Medical History: none     TOBACCO USE: none    Patient's problem list, allergies, and current medications reviewed and updated in Epic    Interval History:  4/10/2020: Clinic visit with ARIANA Beltrán.  Patient presents today with his wife.  They are both quite upset, stated they were given no instruction on the wound VAC whatsoever, and VAC had been alarming since last night.  Low battery warning noted on screen of pump.  Patient did have some dizziness and sweating towards the end of visit.  Vital signs were stable felt better after lying down for approximately 15 minutes, was able to then go home    4/15/2020 : Clinic visit with ARIANA Beltrán.  Received phone call from patient's wife earlier  "today, she stated, \"he is not doing well… can barely stay awake… not eating…\".  (See telephone encounter note).  After confirming that patient would be able to get into the clinic without assist, I agreed to see him today.  Patient was able to walk in on his own accord, and was alert and oriented.  Vital signs stable.  His VAC pump was disconnected from his wound, he stated he only disconnected it earlier this morning.  However, wife states that he had been throwing it off several hours per day.  Patient informed me that he thinks it was the Norco that was making him ill, he feels better since he is stopped taking it.  He is a bit jaundiced today, I advised him to stop taking Bactrim and Norco altogether, and I ordered a CMP.  I also informed Dr. Huynh via text.    REVIEW OF SYSTEMS:   Review of Systems   Constitutional: Negative for chills and fever.   Respiratory: Negative for cough and shortness of breath.    Cardiovascular: Negative for chest pain, claudication and leg swelling.   Gastrointestinal: Positive for nausea. Negative for constipation, diarrhea and vomiting.   Genitourinary: Negative for dysuria.   Musculoskeletal: Negative for myalgias.   Skin:        Open wound over the distal Achilles   Neurological: Positive for dizziness.        Patient experienced some nausea and dizziness toward the end of visit, dissipated after lying down for approximately 15 minutes   Psychiatric/Behavioral: Negative for depression. The patient is not nervous/anxious.    All other systems reviewed and are negative.      PHYSICAL EXAMINATION:       Physical Exam   Constitutional: He is well-developed, well-nourished, and in no distress.   HENT:   Head: Normocephalic and atraumatic.   Eyes: Pupils are equal, round, and reactive to light. Conjunctivae are normal.   Neck: Normal range of motion. Neck supple. No tracheal deviation present.   Cardiovascular: Normal rate and regular rhythm.   Right PT pulse intact.   "   Pulmonary/Chest: Effort normal. No respiratory distress.   Abdominal: Soft. He exhibits no distension.   Musculoskeletal:         General: No edema.   Skin:   Surgical incision with sutures over right Achilles, areas of ischemia, no drainage    Patient skin overall appears to be jaundiced  Sclera mildly jaundiced   Psychiatric: Mood, memory, affect and judgment normal.       WOUND ASSESSMENT-surgical incision, sutures in place, scattered areas of  ischemia along incision line, no appreciable drainage.                PROCEDURE:   -Removed boot, Ace wrap and soft roll  -Removed incisional VAC dressing  -Wound care completed by wound RN, refer to flowsheet and photos  -Tubigrip applied to lower extremity  -Offloading boot applied      Pertinent Labs and Diagnostics    IMAGIN/15/18  FINDINGS:  Intramedullary sarah traverses a healed distal tibial diaphyseal fracture. There is an angulated healed distal fibular diaphyseal fracture. No acute fracture or dislocation is seen. Bones are demineralized. There is mild soft tissue swelling. There is   spurring of the calcaneus at the insertion of the Achilles tendon. Atherosclerotic calcification is seen.     IMPRESSION:     Postsurgical and old posttraumatic changes of the distal tibia.     Healed angulated fracture of the distal fibula diaphysis.     Mild soft tissue swelling.     Demineralization.     Arterial calcification    VASCULAR STUDIES: pending    LAST  WOUND CULTURE:   Date: 3/23/2020   results: Moderate growth MSSA      ASSESSMENT AND PLAN:     1. Achilles tendon disorder, left  Comments: Spontaneous opening of the wound over old wound site.  Patient has history of Achilles rupture and repair, along with open wound from 34 years ago.  Patient underwent surgical I&D, ACell application, and closure of wound with incisional VAC on 2020    4/15/2020: Patient seen today, 1 day prior to schedule appointment, due to wife's concerns that he is not doing well.  Per  wife, patient has been lethargic, slurring his speech, and not eating well.  He presents today alert and oriented.  VAC over incision disconnected.  -Incisional VAC dressing removed  -Wound assessed, findings discussed with Dr. Huynh  -Patient to follow-up with Dr. Huynh next week  -He is discharged to [facility] as he has no open wounds    Wound care: Adaptic over incision, silicone foam cover dressing, Tubigrip to lower extremity      2.  Jaundice    4/15/2020: Patient presents today with generalized jaundice of his skin, mild jaundice of the sclera  -Patient instructed to stop taking Bactrim and Norco altogether  -CMP ordered to assess liver function tests  -Dr. Huynh informed    PATIENT EDUCATION    -Advised to go to ER for any increased redness, swelling, drainage, or odor, or if patient develops fever, chills, nausea or vomiting.        Patient was seen for 30 minutes face to face of which > 50% of appointment time was spent on counseling and coordination of care regarding the above.        Please note that this note may have been created using voice recognition software. I have worked with technical experts from [employer] to optimize the interface.  I have made every reasonable attempt to correct obvious errors, but there may be errors of grammar and possibly content that I did not discover before finalizing the note.

## 2020-04-15 NOTE — TELEPHONE ENCOUNTER
Received phone call from patient's wife, Haris.  She states she is worried about Burton, states he is lethargic, slurred speech.  She states he does not have fever or cough.  He has had bouts of constipation, taking laxative.  Appetite has been poor.  She states she is tried to contact Dr. Huynh, however is not called her back.  She does not feel he needs to go to the hospital however.  Patient was supposed to be seen in clinic tomorrow, however arrangements made for him to come in today at 1 PM.

## 2020-04-16 ENCOUNTER — APPOINTMENT (OUTPATIENT)
Dept: RADIOLOGY | Facility: MEDICAL CENTER | Age: 70
End: 2020-04-16
Attending: INTERNAL MEDICINE
Payer: COMMERCIAL

## 2020-04-16 ENCOUNTER — APPOINTMENT (OUTPATIENT)
Dept: WOUND CARE | Facility: MEDICAL CENTER | Age: 70
End: 2020-04-16
Attending: NURSE PRACTITIONER
Payer: COMMERCIAL

## 2020-04-16 ENCOUNTER — HOSPITAL ENCOUNTER (OUTPATIENT)
Facility: MEDICAL CENTER | Age: 70
End: 2020-04-17
Attending: EMERGENCY MEDICINE | Admitting: INTERNAL MEDICINE
Payer: COMMERCIAL

## 2020-04-16 ENCOUNTER — HOSPITAL ENCOUNTER (OUTPATIENT)
Dept: LAB | Facility: MEDICAL CENTER | Age: 70
End: 2020-04-16
Attending: NURSE PRACTITIONER
Payer: COMMERCIAL

## 2020-04-16 ENCOUNTER — APPOINTMENT (OUTPATIENT)
Dept: RADIOLOGY | Facility: MEDICAL CENTER | Age: 70
End: 2020-04-16
Attending: EMERGENCY MEDICINE
Payer: COMMERCIAL

## 2020-04-16 DIAGNOSIS — R51.9 ACUTE INTRACTABLE HEADACHE, UNSPECIFIED HEADACHE TYPE: ICD-10-CM

## 2020-04-16 DIAGNOSIS — L08.9 WOUND INFECTION: ICD-10-CM

## 2020-04-16 DIAGNOSIS — T14.8XXA WOUND INFECTION: ICD-10-CM

## 2020-04-16 DIAGNOSIS — K75.9 HEPATITIS: ICD-10-CM

## 2020-04-16 DIAGNOSIS — R17 JAUNDICE: ICD-10-CM

## 2020-04-16 DIAGNOSIS — K81.9 CHOLECYSTITIS: ICD-10-CM

## 2020-04-16 PROBLEM — E87.1 HYPONATREMIA: Status: ACTIVE | Noted: 2020-04-16

## 2020-04-16 PROBLEM — S91.002A WOUND OF LEFT ANKLE: Status: ACTIVE | Noted: 2020-04-16

## 2020-04-16 PROBLEM — R79.89 LFT ELEVATION: Status: ACTIVE | Noted: 2020-04-16

## 2020-04-16 PROBLEM — R01.1 MURMUR: Status: ACTIVE | Noted: 2020-04-16

## 2020-04-16 LAB
ALBUMIN SERPL BCP-MCNC: 3.6 G/DL (ref 3.2–4.9)
ALBUMIN SERPL BCP-MCNC: 3.6 G/DL (ref 3.2–4.9)
ALBUMIN/GLOB SERPL: 1.2 G/DL
ALBUMIN/GLOB SERPL: 1.2 G/DL
ALP SERPL-CCNC: 623 U/L (ref 30–99)
ALP SERPL-CCNC: 634 U/L (ref 30–99)
ALT SERPL-CCNC: 367 U/L (ref 2–50)
ALT SERPL-CCNC: 386 U/L (ref 2–50)
ANION GAP SERPL CALC-SCNC: 13 MMOL/L (ref 7–16)
ANION GAP SERPL CALC-SCNC: 14 MMOL/L (ref 7–16)
APAP SERPL-MCNC: <5 UG/ML (ref 10–30)
APPEARANCE UR: CLEAR
AST SERPL-CCNC: 293 U/L (ref 12–45)
AST SERPL-CCNC: 331 U/L (ref 12–45)
BASOPHILS # BLD AUTO: 0.6 % (ref 0–1.8)
BASOPHILS # BLD: 0.04 K/UL (ref 0–0.12)
BILIRUB CONJ SERPL-MCNC: 8.1 MG/DL (ref 0.1–0.5)
BILIRUB INDIRECT SERPL-MCNC: 1.6 MG/DL (ref 0–1)
BILIRUB SERPL-MCNC: 9.5 MG/DL (ref 0.1–1.5)
BILIRUB SERPL-MCNC: 9.7 MG/DL (ref 0.1–1.5)
BILIRUB UR QL STRIP.AUTO: ABNORMAL
BUN SERPL-MCNC: 18 MG/DL (ref 8–22)
BUN SERPL-MCNC: 19 MG/DL (ref 8–22)
CALCIUM SERPL-MCNC: 8.8 MG/DL (ref 8.4–10.2)
CALCIUM SERPL-MCNC: 8.8 MG/DL (ref 8.5–10.5)
CHLORIDE SERPL-SCNC: 94 MMOL/L (ref 96–112)
CHLORIDE SERPL-SCNC: 95 MMOL/L (ref 96–112)
CO2 SERPL-SCNC: 21 MMOL/L (ref 20–33)
CO2 SERPL-SCNC: 22 MMOL/L (ref 20–33)
COLOR UR: ABNORMAL
CREAT SERPL-MCNC: 0.78 MG/DL (ref 0.5–1.4)
CREAT SERPL-MCNC: 0.83 MG/DL (ref 0.5–1.4)
EOSINOPHIL # BLD AUTO: 0.06 K/UL (ref 0–0.51)
EOSINOPHIL NFR BLD: 0.9 % (ref 0–6.9)
ERYTHROCYTE [DISTWIDTH] IN BLOOD BY AUTOMATED COUNT: 38.5 FL (ref 35.9–50)
GLOBULIN SER CALC-MCNC: 2.9 G/DL (ref 1.9–3.5)
GLOBULIN SER CALC-MCNC: 3.1 G/DL (ref 1.9–3.5)
GLUCOSE SERPL-MCNC: 104 MG/DL (ref 65–99)
GLUCOSE SERPL-MCNC: 119 MG/DL (ref 65–99)
GLUCOSE UR STRIP.AUTO-MCNC: NEGATIVE MG/DL
HAV IGM SERPL QL IA: NORMAL
HBV CORE IGM SER QL: NORMAL
HBV SURFACE AG SER QL: NORMAL
HCT VFR BLD AUTO: 40.5 % (ref 42–52)
HCV AB SER QL: NORMAL
HGB BLD-MCNC: 14.1 G/DL (ref 14–18)
IMM GRANULOCYTES # BLD AUTO: 0.03 K/UL (ref 0–0.11)
IMM GRANULOCYTES NFR BLD AUTO: 0.4 % (ref 0–0.9)
INR PPP: 0.86 (ref 0.87–1.13)
KETONES UR STRIP.AUTO-MCNC: 40 MG/DL
LEUKOCYTE ESTERASE UR QL STRIP.AUTO: NEGATIVE
LIPASE SERPL-CCNC: 53 U/L (ref 7–58)
LYMPHOCYTES # BLD AUTO: 0.75 K/UL (ref 1–4.8)
LYMPHOCYTES NFR BLD: 10.9 % (ref 22–41)
MCH RBC QN AUTO: 29.6 PG (ref 27–33)
MCHC RBC AUTO-ENTMCNC: 34.8 G/DL (ref 33.7–35.3)
MCV RBC AUTO: 85.1 FL (ref 81.4–97.8)
MICRO URNS: ABNORMAL
MONOCYTES # BLD AUTO: 0.65 K/UL (ref 0–0.85)
MONOCYTES NFR BLD AUTO: 9.4 % (ref 0–13.4)
NEUTROPHILS # BLD AUTO: 5.37 K/UL (ref 1.82–7.42)
NEUTROPHILS NFR BLD: 77.8 % (ref 44–72)
NITRITE UR QL STRIP.AUTO: NEGATIVE
NRBC # BLD AUTO: 0 K/UL
NRBC BLD-RTO: 0 /100 WBC
PH UR STRIP.AUTO: 5 [PH] (ref 5–8)
PLATELET # BLD AUTO: 223 K/UL (ref 164–446)
PMV BLD AUTO: 9.3 FL (ref 9–12.9)
POTASSIUM SERPL-SCNC: 4.1 MMOL/L (ref 3.6–5.5)
POTASSIUM SERPL-SCNC: 4.4 MMOL/L (ref 3.6–5.5)
PROT SERPL-MCNC: 6.5 G/DL (ref 6–8.2)
PROT SERPL-MCNC: 6.7 G/DL (ref 6–8.2)
PROT UR QL STRIP: NEGATIVE MG/DL
PROTHROMBIN TIME: 11.8 SEC (ref 12–14.6)
RBC # BLD AUTO: 4.76 M/UL (ref 4.7–6.1)
RBC UR QL AUTO: NEGATIVE
SODIUM SERPL-SCNC: 129 MMOL/L (ref 135–145)
SODIUM SERPL-SCNC: 130 MMOL/L (ref 135–145)
SP GR UR STRIP.AUTO: 1.02
VIT B12 SERPL-MCNC: 1615 PG/ML (ref 211–911)
WBC # BLD AUTO: 6.9 K/UL (ref 4.8–10.8)

## 2020-04-16 PROCEDURE — 74177 CT ABD & PELVIS W/CONTRAST: CPT

## 2020-04-16 PROCEDURE — 700101 HCHG RX REV CODE 250: Performed by: INTERNAL MEDICINE

## 2020-04-16 PROCEDURE — 700117 HCHG RX CONTRAST REV CODE 255: Performed by: EMERGENCY MEDICINE

## 2020-04-16 PROCEDURE — G0378 HOSPITAL OBSERVATION PER HR: HCPCS

## 2020-04-16 PROCEDURE — 96365 THER/PROPH/DIAG IV INF INIT: CPT

## 2020-04-16 PROCEDURE — 700102 HCHG RX REV CODE 250 W/ 637 OVERRIDE(OP): Performed by: INTERNAL MEDICINE

## 2020-04-16 PROCEDURE — 96366 THER/PROPH/DIAG IV INF ADDON: CPT

## 2020-04-16 PROCEDURE — 36415 COLL VENOUS BLD VENIPUNCTURE: CPT

## 2020-04-16 PROCEDURE — 700105 HCHG RX REV CODE 258: Performed by: EMERGENCY MEDICINE

## 2020-04-16 PROCEDURE — A9270 NON-COVERED ITEM OR SERVICE: HCPCS | Performed by: INTERNAL MEDICINE

## 2020-04-16 PROCEDURE — 82248 BILIRUBIN DIRECT: CPT

## 2020-04-16 PROCEDURE — 80053 COMPREHEN METABOLIC PANEL: CPT

## 2020-04-16 PROCEDURE — 99219 PR INITIAL OBSERVATION CARE,LEVL II: CPT | Performed by: INTERNAL MEDICINE

## 2020-04-16 PROCEDURE — 74181 MRI ABDOMEN W/O CONTRAST: CPT

## 2020-04-16 PROCEDURE — 85025 COMPLETE CBC W/AUTO DIFF WBC: CPT

## 2020-04-16 PROCEDURE — 700101 HCHG RX REV CODE 250: Performed by: EMERGENCY MEDICINE

## 2020-04-16 PROCEDURE — 83690 ASSAY OF LIPASE: CPT

## 2020-04-16 PROCEDURE — 80053 COMPREHEN METABOLIC PANEL: CPT | Mod: 91

## 2020-04-16 PROCEDURE — 80307 DRUG TEST PRSMV CHEM ANLYZR: CPT

## 2020-04-16 PROCEDURE — 96367 TX/PROPH/DG ADDL SEQ IV INF: CPT

## 2020-04-16 PROCEDURE — 80074 ACUTE HEPATITIS PANEL: CPT

## 2020-04-16 PROCEDURE — 99285 EMERGENCY DEPT VISIT HI MDM: CPT

## 2020-04-16 PROCEDURE — 81003 URINALYSIS AUTO W/O SCOPE: CPT

## 2020-04-16 PROCEDURE — 85610 PROTHROMBIN TIME: CPT

## 2020-04-16 PROCEDURE — 700111 HCHG RX REV CODE 636 W/ 250 OVERRIDE (IP): Performed by: EMERGENCY MEDICINE

## 2020-04-16 PROCEDURE — 82607 VITAMIN B-12: CPT

## 2020-04-16 PROCEDURE — 700105 HCHG RX REV CODE 258: Performed by: INTERNAL MEDICINE

## 2020-04-16 RX ORDER — DIPHENHYDRAMINE HCL 25 MG
25 TABLET ORAL NIGHTLY PRN
Status: DISCONTINUED | OUTPATIENT
Start: 2020-04-16 | End: 2020-04-17 | Stop reason: HOSPADM

## 2020-04-16 RX ORDER — HYDROCODONE BITARTRATE AND ACETAMINOPHEN 5; 325 MG/1; MG/1
1-2 TABLET ORAL EVERY 6 HOURS PRN
Status: ON HOLD | COMMUNITY
End: 2020-04-17

## 2020-04-16 RX ORDER — ONDANSETRON 4 MG/1
4 TABLET, ORALLY DISINTEGRATING ORAL EVERY 4 HOURS PRN
Status: DISCONTINUED | OUTPATIENT
Start: 2020-04-16 | End: 2020-04-17 | Stop reason: HOSPADM

## 2020-04-16 RX ORDER — TRAMADOL HYDROCHLORIDE 50 MG/1
50 TABLET ORAL ONCE
Status: COMPLETED | OUTPATIENT
Start: 2020-04-16 | End: 2020-04-16

## 2020-04-16 RX ORDER — BISACODYL 10 MG
10 SUPPOSITORY, RECTAL RECTAL
Status: DISCONTINUED | OUTPATIENT
Start: 2020-04-16 | End: 2020-04-17 | Stop reason: HOSPADM

## 2020-04-16 RX ORDER — SODIUM CHLORIDE 9 MG/ML
INJECTION, SOLUTION INTRAVENOUS CONTINUOUS
Status: DISCONTINUED | OUTPATIENT
Start: 2020-04-16 | End: 2020-04-17 | Stop reason: HOSPADM

## 2020-04-16 RX ORDER — POLYETHYLENE GLYCOL 3350 17 G/17G
1 POWDER, FOR SOLUTION ORAL
Status: DISCONTINUED | OUTPATIENT
Start: 2020-04-16 | End: 2020-04-17 | Stop reason: HOSPADM

## 2020-04-16 RX ORDER — DOCUSATE SODIUM 100 MG/1
100 CAPSULE, LIQUID FILLED ORAL 2 TIMES DAILY
COMMUNITY
End: 2020-12-07

## 2020-04-16 RX ORDER — AMOXICILLIN 250 MG
2 CAPSULE ORAL 2 TIMES DAILY
Status: DISCONTINUED | OUTPATIENT
Start: 2020-04-16 | End: 2020-04-17 | Stop reason: HOSPADM

## 2020-04-16 RX ORDER — ONDANSETRON 2 MG/ML
4 INJECTION INTRAMUSCULAR; INTRAVENOUS EVERY 4 HOURS PRN
Status: DISCONTINUED | OUTPATIENT
Start: 2020-04-16 | End: 2020-04-17 | Stop reason: HOSPADM

## 2020-04-16 RX ORDER — SODIUM CHLORIDE 9 MG/ML
1000 INJECTION, SOLUTION INTRAVENOUS ONCE
Status: COMPLETED | OUTPATIENT
Start: 2020-04-16 | End: 2020-04-16

## 2020-04-16 RX ADMIN — SODIUM CHLORIDE 1000 ML: 9 INJECTION, SOLUTION INTRAVENOUS at 10:33

## 2020-04-16 RX ADMIN — CEFTRIAXONE SODIUM 1 G: 1 INJECTION, POWDER, FOR SOLUTION INTRAMUSCULAR; INTRAVENOUS at 13:05

## 2020-04-16 RX ADMIN — METRONIDAZOLE 500 MG: 500 INJECTION, SOLUTION INTRAVENOUS at 13:45

## 2020-04-16 RX ADMIN — DIPHENHYDRAMINE HCL 25 MG: 25 TABLET ORAL at 20:37

## 2020-04-16 RX ADMIN — IOHEXOL 100 ML: 350 INJECTION, SOLUTION INTRAVENOUS at 11:07

## 2020-04-16 RX ADMIN — TRAMADOL HYDROCHLORIDE 50 MG: 50 TABLET, FILM COATED ORAL at 21:41

## 2020-04-16 RX ADMIN — SODIUM CHLORIDE: 9 INJECTION, SOLUTION INTRAVENOUS at 16:05

## 2020-04-16 RX ADMIN — METRONIDAZOLE 500 MG: 500 INJECTION, SOLUTION INTRAVENOUS at 21:41

## 2020-04-16 ASSESSMENT — LIFESTYLE VARIABLES
CONSUMPTION TOTAL: NEGATIVE
HOW MANY TIMES IN THE PAST YEAR HAVE YOU HAD 5 OR MORE DRINKS IN A DAY: 0
EVER_SMOKED: NEVER
TOTAL SCORE: 0
ON A TYPICAL DAY WHEN YOU DRINK ALCOHOL HOW MANY DRINKS DO YOU HAVE: 2
HAVE PEOPLE ANNOYED YOU BY CRITICIZING YOUR DRINKING: NO
TOTAL SCORE: 0
HAVE YOU EVER FELT YOU SHOULD CUT DOWN ON YOUR DRINKING: NO
ALCOHOL_USE: YES
TOTAL SCORE: 0
AVERAGE NUMBER OF DAYS PER WEEK YOU HAVE A DRINK CONTAINING ALCOHOL: 6
EVER FELT BAD OR GUILTY ABOUT YOUR DRINKING: NO
EVER HAD A DRINK FIRST THING IN THE MORNING TO STEADY YOUR NERVES TO GET RID OF A HANGOVER: NO

## 2020-04-16 ASSESSMENT — PATIENT HEALTH QUESTIONNAIRE - PHQ9
3. TROUBLE FALLING OR STAYING ASLEEP OR SLEEPING TOO MUCH: SEVERAL DAYS
7. TROUBLE CONCENTRATING ON THINGS, SUCH AS READING THE NEWSPAPER OR WATCHING TELEVISION: SEVERAL DAYS
2. FEELING DOWN, DEPRESSED, IRRITABLE, OR HOPELESS: SEVERAL DAYS
SUM OF ALL RESPONSES TO PHQ QUESTIONS 1-9: 6
SUM OF ALL RESPONSES TO PHQ9 QUESTIONS 1 AND 2: 2
9. THOUGHTS THAT YOU WOULD BE BETTER OFF DEAD, OR OF HURTING YOURSELF: NOT AT ALL
6. FEELING BAD ABOUT YOURSELF - OR THAT YOU ARE A FAILURE OR HAVE LET YOURSELF OR YOUR FAMILY DOWN: NOT AL ALL
5. POOR APPETITE OR OVEREATING: SEVERAL DAYS
1. LITTLE INTEREST OR PLEASURE IN DOING THINGS: SEVERAL DAYS
8. MOVING OR SPEAKING SO SLOWLY THAT OTHER PEOPLE COULD HAVE NOTICED. OR THE OPPOSITE, BEING SO FIGETY OR RESTLESS THAT YOU HAVE BEEN MOVING AROUND A LOT MORE THAN USUAL: NOT AT ALL
4. FEELING TIRED OR HAVING LITTLE ENERGY: SEVERAL DAYS

## 2020-04-16 ASSESSMENT — ENCOUNTER SYMPTOMS
NERVOUS/ANXIOUS: 0
HEADACHES: 0
ROS SKIN COMMENTS: JAUNDICE
VOMITING: 0
DIZZINESS: 1
CHILLS: 1
NAUSEA: 1
COUGH: 0
PALPITATIONS: 0
CONSTIPATION: 1
SHORTNESS OF BREATH: 0
EYE DISCHARGE: 0
WEAKNESS: 1
EYE REDNESS: 0

## 2020-04-16 ASSESSMENT — COGNITIVE AND FUNCTIONAL STATUS - GENERAL
MOBILITY SCORE: 21
WALKING IN HOSPITAL ROOM: A LITTLE
TOILETING: A LITTLE
STANDING UP FROM CHAIR USING ARMS: A LITTLE
SUGGESTED CMS G CODE MODIFIER MOBILITY: CJ
DAILY ACTIVITIY SCORE: 23
SUGGESTED CMS G CODE MODIFIER DAILY ACTIVITY: CI
CLIMB 3 TO 5 STEPS WITH RAILING: A LITTLE

## 2020-04-16 ASSESSMENT — FIBROSIS 4 INDEX
FIB4 SCORE: 1.34
FIB4 SCORE: 4.6

## 2020-04-16 NOTE — PROGRESS NOTES
2RN skin check:    Skin WDL except for yellow discoloration of skin on upper extremities, face, back of head, lower extremities, and trunk. Scars on each knee bilaterally, and on left shoulder. 2 dressings on left ankle, clean dry and intact with some old drainage. Site unable to assess.

## 2020-04-16 NOTE — ED NOTES
Med rec complete per patient over the phone  Allergies reviewed    Patient completed Bactrim back on 4/2/2020    Hasn't taken most medications since surgery a week ago

## 2020-04-16 NOTE — ED TRIAGE NOTES
"Rob Clifton 69 y.o. male   Chief Complaint   Patient presents with   • Post-Op Complications     Pt had surgery 1 week ago on left foot wound; pt reports he went to wound care and told to follow up d/t pt is jaundiced, having no appetite; dark urine; pt was taking norco and bactrim but stopped within the last few days    • Jaundice     BP (!) 98/66   Pulse 73   Temp 36.4 °C (97.6 °F) (Temporal)   Resp 18   Ht 1.803 m (5' 11\")   Wt 89.5 kg (197 lb 5 oz)   SpO2 97%   BMI 27.52 kg/m²     Pt reports just generally feeling unwell since surgery. Weak, lack of appetite, dark urine, feeling very dehydrated and \"dry\", and pt is noticeably jaundiced in triage.  Pt returned to lobby and educated on triage process. Advised to notify RN with changes or concerns.   Pt denies any fever, cough, respiratory symptoms, travel or exposure to anyone with COVID.  "

## 2020-04-16 NOTE — ASSESSMENT & PLAN NOTE
CT findings suggestive of cholecystitis however he has no leukocytosis or fever or tenderness  Check HIDA, MRCP  Check acute hepatitis serologies  Contact isolation for now

## 2020-04-16 NOTE — PROGRESS NOTES
Wound care is aware that there is an order to address the patients wound on his achilles and change the dressing. They are familiar with the patient. They will most likely come tomorrow morning.

## 2020-04-16 NOTE — PROGRESS NOTES
Spoke to Bernardino from [facility]. Hida scan cannot be done until total bilirubin below 5.

## 2020-04-16 NOTE — ASSESSMENT & PLAN NOTE
Patient does not have B12 supplementation on his medication list. Took only for a few months is extremely anxious and started to go stir crazy. Advised needs life long supplements  Recheck level

## 2020-04-16 NOTE — PROGRESS NOTES
Patient brought to unit by transport. VSS. No pain. Flagyl running. No further needs at this time.

## 2020-04-16 NOTE — ED NOTES
Assumed care of pt upon return from ct-vs as charted, call light in reach, lemon swabs provided for c/o dry mouth. Denies further needs.

## 2020-04-16 NOTE — ED NOTES
Unable to complete med rec, called patient and left message for return call to review medications

## 2020-04-16 NOTE — PROGRESS NOTES
Charge nurse note:  Per Dr. Valdivia patient cannot share a restroom with another patient however he does not need contact isolation. This will be until we get results of his hepatitis panel.

## 2020-04-16 NOTE — CARE PLAN
Problem: Safety  Goal: Will remain free from injury  4/16/2020 1637 by Irma Ng R.N.  Outcome: PROGRESSING AS EXPECTED  Note: Patient educated about using call light for assistance due to having IV tubing. Patient wearing non skid socks.     Problem: Infection  Goal: Will remain free from infection  Outcome: PROGRESSING AS EXPECTED  Note: Will monitor patients incision on achilles tendon. Wound care consult put in.     Problem: Venous Thromboembolism (VTW)/Deep Vein Thrombosis (DVT) Prevention:  Goal: Patient will participate in Venous Thrombosis (VTE)/Deep Vein Thrombosis (DVT)Prevention Measures  Outcome: PROGRESSING AS EXPECTED  Intervention: Ensure patient wears graduated elastic stockings (NADIYA hose) and/or SCDs, if ordered, when in bed or chair (Remove at least once per shift for skin check)  Note: SCDs on and educated about.     Problem: Bowel/Gastric:  Goal: Normal bowel function is maintained or improved  Outcome: PROGRESSING AS EXPECTED  Note: Monitored bowel sounds. Advancing diet as tolerated.     Problem: Psychosocial Needs:  Goal: Level of anxiety will decrease  Outcome: PROGRESSING AS EXPECTED  Intervention: Identify and develop with patient strategies to cope with anxiety triggers  Note: Spoke to patient in a therapeutic manner.

## 2020-04-16 NOTE — ASSESSMENT & PLAN NOTE
Polymicrobial infection status post surgery approximately 1 week ago  Followed in wound clinic  Completed course of Bactrim-turns were positive for Pasteurella, strep viridans, Bacteroides.
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
## 2020-04-17 VITALS
OXYGEN SATURATION: 94 % | DIASTOLIC BLOOD PRESSURE: 59 MMHG | RESPIRATION RATE: 16 BRPM | HEIGHT: 71 IN | SYSTOLIC BLOOD PRESSURE: 101 MMHG | BODY MASS INDEX: 27.75 KG/M2 | HEART RATE: 63 BPM | TEMPERATURE: 98.1 F | WEIGHT: 198.19 LBS

## 2020-04-17 LAB
ALBUMIN SERPL BCP-MCNC: 3.1 G/DL (ref 3.2–4.9)
ALBUMIN/GLOB SERPL: 1.2 G/DL
ALP SERPL-CCNC: 606 U/L (ref 30–99)
ALT SERPL-CCNC: 307 U/L (ref 2–50)
ANION GAP SERPL CALC-SCNC: 10 MMOL/L (ref 7–16)
AST SERPL-CCNC: 230 U/L (ref 12–45)
BILIRUB SERPL-MCNC: 9.2 MG/DL (ref 0.1–1.5)
BUN SERPL-MCNC: 16 MG/DL (ref 8–22)
CALCIUM SERPL-MCNC: 8 MG/DL (ref 8.4–10.2)
CHLORIDE SERPL-SCNC: 100 MMOL/L (ref 96–112)
CO2 SERPL-SCNC: 22 MMOL/L (ref 20–33)
CREAT SERPL-MCNC: 0.69 MG/DL (ref 0.5–1.4)
FERRITIN SERPL-MCNC: 1986 NG/ML (ref 22–322)
GLOBULIN SER CALC-MCNC: 2.6 G/DL (ref 1.9–3.5)
GLUCOSE SERPL-MCNC: 100 MG/DL (ref 65–99)
HIV 1+2 AB+HIV1 P24 AG SERPL QL IA: NORMAL
LIPASE SERPL-CCNC: 51 U/L (ref 7–58)
POTASSIUM SERPL-SCNC: 4.3 MMOL/L (ref 3.6–5.5)
PROT SERPL-MCNC: 5.7 G/DL (ref 6–8.2)
SODIUM SERPL-SCNC: 132 MMOL/L (ref 135–145)
TSH SERPL DL<=0.005 MIU/L-ACNC: 2.66 UIU/ML (ref 0.38–5.33)

## 2020-04-17 PROCEDURE — 700105 HCHG RX REV CODE 258: Performed by: INTERNAL MEDICINE

## 2020-04-17 PROCEDURE — 700102 HCHG RX REV CODE 250 W/ 637 OVERRIDE(OP): Performed by: INTERNAL MEDICINE

## 2020-04-17 PROCEDURE — 82390 ASSAY OF CERULOPLASMIN: CPT

## 2020-04-17 PROCEDURE — 700111 HCHG RX REV CODE 636 W/ 250 OVERRIDE (IP): Performed by: INTERNAL MEDICINE

## 2020-04-17 PROCEDURE — A9270 NON-COVERED ITEM OR SERVICE: HCPCS | Performed by: INTERNAL MEDICINE

## 2020-04-17 PROCEDURE — 99217 PR OBSERVATION CARE DISCHARGE: CPT | Performed by: HOSPITALIST

## 2020-04-17 PROCEDURE — G0378 HOSPITAL OBSERVATION PER HR: HCPCS

## 2020-04-17 PROCEDURE — 82728 ASSAY OF FERRITIN: CPT

## 2020-04-17 PROCEDURE — 96367 TX/PROPH/DG ADDL SEQ IV INF: CPT

## 2020-04-17 PROCEDURE — 86038 ANTINUCLEAR ANTIBODIES: CPT

## 2020-04-17 PROCEDURE — 97161 PT EVAL LOW COMPLEX 20 MIN: CPT

## 2020-04-17 PROCEDURE — 83690 ASSAY OF LIPASE: CPT

## 2020-04-17 PROCEDURE — 83516 IMMUNOASSAY NONANTIBODY: CPT

## 2020-04-17 PROCEDURE — 96366 THER/PROPH/DIAG IV INF ADDON: CPT

## 2020-04-17 PROCEDURE — 80053 COMPREHEN METABOLIC PANEL: CPT

## 2020-04-17 PROCEDURE — 87389 HIV-1 AG W/HIV-1&-2 AB AG IA: CPT

## 2020-04-17 PROCEDURE — 82103 ALPHA-1-ANTITRYPSIN TOTAL: CPT

## 2020-04-17 PROCEDURE — 700101 HCHG RX REV CODE 250: Performed by: INTERNAL MEDICINE

## 2020-04-17 PROCEDURE — 84443 ASSAY THYROID STIM HORMONE: CPT

## 2020-04-17 PROCEDURE — 36415 COLL VENOUS BLD VENIPUNCTURE: CPT

## 2020-04-17 RX ORDER — OXYCODONE HYDROCHLORIDE 5 MG/1
5 TABLET ORAL EVERY 4 HOURS PRN
Status: DISCONTINUED | OUTPATIENT
Start: 2020-04-17 | End: 2020-04-17 | Stop reason: HOSPADM

## 2020-04-17 RX ORDER — HYDROXYZINE PAMOATE 50 MG/1
50 CAPSULE ORAL EVERY 6 HOURS PRN
Qty: 90 CAP | Refills: 0 | Status: SHIPPED | OUTPATIENT
Start: 2020-04-17 | End: 2020-12-07

## 2020-04-17 RX ORDER — OXYCODONE HYDROCHLORIDE 5 MG/1
5 TABLET ORAL EVERY 8 HOURS PRN
Qty: 10 TAB | Refills: 0 | Status: SHIPPED | OUTPATIENT
Start: 2020-04-17 | End: 2020-04-20

## 2020-04-17 RX ADMIN — METRONIDAZOLE 500 MG: 500 INJECTION, SOLUTION INTRAVENOUS at 06:18

## 2020-04-17 RX ADMIN — ONDANSETRON 4 MG: 4 TABLET, ORALLY DISINTEGRATING ORAL at 08:56

## 2020-04-17 RX ADMIN — OXYCODONE HYDROCHLORIDE 5 MG: 5 TABLET ORAL at 07:54

## 2020-04-17 RX ADMIN — CEFTRIAXONE SODIUM 2 G: 2 INJECTION, POWDER, FOR SOLUTION INTRAMUSCULAR; INTRAVENOUS at 05:25

## 2020-04-17 ASSESSMENT — ENCOUNTER SYMPTOMS: ABDOMINAL PAIN: 0

## 2020-04-17 NOTE — PROGRESS NOTES
Received report from night shift. Assumed care of patient. Patient currently NPO. Patient complaining of headache and is requesting pain medication. Called [facility] if patient will be having HIDA scan today and they said no due to patients bilirubin still being above 5. Will follow up with  If patient should be NPO. Will continue to monitor patient.

## 2020-04-17 NOTE — PROGRESS NOTES
Surgical Progress Note    Author: Guillermo Dyer M.D. Date & Time created: 2020   8:34 AM     Interval Events:  Headache  No abdominal pain    Review of Systems   Gastrointestinal: Negative for abdominal pain.     Hemodynamics:  Temp (24hrs), Av.7 °C (98 °F), Min:36.4 °C (97.6 °F), Max:36.8 °C (98.3 °F)  Temperature: 36.8 °C (98.3 °F)  Pulse  Av.5  Min: 63  Max: 74   Blood Pressure : 119/74     Respiratory:    Respiration: 20, Pulse Oximetry: 95 %           Neuro:  GCS       Fluids:    Intake/Output Summary (Last 24 hours) at 2020 0834  Last data filed at 2020 0700  Gross per 24 hour   Intake 100 ml   Output 900 ml   Net -800 ml     Weight: 89.9 kg (198 lb 3.1 oz)  Current Diet Order   Procedures   • Diet Order Regular     Physical Exam  Labs:  Recent Results (from the past 24 hour(s))   CBC WITH DIFFERENTIAL    Collection Time: 20 10:08 AM   Result Value Ref Range    WBC 6.9 4.8 - 10.8 K/uL    RBC 4.76 4.70 - 6.10 M/uL    Hemoglobin 14.1 14.0 - 18.0 g/dL    Hematocrit 40.5 (L) 42.0 - 52.0 %    MCV 85.1 81.4 - 97.8 fL    MCH 29.6 27.0 - 33.0 pg    MCHC 34.8 33.7 - 35.3 g/dL    RDW 38.5 35.9 - 50.0 fL    Platelet Count 223 164 - 446 K/uL    MPV 9.3 9.0 - 12.9 fL    Neutrophils-Polys 77.80 (H) 44.00 - 72.00 %    Lymphocytes 10.90 (L) 22.00 - 41.00 %    Monocytes 9.40 0.00 - 13.40 %    Eosinophils 0.90 0.00 - 6.90 %    Basophils 0.60 0.00 - 1.80 %    Immature Granulocytes 0.40 0.00 - 0.90 %    Nucleated RBC 0.00 /100 WBC    Neutrophils (Absolute) 5.37 1.82 - 7.42 K/uL    Lymphs (Absolute) 0.75 (L) 1.00 - 4.80 K/uL    Monos (Absolute) 0.65 0.00 - 0.85 K/uL    Eos (Absolute) 0.06 0.00 - 0.51 K/uL    Baso (Absolute) 0.04 0.00 - 0.12 K/uL    Immature Granulocytes (abs) 0.03 0.00 - 0.11 K/uL    NRBC (Absolute) 0.00 K/uL   COMP METABOLIC PANEL    Collection Time: 20 10:08 AM   Result Value Ref Range    Sodium 130 (L) 135 - 145 mmol/L    Potassium 4.1 3.6 - 5.5 mmol/L    Chloride 95 (L) 96  - 112 mmol/L    Co2 21 20 - 33 mmol/L    Anion Gap 14.0 7.0 - 16.0    Glucose 119 (H) 65 - 99 mg/dL    Bun 19 8 - 22 mg/dL    Creatinine 0.78 0.50 - 1.40 mg/dL    Calcium 8.8 8.4 - 10.2 mg/dL    AST(SGOT) 293 (H) 12 - 45 U/L    ALT(SGPT) 367 (H) 2 - 50 U/L    Alkaline Phosphatase 623 (H) 30 - 99 U/L    Total Bilirubin 9.7 (H) 0.1 - 1.5 mg/dL    Albumin 3.6 3.2 - 4.9 g/dL    Total Protein 6.5 6.0 - 8.2 g/dL    Globulin 2.9 1.9 - 3.5 g/dL    A-G Ratio 1.2 g/dL   LIPASE    Collection Time: 04/16/20 10:08 AM   Result Value Ref Range    Lipase 53 7 - 58 U/L   BILIRUBIN DIRECT    Collection Time: 04/16/20 10:08 AM   Result Value Ref Range    Direct Bilirubin 8.1 (H) 0.1 - 0.5 mg/dL   BILIRUBIN INDIRECT    Collection Time: 04/16/20 10:08 AM   Result Value Ref Range    Indirect Bilirubin 1.6 (H) 0.0 - 1.0 mg/dL   ESTIMATED GFR    Collection Time: 04/16/20 10:08 AM   Result Value Ref Range    GFR If African American >60 >60 mL/min/1.73 m 2    GFR If Non African American >60 >60 mL/min/1.73 m 2   Prothrombin Time    Collection Time: 04/16/20 10:08 AM   Result Value Ref Range    PT 11.8 (L) 12.0 - 14.6 sec    INR 0.86 (L) 0.87 - 1.13   URINALYSIS (UA)    Collection Time: 04/16/20 10:36 AM   Result Value Ref Range    Color Orange     Character Clear     Specific Gravity 1.022 <1.035    Ph 5.0 5.0 - 8.0    Glucose Negative Negative mg/dL    Ketones 40 (A) Negative mg/dL    Protein Negative Negative mg/dL    Bilirubin Large (A) Negative    Nitrite Negative Negative    Leukocyte Esterase Negative Negative    Occult Blood Negative Negative    Micro Urine Req see below    ACETAMINOPHEN    Collection Time: 04/16/20 10:36 AM   Result Value Ref Range    Acetaminophen -Tylenol <5 (L) 10 - 30 ug/mL   HEPATITIS PANEL ACUTE(4 COMPONENTS)    Collection Time: 04/16/20  5:20 PM   Result Value Ref Range    Hepatitis B Surface Antigen Non-Reactive Non-Reactive    Hepatitis B Cors Ab,IgM Non-Reactive Non-Reactive    Hepatitis A Virus Ab, IgM  Non-Reactive Non-Reactive    Hepatitis C Antibody Non-Reactive Non-Reactive   VITAMIN B12    Collection Time: 04/16/20  5:20 PM   Result Value Ref Range    Vitamin B12 -True Cobalamin 1615 (H) 211 - 911 pg/mL   Comp Metabolic Panel (CMP)    Collection Time: 04/17/20  4:14 AM   Result Value Ref Range    Sodium 132 (L) 135 - 145 mmol/L    Potassium 4.3 3.6 - 5.5 mmol/L    Chloride 100 96 - 112 mmol/L    Co2 22 20 - 33 mmol/L    Anion Gap 10.0 7.0 - 16.0    Glucose 100 (H) 65 - 99 mg/dL    Bun 16 8 - 22 mg/dL    Creatinine 0.69 0.50 - 1.40 mg/dL    Calcium 8.0 (L) 8.4 - 10.2 mg/dL    AST(SGOT) 230 (H) 12 - 45 U/L    ALT(SGPT) 307 (H) 2 - 50 U/L    Alkaline Phosphatase 606 (H) 30 - 99 U/L    Total Bilirubin 9.2 (H) 0.1 - 1.5 mg/dL    Albumin 3.1 (L) 3.2 - 4.9 g/dL    Total Protein 5.7 (L) 6.0 - 8.2 g/dL    Globulin 2.6 1.9 - 3.5 g/dL    A-G Ratio 1.2 g/dL   ESTIMATED GFR    Collection Time: 04/17/20  4:14 AM   Result Value Ref Range    GFR If African American >60 >60 mL/min/1.73 m 2    GFR If Non African American >60 >60 mL/min/1.73 m 2     Medical Decision Making, by Problem:  Active Hospital Problems    Diagnosis   • Wound of left ankle [S91.002A]   • LFT elevation [R79.89]   • Hyponatremia [E87.1]   • Murmur [R01.1]   • Vitamin B12 deficiency [E53.8]     Plan:  No duct obstruction  Clinically highly unlikely to have cholecystitis  ? Hepatitis  Recommend GI evaluation    Quality Measures:  Quality-Core Measures    Discussed patient condition with Hospitalist, RN and Patient
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
## 2020-04-17 NOTE — PROGRESS NOTES
Patient discharged. Discharge paperwork and prescriptions were received by patient. IV was removed. Patient was discharged on a wheelchair and escorted by the RN and picked up by wife Haris. Patient wore their boot. No further needs at this time.

## 2020-04-17 NOTE — CARE PLAN
Problem: Safety  Goal: Will remain free from injury  Outcome: PROGRESSING AS EXPECTED  Note: Educated patient about calling for assistance with ambulating due to having IV lines and SCDs. Call light within reach.     Problem: Infection  Goal: Will remain free from infection  Outcome: PROGRESSING AS EXPECTED  Note: Wound care came to assess wound on ankle and changed the dressing. Will assess vital signs for infection. VSS.     Problem: Venous Thromboembolism (VTW)/Deep Vein Thrombosis (DVT) Prevention:  Goal: Patient will participate in Venous Thrombosis (VTE)/Deep Vein Thrombosis (DVT)Prevention Measures  Outcome: PROGRESSING AS EXPECTED  Intervention: Ensure patient wears graduated elastic stockings (NADIYA hose) and/or SCDs, if ordered, when in bed or chair (Remove at least once per shift for skin check)  Note: Patient occasionally ambulatory.     Problem: Bowel/Gastric:  Goal: Normal bowel function is maintained or improved  Outcome: PROGRESSING AS EXPECTED  Note: Provided zofran for nausea. Will continue to monitor.     Problem: Pain Management  Goal: Pain level will decrease to patient's comfort goal  Outcome: PROGRESSING AS EXPECTED  Intervention: Educate and implement non-pharmacologic comfort measures. Examples: relaxation, distration, play therapy, activity therapy, massage, etc.  Note: Encouraged increase in head of bed to decrease headache. Pain medication provided.     Problem: Psychosocial Needs:  Goal: Level of anxiety will decrease  Outcome: PROGRESSING AS EXPECTED  Note: Notified patient about the POC for the day.

## 2020-04-17 NOTE — OP REPORT
DATE OF SERVICE:  04/09/2020    PREOPERATIVE DIAGNOSES:  1.  Left posterior leg ulceration.  2.  Left chronic Achilles tendon rupture.    POSTOPERATIVE DIAGNOSES:  1.  Left posterior leg ulceration.  2.  Left chronic Achilles tendon rupture.    PROCEDURES:  1.  Left Achilles extensive debridement.  2.  Left secondary Achilles repair.  3.  Left complicated closure of wound dehiscence.  4.  Left ACell membrane placement.  5.  Left incisional VAC placement.    SURGEON:  Kal Huynh MD    ANESTHESIA:  General.    ESTIMATED BLOOD LOSS:  25 mL.    TOURNIQUET TIME:  22 minutes at 250 mmHg.    FINDINGS:  Full-thickness ulceration posteriorly to the Achilles tendon,   marked thickening and adherence of the tendon with greater than 50% of the   tendon with degenerative tissue.  No signs or symptoms of deep infection.    IMPLANTS:  Cytal wound matrix 2-layer, 5x5 cm.    COMPLICATIONS:  None.    OUTCOME:  PACU in stable condition.    HISTORY OF PRESENT ILLNESS:  This is a very pleasant gentleman who had a   chronic wound breakdown over his Achilles.  He was referred to me by vascular   surgery, who felt he had adequate blood flow for repair.  We discussed   operative and nonoperative management and elected to proceed with surgical   management.  He was greeted in the preoperative holding area, identified by   name and medical record number.  Left lower extremity was marked.  Risks of   procedure including bleeding, infection, pain, wound breakdown, neurovascular   damage, need for more surgery were discussed, he provided written consent.    DESCRIPTION OF PROCEDURE:  He was taken to the operating room, where general   anesthesia was induced on the gurney.  Preoperative antibiotics were   administered.  He was placed prone on the operating room table with all bony   prominences well padded.  Left lower extremity prepped and draped with a   Betadine prep and paint.  An operative pause was undertaken, where all present    were in agreement with patient identification, laterality, and procedure to   be performed.  Limb was elevated for 5 minutes and tourniquet raised to 250   mmHg.    Achilles extensive debridement and repair with irrigation and debridement of   posterior leg:  We freshened the skin edges.  We meticulously began    the tendon that had been adherent to surrounding tissue, sharply excised a   significant amount of diseased Achilles tendon tissue with forceps and   scalpel.  We markedly improved its excursion to the point where we had   excursion and Lewis test was negative.  The tourniquet was let down.  I   felt that there was adequate blood flow to skin edges.  I circumferentially   dissected out the Achilles, did not find any gross purulence.  We did send   tissue for culture.  We then irrigated 3 L of sterile normal saline through   the wound using a curette to excise additional nonviable tissue.  I placed the   Cytal sheet along the tendon circumferentially beneath the wound.  We closed   the subcutaneous layer with buried interrupted 2-0 PDS suture.  The wound was   then closed meticulously with adequate trimming of skin edges with interrupted   2-0 nylon suture in horizontal mattress fashion.    Incisional wound VAC placement:  We windowed out the incision edges with Ioban   tape.  We placed a wound VAC that held excellent suction.  This was then   wrapped with a Sof-Rol and an Ace.  The patient was awakened and extubated in   stable condition.    POSTOPERATIVE COURSE:  He will be discharged home today assuming adequate pain   control and mobilization, weightbearing as tolerated left lower extremity in   a Cam walker boot.  He will continue to follow in wound care.  They will keep   me updated on his progress.  Stitches should remain in place for 3-4 weeks   depending on healing.       ____________________________________     MD JUAN CALHOUN / MARK    DD:  04/17/2020 08:09:34  DT:   04/17/2020 08:51:46    D#:  [id number]  Job#:  [id number]

## 2020-04-17 NOTE — WOUND TEAM
[facility]  Inpatient Services  Initial Wound and Skin Care Evaluation    Admission Date: 4/16/2020     Last order of IP CONSULT TO WOUND CARE was found on 4/16/2020 from Hospital Encounter on 4/16/2020       HPI, PMH, SH: Reviewed    Unit where seen by Wound Team: 2203/01     WOUND CONSULT RELATED TO:  Left achilles    Self Report / Pain Level: none       OBJECTIVE:  Pt with adhesive foam and petrolatum dresisng with tubigrip on left .    WOUND TYPE, LOCATION, CHARACTERISTICS (Pressure Injuries: location, stage, POA or date identified)  Wound 03/23/20 Full Thickness Wound Left Posterior LLE achilles (Active)   Number of days: 25       Wound 04/16/20 Incision Ankle Left Achilles (Active)   Site Assessment Red;Yellow 4/17/2020  7:00 AM   Periwound Assessment Edema 4/17/2020  7:00 AM   Margins Undefined edges 4/17/2020  7:00 AM   Closure Surface sutures 4/17/2020  7:00 AM   Drainage Amount Small 4/17/2020  7:00 AM   Drainage Description Serosanguineous 4/17/2020  7:00 AM   Treatments Site care;Cleansed 4/17/2020  7:00 AM   Wound Cleansing Normal Saline Irrigation 4/17/2020  7:00 AM   Periwound Protectant Skin Protectant Wipes to Periwound 4/17/2020  7:00 AM   Dressing Options Hydrofiber Silver;Silicone Adhesive Foam;Tubigrip 4/17/2020  7:00 AM   Dressing Changed Changed 4/17/2020  7:00 AM   Dressing Status Clean;Dry;Intact 4/16/2020  8:47 PM   Dressing Change/Treatment Frequency Every 48 hrs, and As Needed 4/17/2020  7:00 AM   NEXT Dressing Change/Treatment Date 04/19/20 4/17/2020  7:00 AM   NEXT Weekly Photo (Inpatient Only) 04/24/20 4/17/2020  7:00 AM   Shape linear 4/17/2020  7:00 AM   Wound Odor None 4/17/2020  7:00 AM   Exposed Structures None 4/17/2020  7:00 AM   WOUND NURSE ONLY - Time Spent with Patient (mins) 60 4/17/2020  7:00 AM   Number of days: 1          Vascular:   3/24/2020 in Dr Pena's office with the following results - Pulses:  triphasic flow to bilateral DP/PT arteries. ABIs  left 1.33, Right 1.3  FRANCES:   No results found.      Lab Values:    Lab Results   Component Value Date/Time    WBC 6.9 04/16/2020 10:08 AM    RBC 4.76 04/16/2020 10:08 AM    HEMOGLOBIN 14.1 04/16/2020 10:08 AM    HEMATOCRIT 40.5 (L) 04/16/2020 10:08 AM    CREACTPROT 0.10 09/16/2015 03:46 PM    SEDRATEWES 8 09/16/2015 03:46 PM          Culture:   Obtained - nt  Culture Results show:  Recent Results (from the past 720 hour(s))   CULTURE WOUND W/ GRAM STAIN    Collection Time: 03/23/20  9:45 AM   Result Value Ref Range    Significant Indicator POS (POS)     Source WND     Site LEFT LEG     Culture Result Moderate growth mixed skin evy. (A)     Gram Stain Result Moderate Gram positive cocci.     Culture Result Staphylococcus aureus  Moderate growth   (A)        Susceptibility    Staphylococcus aureus - GABRIELA     Azithromycin <=2 Sensitive mcg/mL     Clindamycin <=0.5 Sensitive mcg/mL     Cefazolin <=8 Sensitive mcg/mL     Ceftaroline <=0.5 Sensitive mcg/mL     Daptomycin <=1 Sensitive mcg/mL     Ampicillin/sulbactam <=8/4 Sensitive mcg/mL     Erythromycin <=0.25 Sensitive mcg/mL     Vancomycin 1 Sensitive mcg/mL     Oxacillin 0.5 Sensitive mcg/mL     Pip/Tazobactam <=4 Sensitive mcg/mL     Trimeth/Sulfa <=0.5/9.5 Sensitive mcg/mL     Tetracycline <=4 Sensitive mcg/mL         INTERVENTIONS BY WOUND TEAM: dressing removed, wound cleansed with saline and guaze, placed hydrofiber silver over incision and covered with adhesive foam.  Placed tubi E on left leg    Interdisciplinary consultation: Patient, Bedside RN     EVALUATION: Pt with a partially dehisced wound left achilles.  Pt was followed at [facility] wound clinic.  Pt had an I&D with [employer], wound closure and wound VAC with Dr Huynh on 4/9/2020.  Pt has remote hx of ankle sx following a fibula fracture.  A wound developed without known cause in the area recently,      Goals: Steady decrease in wound area and depth weekly.    NURSING PLAN OF CARE ORDERS (X):    Dressing  changes: See Dressing Care orders: X  Skin care: See Skin Care orders:   Rectal tube care: See Rectal Tube Care orders:   Other orders:    RSKIN:   CURRENTLY IN PLACE (X), APPLIED THIS VISIT (A), ORDERED (O):   Q shift Qamar:    Q shift pressure point assessments:    Pressure redistribution mattress            Low Airloss          Bariatric SERA         Bariatric foam           Heel float boots     Heel Silicone dressing        Float Heels off Bed with Pillows               Barrier wipes         Barrier Cream         Barrier paste          Sacral silicone dressing         Silicone O2 tubing         Anchorfast         Cannula fixation Device (Tender )          Gray Foam Ear protectors           Trach with Optifoam split foam                 Waffle cushion        Waffle Overlay         Rectal tube or BMS    Purwick/Condom Cath          Antifungal tx      Interdry          Reposition q 2 hours        Up to chair      X  Ambulate    With boot  PT/OT        Dietician        Diabetes Education      PO     TF     TPN     NPO   # days   Other        WOUND TEAM PLAN OF CARE   Dressing changes by wound team:          Follow up 1-2 times weekly:               Follow up 3 times weekly:                NPWT change 3 times weekly:     Follow up as needed:     X  Other (explain):     Anticipated discharge plans:  LTACH:        SNF/Rehab:                  Home Care:           Outpatient Wound Center:    X pt will require follow for left achilles dehisced surgical site       Self Care:

## 2020-04-17 NOTE — DISCHARGE SUMMARY
Discharge Summary    CHIEF COMPLAINT ON ADMISSION  Chief Complaint   Patient presents with   • Post-Op Complications     Pt had surgery 1 week ago on left foot wound; pt reports he went to wound care and told to follow up d/t pt is jaundiced, having no appetite; dark urine; pt was taking norco and bactrim but stopped within the last few days    • Jaundice       Reason for Admission  Post Op Comp     Admission Date  4/16/2020    CODE STATUS  Full Code    HPI & HOSPITAL COURSE  This is a 69 y.o. male here with significant painless jaundice, thought to be related to recent Bactrim use.  He was placed on Bactrim due to left lower extremity foot wound, patient had been taking a couple weeks worth of Bactrim and recently stopped last week, then noticing worsening jaundice approximately 2 to 3 days prior to admission.  Initial labs demonstrated sodium of 129,  and ALT of 386.  Alkaline phosphatase elevated at 634.  Total bilirubin on admission 9.5.  These have remained relatively stable throughout hospitalization, total bilirubin 9.2 on discharge.  Lipase normal at 51.  Tylenol level is undetectable.      Given concern for bile duct stone, he had a MRCP which showed:    1.  There is a contracted gallbladder with pericholecystic fluid and inflammatory change in the reginaldo hepatis. Acute cholecystitis is possible versus reactive changes related to hepatitis.  2.  There is no biliary dilatation or choledocholithiasis.  3.  There is a simple right renal cortical cyst. No follow up imaging is recommended per consensus guidelines of the 2019 ACR Incidental Findings Committee for probably benign incidental simple appearing renal cystic lesion(s) based on imaging criteria.    Surgery was contacted in consultation and Dr. Dyer saw patient and we discussed via telephone that is unlikely patient has any ongoing infection or cholecystitis.  Antibiotics were stopped.  Patient did not have any fever chills and no leukocytosis  during hospitalization.    I discussed this case with Dr. Denney of gastroenterology, most likely etiology for cholestatic jaundice is his recent Bactrim use, patient does not have any family history of liver disease, no significant alcohol or drug abuse, no excessive Tylenol use.  Patient has additional serologies and other laboratories pending at this time, drawn prior to discharge.  He has a standing order for repeat CMP next Monday and gastroenterology clinical follow-up with patient for close monitoring as an outpatient.  Hydroxyzine has been provided for itching.       Therefore, he is discharged in fair and stable condition to home with close outpatient follow-up.    Discharge Date  4/17/2020    FOLLOW UP ITEMS POST DISCHARGE  Continue hydroxyzine as needed  CMP next Monday  Follow-up with GI outpatient    DISCHARGE DIAGNOSES  Principal Problem:    LFT elevation POA: Unknown  Active Problems:    Vitamin B12 deficiency POA: Yes    Wound of left ankle POA: Unknown    Hyponatremia POA: Unknown    Murmur POA: Unknown  Resolved Problems:    * No resolved hospital problems. *      FOLLOW UP  Future Appointments   Date Time Provider Department Center   12/7/2020  8:20 AM Renita Guerra M.D. [facility] Atif Denney M.D.  [address] [place] [id number]  [phone]    Call  Please contact next week if you do not hear from them      MEDICATIONS ON DISCHARGE     Medication List      START taking these medications      Instructions   oxyCODONE immediate-release 5 MG Tabs  Commonly known as:  ROXICODONE   Take 1 Tab by mouth every 8 hours as needed for Severe Pain (Headaches) for up to 3 days.  Dose:  5 mg        CHANGE how you take these medications      Instructions   hydrOXYzine pamoate 50 MG Caps  What changed:  reasons to take this  Commonly known as:  VISTARIL   Take 1 Cap by mouth every 6 hours as needed for Itching or Anxiety.  Dose:  50 mg        CONTINUE taking these medications      Instructions    Aleve 220 MG Caps  Generic drug:  Naproxen Sodium   Take 2 Caps by mouth every day.  Dose:  2 Cap     docusate sodium 100 MG Caps  Commonly known as:  COLACE   Take 100 mg by mouth 2 times a day.  Dose:  100 mg     Fish Oil 1200 MG Caps   Take 3 Caps by mouth every day.  Dose:  3 Cap     LUTEIN 20 PO   Take 20 mg by mouth every day.  Dose:  20 mg     sildenafil citrate 100 MG tablet  Commonly known as:  VIAGRA   Take 1 Tab by mouth as needed for Erectile Dysfunction.  Dose:  100 mg        STOP taking these medications    HYDROcodone-acetaminophen 5-325 MG Tabs per tablet  Commonly known as:  NORCO     sulfamethoxazole-trimethoprim 800-160 MG tablet  Commonly known as:  BACTRIM DS            Allergies  No Known Allergies    DIET  Orders Placed This Encounter   Procedures   • Diet Order Regular     Standing Status:   Standing     Number of Occurrences:   1     Order Specific Question:   Diet:     Answer:   Regular [1]       ACTIVITY  As tolerated.  Weight bearing as tolerated    CONSULTATIONS  Surgery  Gastroenterology    PROCEDURES  MRCP    LABORATORY  Lab Results   Component Value Date    SODIUM 132 (L) 04/17/2020    POTASSIUM 4.3 04/17/2020    CHLORIDE 100 04/17/2020    CO2 22 04/17/2020    GLUCOSE 100 (H) 04/17/2020    BUN 16 04/17/2020    CREATININE 0.69 04/17/2020        Lab Results   Component Value Date    WBC 6.9 04/16/2020    HEMOGLOBIN 14.1 04/16/2020    HEMATOCRIT 40.5 (L) 04/16/2020    PLATELETCT 223 04/16/2020        Total time of the discharge process exceeds 36 minutes.

## 2020-04-17 NOTE — DISCHARGE INSTRUCTIONS
Discharge Instructions per Danny Navarro M.D.    You are admitted with profound jaundice, in this setting it is called cholestatic hepatitis.  This is most likely due to your prolonged Bactrim course.  It is unlikely you had any infection of the gallbladder or liver.  We nhan additional labs for further follow-up, these are pending at this time.  Please follow-up with gastroenterology by next week if they do not contact you.  I have also placed an order for you to have outpatient labs be drawn on Monday.                                                                                                      Cholestatic Hepatitis  Bilirubin is the substance that causes jaundice, or the yellow-orange color of someone with a serious liver problem. If you put these two terms together, it's easy to see that “cholestatic hepatitis” is any form of liver disease that causes inflammation of the liver and a problem with bile transport.  Hydroxyzine capsules or tablets  What is this medicine?  HYDROXYZINE (june DROX i zeen) is an antihistamine. This medicine is used to treat allergy symptoms. It is also used to treat anxiety and tension. This medicine can be used with other medicines to induce sleep before surgery.  This medicine may be used for other purposes; ask your health care provider or pharmacist if you have questions.  COMMON BRAND NAME(S): ANX, Atarax, Rezine, Vistaril  What should I tell my health care provider before I take this medicine?  They need to know if you have any of these conditions:  -any chronic illness  -difficulty passing urine  -glaucoma  -heart disease  -kidney disease  -liver disease  -lung disease  -an unusual or allergic reaction to hydroxyzine, cetirizine, other medicines, foods, dyes, or preservatives  -pregnant or trying to get pregnant  -breast-feeding  How should I use this medicine?  Take this medicine by mouth with a full glass of water. Follow the directions on the prescription label. You  may take this medicine with food or on an empty stomach. Take your medicine at regular intervals. Do not take your medicine more often than directed.  Talk to your pediatrician regarding the use of this medicine in children. Special care may be needed. While this drug may be prescribed for children as young as 6 years of age for selected conditions, precautions do apply.  Patients over 65 years old may have a stronger reaction and need a smaller dose.  Overdosage: If you think you have taken too much of this medicine contact a poison control center or emergency room at once.  NOTE: This medicine is only for you. Do not share this medicine with others.  What if I miss a dose?  If you miss a dose, take it as soon as you can. If it is almost time for your next dose, take only that dose. Do not take double or extra doses.  What may interact with this medicine?  -alcohol  -barbiturate medicines for sleep or seizures  -medicines for colds, allergies  -medicines for depression, anxiety, or emotional disturbances  -medicines for pain  -medicines for sleep  -muscle relaxants  This list may not describe all possible interactions. Give your health care provider a list of all the medicines, herbs, non-prescription drugs, or dietary supplements you use. Also tell them if you smoke, drink alcohol, or use illegal drugs. Some items may interact with your medicine.  What should I watch for while using this medicine?  Tell your doctor or health care professional if your symptoms do not improve.  You may get drowsy or dizzy. Do not drive, use machinery, or do anything that needs mental alertness until you know how this medicine affects you. Do not stand or sit up quickly, especially if you are an older patient. This reduces the risk of dizzy or fainting spells. Alcohol may interfere with the effect of this medicine. Avoid alcoholic drinks.  Your mouth may get dry. Chewing sugarless gum or sucking hard candy, and drinking plenty of  water may help. Contact your doctor if the problem does not go away or is severe.  This medicine may cause dry eyes and blurred vision. If you wear contact lenses you may feel some discomfort. Lubricating drops may help. See your eye doctor if the problem does not go away or is severe.  If you are receiving skin tests for allergies, tell your doctor you are using this medicine.  What side effects may I notice from receiving this medicine?  Side effects that you should report to your doctor or health care professional as soon as possible:  -fast or irregular heartbeat  -difficulty passing urine  -seizures  -slurred speech or confusion  -tremor  Side effects that usually do not require medical attention (report to your doctor or health care professional if they continue or are bothersome):  -constipation  -drowsiness  -fatigue  -headache  -stomach upset  This list may not describe all possible side effects. Call your doctor for medical advice about side effects. You may report side effects to FDA at 7-327-FDA-1438.  Where should I keep my medicine?  Keep out of the reach of children.  Store at room temperature between 15 and 30 degrees C (59 and 86 degrees F). Keep container tightly closed. Throw away any unused medicine after the expiration date.  NOTE: This sheet is a summary. It may not cover all possible information. If you have questions about this medicine, talk to your doctor, pharmacist, or health care provider.  © 2018 [employer] (2009-05-01 14:50:59)      Oxycodone tablets or capsules  What is this medicine?  OXYCODONE (ox i KOE done) is a pain reliever. It is used to treat moderate to severe pain.  This medicine may be used for other purposes; ask your health care provider or pharmacist if you have questions.  COMMON BRAND NAME(S): Dazidox, Endocodone, Oxaydo, OXECTA, OxyIR, Percolone, Roxicodone, ROXYBOND  What should I tell my health care provider before I take this medicine?  They need to  know if you have any of these conditions:  -Fernando's disease  -brain tumor  -head injury  -heart disease  -history of drug or alcohol abuse problem  -if you often drink alcohol  -kidney disease  -liver disease  -lung or breathing disease, like asthma  -mental illness  -pancreatic disease  -seizures  -thyroid disease  -an unusual or allergic reaction to oxycodone, codeine, hydrocodone, morphine, other medicines, foods, dyes, or preservatives  -pregnant or trying to get pregnant  -breast-feeding  How should I use this medicine?  Take this medicine by mouth with a glass of water. Follow the directions on the prescription label. You can take it with or without food. If it upsets your stomach, take it with food. Take your medicine at regular intervals. Do not take it more often than directed. Do not stop taking except on your doctor's advice.  Some brands of this medicine, like Oxecta, have special instructions. Ask your doctor or pharmacist if these directions are for you: Do not cut, crush or chew this medicine. Swallow only one tablet at a time. Do not wet, soak, or lick the tablet before you take it.  A special MedGuide will be given to you by the pharmacist with each prescription and refill. Be sure to read this information carefully each time.  Talk to your pediatrician regarding the use of this medicine in children. Special care may be needed.  Overdosage: If you think you have taken too much of this medicine contact a poison control center or emergency room at once.  NOTE: This medicine is only for you. Do not share this medicine with others.  What if I miss a dose?  If you miss a dose, take it as soon as you can. If it is almost time for your next dose, take only that dose. Do not take double or extra doses.  What may interact with this medicine?  This medicine may interact with the following medications:  -alcohol  -antihistamines for allergy, cough and cold  -antiviral medicines for HIV or  AIDS  -atropine  -certain antibiotics like clarithromycin, erythromycin, linezolid, rifampin  -certain medicines for anxiety or sleep  -certain medicines for bladder problems like oxybutynin, tolterodine  -certain medicines for depression like amitriptyline, fluoxetine, sertraline  -certain medicines for fungal infections like ketoconazole, itraconazole, voriconazole  -certain medicines for migraine headache like almotriptan, eletriptan, frovatriptan, naratriptan, rizatriptan, sumatriptan, zolmitriptan  -certain medicines for nausea or vomiting like dolasetron, ondansetron, palonosetron  -certain medicines for Parkinson's disease like benztropine, trihexyphenidyl  -certain medicines for seizures like phenobarbital, phenytoin, primidone  -certain medicines for stomach problems like dicyclomine, hyoscyamine  -certain medicines for travel sickness like scopolamine  -diuretics  -general anesthetics like halothane, isoflurane, methoxyflurane, propofol  -ipratropium  -local anesthetics like lidocaine, pramoxine, tetracaine  -MAOIs like Carbex, Eldepryl, Marplan, Nardil, and Parnate  -medicines that relax muscles for surgery  -methylene blue  -nilotinib  -other narcotic medicines for pain or cough  -phenothiazines like chlorpromazine, mesoridazine, prochlorperazine, thioridazine  This list may not describe all possible interactions. Give your health care provider a list of all the medicines, herbs, non-prescription drugs, or dietary supplements you use. Also tell them if you smoke, drink alcohol, or use illegal drugs. Some items may interact with your medicine.  What should I watch for while using this medicine?  Tell your doctor or health care professional if your pain does not go away, if it gets worse, or if you have new or a different type of pain. You may develop tolerance to the medicine. Tolerance means that you will need a higher dose of the medicine for pain relief. Tolerance is normal and is expected if you take  this medicine for a long time.  Do not suddenly stop taking your medicine because you may develop a severe reaction. Your body becomes used to the medicine. This does NOT mean you are addicted. Addiction is a behavior related to getting and using a drug for a non-medical reason. If you have pain, you have a medical reason to take pain medicine. Your doctor will tell you how much medicine to take. If your doctor wants you to stop the medicine, the dose will be slowly lowered over time to avoid any side effects.  There are different types of narcotic medicines (opiates). If you take more than one type at the same time or if you are taking another medicine that also causes drowsiness, you may have more side effects. Give your health care provider a list of all medicines you use. Your doctor will tell you how much medicine to take. Do not take more medicine than directed. Call emergency for help if you have problems breathing or unusual sleepiness.  You may get drowsy or dizzy. Do not drive, use machinery, or do anything that needs mental alertness until you know how the medicine affects you. Do not stand or sit up quickly, especially if you are an older patient. This reduces the risk of dizzy or fainting spells. Alcohol may interfere with the effect of this medicine. Avoid alcoholic drinks.  This medicine will cause constipation. Try to have a bowel movement at least every 2 to 3 days. If you do not have a bowel movement for 3 days, call your doctor or health care professional.  Your mouth may get dry. Chewing sugarless gum or sucking hard candy, and drinking plenty of water may help. Contact your doctor if the problem does not go away or is severe.  What side effects may I notice from receiving this medicine?  Side effects that you should report to your doctor or health care professional as soon as possible:  -allergic reactions like skin rash, itching or hives, swelling of the face, lips, or tongue  -breathing  problems  -confusion  -signs and symptoms of low blood pressure like dizziness; feeling faint or lightheaded, falls; unusually weak or tired  -trouble passing urine or change in the amount of urine  -trouble swallowing  Side effects that usually do not require medical attention (report to your doctor or health care professional if they continue or are bothersome):  -constipation  -dry mouth  -nausea, vomiting  -tiredness  This list may not describe all possible side effects. Call your doctor for medical advice about side effects. You may report side effects to FDA at 8-925-FDA-4677.  Where should I keep my medicine?  Keep out of the reach of children. This medicine can be abused. Keep your medicine in a safe place to protect it from theft. Do not share this medicine with anyone. Selling or giving away this medicine is dangerous and against the law.  Store at room temperature between 15 and 30 degrees C (59 and 86 degrees F). Protect from light. Keep container tightly closed.  This medicine may cause accidental overdose and death if it is taken by other adults, children, or pets. Flush any unused medicine down the toilet to reduce the chance of harm. Do not use the medicine after the expiration date.  NOTE: This sheet is a summary. It may not cover all possible information. If you have questions about this medicine, talk to your doctor, pharmacist, or health care provider.  © 2018 [employer] Standard (2016-11-01 16:55:57)    Discharge Instructions    Discharged to home by car with relative. Discharged via wheelchair, hospital escort: Yes.  Special equipment needed: Not Applicable, Left leg boot from home    Be sure to schedule a follow-up appointment with your primary care doctor or any specialists as instructed.     Discharge Plan:   Influenza Vaccine Indication: Not indicated: Previously immunized this influenza season and > 8 years of age    I understand that a diet low in cholesterol, fat, and sodium is  recommended for good health. Unless I have been given specific instructions below for another diet, I accept this instruction as my diet prescription.   Other diet: n/a.    Special Instructions: None    · Is patient discharged on Warfarin / Coumadin?   No     Depression / Suicide Risk    As you are discharged from this [facility], it is important to learn how to keep safe from harming yourself.    Recognize the warning signs:  · Abrupt changes in personality, positive or negative- including increase in energy   · Giving away possessions  · Change in eating patterns- significant weight changes-  positive or negative  · Change in sleeping patterns- unable to sleep or sleeping all the time   · Unwillingness or inability to communicate  · Depression  · Unusual sadness, discouragement and loneliness  · Talk of wanting to die  · Neglect of personal appearance   · Rebelliousness- reckless behavior  · Withdrawal from people/activities they love  · Confusion- inability to concentrate     If you or a loved one observes any of these behaviors or has concerns about self-harm, here's what you can do:  · Talk about it- your feelings and reasons for harming yourself  · Remove any means that you might use to hurt yourself (examples: pills, rope, extension cords, firearm)  · Get professional help from the community (Mental Health, Substance Abuse, psychological counseling)  · Do not be alone:Call your Safe Contact- someone whom you trust who will be there for you.  · Call your local CRISIS HOTLINE 260-1443 or 118-870-7314  · Call your local Children's Mobile Crisis Response Team [place] (181) 665-2751 or www.[employer]  · Call the toll free National Suicide Prevention Hotlines   · National Suicide Prevention Lifeline 636-547-MMLE (4883)  · National Hope Line Network 800-SUICIDE (451-0456)

## 2020-04-18 LAB
A1AT SERPL-MCNC: 191 MG/DL (ref 90–200)
CERULOPLASMIN SERPL-MCNC: 36 MG/DL (ref 17–54)

## 2020-04-19 LAB
MITOCHONDRIA M2 IGG SER-ACNC: 1.6 UNITS (ref 0–20)
NUCLEAR IGG SER QL IA: NORMAL

## 2020-04-20 ENCOUNTER — APPOINTMENT (OUTPATIENT)
Dept: WOUND CARE | Facility: MEDICAL CENTER | Age: 70
End: 2020-04-20
Attending: NURSE PRACTITIONER
Payer: COMMERCIAL

## 2020-04-21 ENCOUNTER — HOSPITAL ENCOUNTER (OUTPATIENT)
Dept: LAB | Facility: MEDICAL CENTER | Age: 70
End: 2020-04-21
Attending: FAMILY MEDICINE
Payer: COMMERCIAL

## 2020-04-21 ENCOUNTER — HOSPITAL ENCOUNTER (OUTPATIENT)
Dept: LAB | Facility: MEDICAL CENTER | Age: 70
End: 2020-04-21
Attending: INTERNAL MEDICINE
Payer: COMMERCIAL

## 2020-04-21 DIAGNOSIS — R79.9 ELEVATED BUN: ICD-10-CM

## 2020-04-21 DIAGNOSIS — E53.8 VITAMIN B12 DEFICIENCY: ICD-10-CM

## 2020-04-21 LAB
ALBUMIN SERPL BCP-MCNC: 3.9 G/DL (ref 3.2–4.9)
ALBUMIN/GLOB SERPL: 1.3 G/DL
ALP SERPL-CCNC: 616 U/L (ref 30–99)
ALT SERPL-CCNC: 232 U/L (ref 2–50)
ANION GAP SERPL CALC-SCNC: 11 MMOL/L (ref 7–16)
ANION GAP SERPL CALC-SCNC: 11 MMOL/L (ref 7–16)
AST SERPL-CCNC: 108 U/L (ref 12–45)
BILIRUB SERPL-MCNC: 4.1 MG/DL (ref 0.1–1.5)
BUN SERPL-MCNC: 16 MG/DL (ref 8–22)
BUN SERPL-MCNC: 16 MG/DL (ref 8–22)
CALCIUM SERPL-MCNC: 9.2 MG/DL (ref 8.5–10.5)
CALCIUM SERPL-MCNC: 9.2 MG/DL (ref 8.5–10.5)
CHLORIDE SERPL-SCNC: 99 MMOL/L (ref 96–112)
CHLORIDE SERPL-SCNC: 99 MMOL/L (ref 96–112)
CO2 SERPL-SCNC: 25 MMOL/L (ref 20–33)
CO2 SERPL-SCNC: 25 MMOL/L (ref 20–33)
CREAT SERPL-MCNC: 0.9 MG/DL (ref 0.5–1.4)
CREAT SERPL-MCNC: 0.95 MG/DL (ref 0.5–1.4)
GLOBULIN SER CALC-MCNC: 3.1 G/DL (ref 1.9–3.5)
GLUCOSE SERPL-MCNC: 98 MG/DL (ref 65–99)
GLUCOSE SERPL-MCNC: 99 MG/DL (ref 65–99)
INR PPP: 0.86 (ref 0.87–1.13)
POTASSIUM SERPL-SCNC: 4.8 MMOL/L (ref 3.6–5.5)
POTASSIUM SERPL-SCNC: 4.9 MMOL/L (ref 3.6–5.5)
PROT SERPL-MCNC: 7 G/DL (ref 6–8.2)
PROTHROMBIN TIME: 11.9 SEC (ref 12–14.6)
SODIUM SERPL-SCNC: 135 MMOL/L (ref 135–145)
SODIUM SERPL-SCNC: 135 MMOL/L (ref 135–145)
VIT B12 SERPL-MCNC: 1561 PG/ML (ref 211–911)

## 2020-04-21 PROCEDURE — 82607 VITAMIN B-12: CPT

## 2020-04-21 PROCEDURE — 80048 BASIC METABOLIC PNL TOTAL CA: CPT

## 2020-04-21 PROCEDURE — 85610 PROTHROMBIN TIME: CPT

## 2020-04-21 PROCEDURE — 36415 COLL VENOUS BLD VENIPUNCTURE: CPT

## 2020-04-21 PROCEDURE — 80053 COMPREHEN METABOLIC PANEL: CPT

## 2020-04-22 ENCOUNTER — PATIENT MESSAGE (OUTPATIENT)
Dept: MEDICAL GROUP | Facility: PHYSICIAN GROUP | Age: 70
End: 2020-04-22

## 2020-04-28 ENCOUNTER — HOSPITAL ENCOUNTER (OUTPATIENT)
Dept: LAB | Facility: MEDICAL CENTER | Age: 70
End: 2020-04-28
Attending: INTERNAL MEDICINE
Payer: COMMERCIAL

## 2020-04-28 LAB
ALBUMIN SERPL BCP-MCNC: 4.3 G/DL (ref 3.2–4.9)
ALP SERPL-CCNC: 284 U/L (ref 30–99)
ALT SERPL-CCNC: 58 U/L (ref 2–50)
AST SERPL-CCNC: 27 U/L (ref 12–45)
BILIRUB CONJ SERPL-MCNC: 1 MG/DL (ref 0.1–0.5)
BILIRUB INDIRECT SERPL-MCNC: 1.2 MG/DL (ref 0–1)
BILIRUB SERPL-MCNC: 2.2 MG/DL (ref 0.1–1.5)
INR PPP: 0.94 (ref 0.87–1.13)
PROT SERPL-MCNC: 7.1 G/DL (ref 6–8.2)
PROTHROMBIN TIME: 12.8 SEC (ref 12–14.6)

## 2020-04-28 PROCEDURE — 80076 HEPATIC FUNCTION PANEL: CPT

## 2020-04-28 PROCEDURE — 36415 COLL VENOUS BLD VENIPUNCTURE: CPT

## 2020-04-28 PROCEDURE — 85610 PROTHROMBIN TIME: CPT

## 2020-05-06 LAB
FUNGUS SPEC CULT: NORMAL
SIGNIFICANT IND 70042: NORMAL
SITE SITE: NORMAL
SOURCE SOURCE: NORMAL

## 2020-06-02 ENCOUNTER — NON-PROVIDER VISIT (OUTPATIENT)
Dept: WOUND CARE | Facility: MEDICAL CENTER | Age: 70
End: 2020-06-02
Attending: ORTHOPAEDIC SURGERY
Payer: COMMERCIAL

## 2020-06-02 ENCOUNTER — HOSPITAL ENCOUNTER (OUTPATIENT)
Dept: LAB | Facility: MEDICAL CENTER | Age: 70
End: 2020-06-02
Attending: INTERNAL MEDICINE
Payer: COMMERCIAL

## 2020-06-02 LAB
ALBUMIN SERPL BCP-MCNC: 4.5 G/DL (ref 3.2–4.9)
ALP SERPL-CCNC: 79 U/L (ref 30–99)
ALT SERPL-CCNC: 16 U/L (ref 2–50)
AST SERPL-CCNC: 16 U/L (ref 12–45)
BILIRUB CONJ SERPL-MCNC: 0.3 MG/DL (ref 0.1–0.5)
BILIRUB INDIRECT SERPL-MCNC: 0.8 MG/DL (ref 0–1)
BILIRUB SERPL-MCNC: 1.1 MG/DL (ref 0.1–1.5)
INR PPP: 0.96 (ref 0.87–1.13)
PROT SERPL-MCNC: 7.2 G/DL (ref 6–8.2)
PROTHROMBIN TIME: 13 SEC (ref 12–14.6)

## 2020-06-02 PROCEDURE — 80076 HEPATIC FUNCTION PANEL: CPT

## 2020-06-02 PROCEDURE — 36415 COLL VENOUS BLD VENIPUNCTURE: CPT

## 2020-06-02 PROCEDURE — 85610 PROTHROMBIN TIME: CPT

## 2020-06-02 PROCEDURE — 99211 OFF/OP EST MAY X REQ PHY/QHP: CPT

## 2020-06-02 PROCEDURE — 97597 DBRDMT OPN WND 1ST 20 CM/<: CPT

## 2020-06-02 RX ORDER — UBIDECARENONE 75 MG
100 CAPSULE ORAL
COMMUNITY
End: 2021-12-15

## 2020-06-02 NOTE — PATIENT INSTRUCTIONS
Avoid prolonged standing or sitting without elevating your legs.  - Apply tubigrip to your legs ending 2 fingers below back of knee without wrinkles.   Remove compression if painful.    Should you experience any significant changes in your wound(s), such as infection (redness, swelling, localized heat, increased pain, fever > 101 F, chills) or have any questions regarding your home care instructions, please contact the wound center at [phone]. If after hours, contact your primary care physician or go to the hospital emergency room.   Keep dressing clean, dry and covered while bathing. Only change dressing if it becomes over saturated, soiled or falls off or once during the week as demonstrated during your appointment.   Your supplies will come from [facility].
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
## 2020-06-02 NOTE — PROCEDURES
CSWD with curette to remove non-viable slough and crust layer over LLE achilles ankle wound of ~8cm2 after application of topical lidocaine. Then ARIANA Duarte removed two clear sutures, one at proximal aspect & one at distal aspect in wound bed.
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
## 2020-06-11 ENCOUNTER — OFFICE VISIT (OUTPATIENT)
Dept: WOUND CARE | Facility: MEDICAL CENTER | Age: 70
End: 2020-06-11
Attending: ORTHOPAEDIC SURGERY
Payer: COMMERCIAL

## 2020-06-11 VITALS
OXYGEN SATURATION: 96 % | TEMPERATURE: 96.8 F | HEART RATE: 69 BPM | DIASTOLIC BLOOD PRESSURE: 76 MMHG | SYSTOLIC BLOOD PRESSURE: 125 MMHG | RESPIRATION RATE: 16 BRPM

## 2020-06-11 DIAGNOSIS — M67.972 ACHILLES TENDON DISORDER, LEFT: ICD-10-CM

## 2020-06-11 DIAGNOSIS — T81.31XD DEHISCENCE OF OPERATIVE WOUND, SUBSEQUENT ENCOUNTER: ICD-10-CM

## 2020-06-11 PROCEDURE — 11042 DBRDMT SUBQ TIS 1ST 20SQCM/<: CPT | Performed by: NURSE PRACTITIONER

## 2020-06-11 PROCEDURE — 11042 DBRDMT SUBQ TIS 1ST 20SQCM/<: CPT

## 2020-06-11 ASSESSMENT — ENCOUNTER SYMPTOMS
DEPRESSION: 0
DIARRHEA: 0
MYALGIAS: 0
COUGH: 0
NAUSEA: 0
NERVOUS/ANXIOUS: 0
CHILLS: 0
CLAUDICATION: 0
CONSTIPATION: 0
FEVER: 0
VOMITING: 0
SHORTNESS OF BREATH: 0

## 2020-06-11 NOTE — PATIENT INSTRUCTIONS
-Keep dressings clean, dry and covered while bathing. Only change dressings if they become over saturated, soiled or fall off.     -Avoid prolonged standing or sitting without elevating your legs.    -Should you experience any significant changes in your wound(s), such as infection (redness, swelling, localized heat, increased pain, fever > 101 F, chills) or have any questions regarding your home care instructions, please contact the wound center at [phone]. If after hours, contact your primary care physician or go to the hospital emergency room.

## 2020-06-11 NOTE — PROGRESS NOTES
Provider Encounter- Full Thickness wound    HISTORY OF PRESENT ILLNESS  Wound History:    START OF CARE IN CLINIC: 6/2/2020 (return to clinic after surgery)    REFERRING PROVIDER: Kal uHynh MD     WOUND- Full Thickness Wound   LOCATION: Left Achilles tendon.   HISTORY:  Mr. Clifton is an otherwise healthy man who presented to the clinic with a full-thickness wound to his Achilles, over site of remote injury, which recurred spontaneously.  He has a history of several orthopedic surgeries, over 10-20 years ago and has tibial sarah as well as a healed fibula fracture.  He has had no problems with these injuries recently, but noticed a new concerning area in late January early February of this year, that became a full thickness wound.   He was seen by orthopedic surgeon, Dr. Kal Huynh, who recommended an MRI and wound care.   Patient was then taken to surgery by Dr. Huynh on 4/9/2020 for an I&D, application of ACell, wound closure and incisional VAC placement.  Patient called clinic the following day with concerns about the VAC.  VAC had been alarming since the previous night, and neither he or his wife know how to troubleshoot.  He was brought back into the clinic on 4/10 for evaluation and to resume care.  During his clinic visit on 4/15, he was noted to be quite jaundiced.  He was hospitalized the following day, Bactrim was determined to be the probable cause of patient's jaundice.  He was discharged home on 4/17, and chose to treat the wound himself by applying silver Hydrofiber during the day and leaving open to air at night.  During his follow-up with Dr. Huynh, he was referred back to [facility] to resume care of this wound..         Pertinent Medical History: none     TOBACCO USE: none    Patient's problem list, allergies, and current medications reviewed and updated in Epic    Interval History:  6/11/2020 : Clinic visit with ARIANA Beltrán.  Burton states he is feeling well,denies fevers, chills, nausea,  "vomiting, cough or shortness of breath.  He has had a little bit of strikethrough on his outer dressing, but states \"not much boat.    REVIEW OF SYSTEMS:   Review of Systems   Constitutional: Negative for chills and fever.   Respiratory: Negative for cough and shortness of breath.    Cardiovascular: Negative for chest pain, claudication and leg swelling.   Gastrointestinal: Negative for constipation, diarrhea, nausea and vomiting.   Genitourinary: Negative for dysuria.   Musculoskeletal: Negative for myalgias.   Skin: Negative for itching and rash.   Psychiatric/Behavioral: Negative for depression. The patient is not nervous/anxious.    All other systems reviewed and are negative.      PHYSICAL EXAMINATION:   /76   Pulse 69   Temp 36 °C (96.8 °F) (Temporal)   Resp 16   SpO2 96%     Physical Exam   Constitutional: He is oriented to person, place, and time and well-developed, well-nourished, and in no distress.   HENT:   Head: Normocephalic and atraumatic.   Eyes: Pupils are equal, round, and reactive to light. Conjunctivae are normal.   Neck: Neck supple.   Cardiovascular: Normal rate and intact distal pulses.   Pulmonary/Chest: Effort normal. No respiratory distress.   Musculoskeletal:         General: No edema.   Neurological: He is alert and oriented to person, place, and time.   Skin: Skin is warm.   Full-thickness wound to left Achilles, dehisced surgical site  Refer to wound flowsheet and photos   Psychiatric: Mood, memory, affect and judgment normal.       WOUND ASSESSMENT  Wound 04/16/20 Incision Ankle Left Achilles (Active)   Wound Image    06/11/20 0815   Site Assessment Pink;Yellow 06/11/20 0815   Periwound Assessment Edema 06/11/20 0815   Margins Unattached edges 06/11/20 0815   Closure Secondary intention 06/02/20 0932   Drainage Amount Moderate 06/11/20 0815   Drainage Description Serosanguineous 06/11/20 0815   Treatments Cleansed;Topical Lidocaine;Provider debridement 06/11/20 0815   Wound " Cleansing Normal Saline Irrigation 06/11/20 0815   Periwound Protectant Skin Protectant Wipes to Periwound;Barrier Paste 06/11/20 0815   Dressing Cleansing/Solutions Normal Saline 06/02/20 0932   Dressing Options Hydrofiber Silver;Nonadhesive Foam;Hypafix Tape;Other (Comments) 06/11/20 0815   Dressing Changed New 06/11/20 0815   Dressing Status Dry;Intact;Clean 06/02/20 0932   Dressing Change/Treatment Frequency Every 72 hrs, and As Needed 06/02/20 0932   Number of Sutures Removed 2 06/02/20 0932   Non-staged Wound Description Full thickness 06/11/20 0815   Wound Length (cm) 5.5 cm 06/11/20 0815   Wound Width (cm) 1.4 cm 06/11/20 0815   Wound Depth (cm) 0.4 cm 06/11/20 0815   Wound Surface Area (cm^2) 7.7 cm^2 06/11/20 0815   Wound Volume (cm^3) 3.08 cm^3 06/11/20 0815   Post-Procedure Length (cm) 5.5 cm 06/11/20 0815   Post-Procedure Width (cm) 2 cm 06/11/20 0815   Post-Procedure Depth (cm) 0.4 cm 06/11/20 0815   Post-Procedure Surface Area (cm^2) 11 cm^2 06/11/20 0815   Post-Procedure Volume (cm^3) 4.4 cm^3 06/11/20 0815   Wound Healing % 18 06/11/20 0815   Wound Bed Epithelium (%) 0 % 06/11/20 0815   Wound Bed Slough (%) 75 % 06/11/20 0815   Wound Bed Eschar (%) 0 % 06/11/20 0815   Tunneling (cm) 0 cm 06/11/20 0815   Tunneling Clock Position of Wound 12 06/02/20 0932   Undermining (cm) 0.2 cm 06/11/20 0815   Undermining of Wound, 1st Location From 3 o'clock;To 4 o'clock 06/11/20 0815   Wound Odor None 06/11/20 0815   Pulses 2+;Left;DP;PT;Doppler 06/02/20 0932   Exposed Structures None 06/11/20 0815     PROCEDURE: Excisional debridement of left Achilles wound  -2% viscous lidocaine applied topically to wound bed for approximately 5 minutes prior to debridement  -Curette used to debride wound bed.  Excisional debridement was performed to remove devitalized tissue until healthy, bleeding tissue was visualized.   Entire surface of wound, 11.0 cm2 debrided.  Tissue debrided into the subcutaneous layer.    -Bleeding  controlled with manual pressure.    -Wound care completed by wound RN, refer to flowsheet  -Patient tolerated the procedure well, without c/o pain or discomfort.     Pertinent Labs and Diagnostics    IMAGIN/15/18  FINDINGS:  Intramedullary sarah traverses a healed distal tibial diaphyseal fracture. There is an angulated healed distal fibular diaphyseal fracture. No acute fracture or dislocation is seen. Bones are demineralized. There is mild soft tissue swelling. There is   spurring of the calcaneus at the insertion of the Achilles tendon. Atherosclerotic calcification is seen.     IMPRESSION:     Postsurgical and old posttraumatic changes of the distal tibia.     Healed angulated fracture of the distal fibula diaphysis.     Mild soft tissue swelling.     Demineralization.     Arterial calcification    VASCULAR STUDIES: pending    LAST  WOUND CULTURE:   Date: 3/23/2020   results: Moderate growth MSSA      ASSESSMENT AND PLAN:     1.  Dehiscence of operative wound, subsequent encounter  Comments: Spontaneous opening of the wound over old wound site.  Patient has history of Achilles rupture and repair, along with open wound from 34 years ago.  Patient underwent surgical I&D, ACell application, and closure of wound with incisional VAC on 2020.  Incision dehisced postoperatively.    2020: Wound area has increased since last assessment.  Quality of tissue improving  -Excisional debridement of wound in clinic today, medically necessary to promote wound healing.  -Patient to return to clinic weekly for assessment and debridement  -Patient to change dressing 1-2 times per week in between clinic visits  -Consider VAC, and/or ACell if wound fails to progress.  Patient will need at least 30 days of wound care wrist    Wound care: Silver Hydrofiber to manage exudate and bioburden, foam cover dressing, Hypafix tape    2. Achilles tendon disorder, left    PATIENT EDUCATION    -Advised to go to ER for any increased  redness, swelling, drainage, or odor, or if patient develops fever, chills, nausea or vomiting.        Patient was seen for 30 minutes face to face of which > 50% of appointment time was spent on counseling and coordination of care regarding the above.        Please note that this note may have been created using voice recognition software. I have worked with technical experts from [facility] to optimize the interface.  I have made every reasonable attempt to correct obvious errors, but there may be errors of grammar and possibly content that I did not discover before finalizing the note.

## 2020-06-16 ENCOUNTER — OFFICE VISIT (OUTPATIENT)
Dept: WOUND CARE | Facility: MEDICAL CENTER | Age: 70
End: 2020-06-16
Attending: ORTHOPAEDIC SURGERY
Payer: COMMERCIAL

## 2020-06-16 VITALS
DIASTOLIC BLOOD PRESSURE: 78 MMHG | HEART RATE: 68 BPM | RESPIRATION RATE: 16 BRPM | SYSTOLIC BLOOD PRESSURE: 113 MMHG | OXYGEN SATURATION: 96 % | TEMPERATURE: 97.6 F

## 2020-06-16 DIAGNOSIS — R52 PAIN ASSOCIATED WITH WOUND: ICD-10-CM

## 2020-06-16 DIAGNOSIS — T14.8XXA PAIN ASSOCIATED WITH WOUND: ICD-10-CM

## 2020-06-16 DIAGNOSIS — T81.31XD DEHISCENCE OF OPERATIVE WOUND, SUBSEQUENT ENCOUNTER: ICD-10-CM

## 2020-06-16 PROCEDURE — 11042 DBRDMT SUBQ TIS 1ST 20SQCM/<: CPT | Performed by: NURSE PRACTITIONER

## 2020-06-16 PROCEDURE — 11042 DBRDMT SUBQ TIS 1ST 20SQCM/<: CPT

## 2020-06-16 ASSESSMENT — ENCOUNTER SYMPTOMS
COUGH: 0
MYALGIAS: 0
CONSTIPATION: 0
DIARRHEA: 0
NAUSEA: 0
DEPRESSION: 0
SHORTNESS OF BREATH: 0
CHILLS: 0
NERVOUS/ANXIOUS: 0
CLAUDICATION: 0
VOMITING: 0
FEVER: 0

## 2020-06-16 NOTE — PATIENT INSTRUCTIONS
-Keep your wound dressing clean, dry, and intact.    -Change your dressing if it becomes soiled, soaked, or falls off.    -Should you experience any significant changes in your wound(s), such as infection (redness, swelling, localized heat, increased pain, fever > 101 F, chills) or have any questions regarding your home care instructions, please contact the wound center at [phone]. If after hours, contact your primary care physician or go to the hospital emergency room.

## 2020-06-16 NOTE — PROGRESS NOTES
Provider Encounter- Full Thickness wound    HISTORY OF PRESENT ILLNESS  Wound History:    START OF CARE IN CLINIC: 6/2/2020 (return to clinic after surgery)    REFERRING PROVIDER: Kal Huynh MD     WOUND- Full Thickness Wound   LOCATION: Left Achilles tendon.   HISTORY:  Mr. Clifton is an otherwise healthy man who presented to the clinic with a full-thickness wound to his Achilles, over site of remote injury, which recurred spontaneously.  He has a history of several orthopedic surgeries, over 10-20 years ago and has tibial sarah as well as a healed fibula fracture.  He has had no problems with these injuries recently, but noticed a new concerning area in late January early February of this year, that became a full thickness wound.   He was seen by orthopedic surgeon, Dr. aKl Huynh, who recommended an MRI and wound care.   Patient was then taken to surgery by Dr. Huynh on 4/9/2020 for an I&D, application of ACell, wound closure and incisional VAC placement.  Patient called clinic the following day with concerns about the VAC.  VAC had been alarming since the previous night, and neither he or his wife know how to troubleshoot.  He was brought back into the clinic on 4/10 for evaluation and to resume care.  During his clinic visit on 4/15, he was noted to be quite jaundiced.  He was hospitalized the following day, Bactrim was determined to be the probable cause of patient's jaundice.  He was discharged home on 4/17, and chose to treat the wound himself by applying silver Hydrofiber during the day and leaving open to air at night.  During his follow-up with Dr. Huynh, he was referred back to [facility] to resume care of this wound..         Pertinent Medical History: none     TOBACCO USE: none    Patient's problem list, allergies, and current medications reviewed and updated in Epic    Interval History:  6/11/2020 : Clinic visit with ARIANA Beltrán.  Burton states he is feeling well,denies fevers, chills, nausea,  "vomiting, cough or shortness of breath.  He has had a little bit of strikethrough on his outer dressing, but states \"not much boat.    6/16/2020: Clinic visit with ARIANA Lucas. Patient states that they are feeling well today.  Patient denies fever, chills, nausea, vomiting, lightheadedness, dizziness, shortness of breath and chest pain.  Granulation tissue forming to base of wound wound is smaller in width.      REVIEW OF SYSTEMS:   Review of Systems   Constitutional: Negative for chills and fever.   Respiratory: Negative for cough and shortness of breath.    Cardiovascular: Negative for chest pain, claudication and leg swelling.   Gastrointestinal: Negative for constipation, diarrhea, nausea and vomiting.   Genitourinary: Negative for dysuria.   Musculoskeletal: Negative for myalgias.   Skin: Negative for itching and rash.   Psychiatric/Behavioral: Negative for depression. The patient is not nervous/anxious.    All other systems reviewed and are negative.      PHYSICAL EXAMINATION:   /78   Pulse 68   Temp 36.4 °C (97.6 °F)   Resp 16   SpO2 96%     Physical Exam   Constitutional: He is oriented to person, place, and time and well-developed, well-nourished, and in no distress.   HENT:   Head: Normocephalic and atraumatic.   Eyes: Pupils are equal, round, and reactive to light. Conjunctivae are normal.   Neck: Neck supple.   Cardiovascular: Normal rate and intact distal pulses.   Pulmonary/Chest: Effort normal. No respiratory distress.   Musculoskeletal:         General: No edema.   Neurological: He is alert and oriented to person, place, and time.   Skin: Skin is warm.   Full-thickness wound to left Achilles, dehisced surgical site  Refer to wound flowsheet and photos   Psychiatric: Mood, memory, affect and judgment normal.       WOUND ASSESSMENT    Wound 04/16/20 Incision Ankle Left Achilles (Active)   Wound Image    06/16/20 0800   Site Assessment Pink;Yellow 06/16/20 0800   Periwound " Assessment Pink;Edema;Blanchable erythema 06/16/20 0800   Margins Attached edges;Unattached edges 06/16/20 0800   Closure Secondary intention 06/16/20 0800   Drainage Amount Small 06/16/20 0800   Drainage Description Serosanguineous 06/16/20 0800   Treatments Cleansed;Topical Lidocaine;Provider debridement 06/16/20 0800   Wound Cleansing Normal Saline Irrigation 06/16/20 0800   Periwound Protectant Skin Protectant Wipes to Periwound;Barrier Paste 06/16/20 0800   Dressing Cleansing/Solutions Not Applicable 06/16/20 0800   Dressing Options Hydrofiber Silver;Nonadhesive Foam;Hypafix Tape;Other (Comments) 06/11/20 0815   Dressing Changed Changed 06/16/20 0800   Dressing Status Dry;Intact;Clean 06/16/20 0800   Dressing Change/Treatment Frequency Every 72 hrs, and As Needed 06/16/20 0800   Number of Sutures Removed 2 06/02/20 0932   Non-staged Wound Description Full thickness 06/16/20 0800   Wound Length (cm) 5.2 cm 06/16/20 0800   Wound Width (cm) 1.5 cm 06/16/20 0800   Wound Depth (cm) 0.2 cm 06/16/20 0800   Wound Surface Area (cm^2) 7.8 cm^2 06/16/20 0800   Wound Volume (cm^3) 1.56 cm^3 06/16/20 0800   Post-Procedure Length (cm) 5.4 cm 06/16/20 0800   Post-Procedure Width (cm) 1.5 cm 06/16/20 0800   Post-Procedure Depth (cm) 0.3 cm 06/16/20 0800   Post-Procedure Surface Area (cm^2) 8.1 cm^2 06/16/20 0800   Post-Procedure Volume (cm^3) 2.43 cm^3 06/16/20 0800   Wound Healing % 58 06/16/20 0800   Wound Bed Epithelium (%) 0 % 06/11/20 0815   Wound Bed Slough (%) 75 % 06/11/20 0815   Wound Bed Eschar (%) 0 % 06/11/20 0815   Tunneling (cm) 0 cm 06/16/20 0800   Tunneling Clock Position of Wound 12 06/02/20 0932   Undermining (cm) 0.1 cm 06/16/20 0800   Undermining of Wound, 1st Location From 3 o'clock;To 4 o'clock 06/16/20 0800   Wound Odor None 06/16/20 0800   Pulses 2+;Left;DP;PT;Doppler 06/02/20 0932   Exposed Structures None 06/16/20 0800            PROCEDURE: Excisional debridement of left Achilles wound  -2% viscous  lidocaine applied topically to wound bed for approximately 5 minutes prior to debridement  -Curette used to debride wound bed.  Excisional debridement was performed to remove devitalized tissue until healthy, bleeding tissue was visualized.   Entire surface of wound, 8.1 cm2 debrided.  Tissue debrided into the subcutaneous layer.    -Bleeding controlled with manual pressure.    -Wound care completed by wound RN, refer to flowsheet  -Patient tolerated the procedure well, without c/o pain or discomfort.     Pertinent Labs and Diagnostics    IMAGIN/15/18  FINDINGS:  Intramedullary sarah traverses a healed distal tibial diaphyseal fracture. There is an angulated healed distal fibular diaphyseal fracture. No acute fracture or dislocation is seen. Bones are demineralized. There is mild soft tissue swelling. There is   spurring of the calcaneus at the insertion of the Achilles tendon. Atherosclerotic calcification is seen.     IMPRESSION:     Postsurgical and old posttraumatic changes of the distal tibia.     Healed angulated fracture of the distal fibula diaphysis.     Mild soft tissue swelling.     Demineralization.     Arterial calcification    VASCULAR STUDIES: pending    LAST  WOUND CULTURE:   Date: 3/23/2020   results: Moderate growth MSSA      ASSESSMENT AND PLAN:     1.  Dehiscence of operative wound, subsequent encounter  Comments: Spontaneous opening of the wound over old wound site.  Patient has history of Achilles rupture and repair, along with open wound from 34 years ago.  Patient underwent surgical I&D, ACell application, and closure of wound with incisional VAC on 2020.  Incision dehisced postoperatively.    2020:   -Excisional debridement of wound in clinic today, medically necessary to promote wound healing.  -Patient to return to clinic weekly for assessment and debridement  -Patient to change dressing 1-2 times per week in between clinic visits  -Wound seems to be progressing slowly but we  will consider wound VAC or ACell if wound stalls or does not progress in a steady manner.    Wound care: Silver Hydrofiber and adhesive foam dressing for reduction and bioburden and for exudate absorption.    2.  Pain associated with wound  -2% viscous lidocaine applied topically to wound bed for approximately 5 minutes prior to debridement  -Patient tolerated procedure today with no complaints of discomfort.      PATIENT EDUCATION    -Advised to go to ER for any increased redness, swelling, drainage, or odor, or if patient develops fever, chills, nausea or vomiting.           Please note that this note may have been created using voice recognition software. I have worked with technical experts from [facility] to optimize the interface.  I have made every reasonable attempt to correct obvious errors, but there may be errors of grammar and possibly content that I did not discover before finalizing the note.

## 2020-06-23 ENCOUNTER — OFFICE VISIT (OUTPATIENT)
Dept: WOUND CARE | Facility: MEDICAL CENTER | Age: 70
End: 2020-06-23
Attending: ORTHOPAEDIC SURGERY
Payer: COMMERCIAL

## 2020-06-23 VITALS
RESPIRATION RATE: 16 BRPM | OXYGEN SATURATION: 96 % | DIASTOLIC BLOOD PRESSURE: 73 MMHG | HEART RATE: 68 BPM | TEMPERATURE: 97.3 F | SYSTOLIC BLOOD PRESSURE: 107 MMHG

## 2020-06-23 DIAGNOSIS — T14.8XXA PAIN ASSOCIATED WITH WOUND: ICD-10-CM

## 2020-06-23 DIAGNOSIS — R52 PAIN ASSOCIATED WITH WOUND: ICD-10-CM

## 2020-06-23 DIAGNOSIS — T81.31XD DEHISCENCE OF OPERATIVE WOUND, SUBSEQUENT ENCOUNTER: ICD-10-CM

## 2020-06-23 PROCEDURE — 11042 DBRDMT SUBQ TIS 1ST 20SQCM/<: CPT | Performed by: NURSE PRACTITIONER

## 2020-06-23 PROCEDURE — 11042 DBRDMT SUBQ TIS 1ST 20SQCM/<: CPT

## 2020-06-23 ASSESSMENT — ENCOUNTER SYMPTOMS
NAUSEA: 0
COUGH: 0
CONSTIPATION: 0
MYALGIAS: 0
CLAUDICATION: 0
NERVOUS/ANXIOUS: 0
DEPRESSION: 0
SHORTNESS OF BREATH: 0
FEVER: 0
CHILLS: 0
VOMITING: 0
DIARRHEA: 0

## 2020-06-23 NOTE — PATIENT INSTRUCTIONS
-Keep dressings clean, dry and covered while bathing. Change dressings if they become over saturated, soiled or fall off.     -Avoid prolonged standing or sitting without elevating your legs.    -Remove your compression garments if you have severe pain, severe swelling, numbness, color change, or temperature change in your toes. If you need to remove your compression garments, do so by unrolling them. Do not cut the compression garments off, this is to prevent cutting yourself on accident.    -Should you experience any significant changes in your wound(s), such as infection (redness, swelling, localized heat, increased pain, fever > 101 F, chills) or have any questions regarding your home care instructions, please contact the wound center at [phone]. If after hours, contact your primary care physician or go to the hospital emergency room.

## 2020-06-23 NOTE — PROGRESS NOTES
Provider Encounter- Full Thickness wound    HISTORY OF PRESENT ILLNESS  Wound History:    START OF CARE IN CLINIC: 6/2/2020 (return to clinic after surgery)    REFERRING PROVIDER: Kal Huynh MD     WOUND- Full Thickness Wound   LOCATION: Left Achilles tendon.   HISTORY:  Mr. Clifton is an otherwise healthy man who presented to the clinic with a full-thickness wound to his Achilles, over site of remote injury, which recurred spontaneously.  He has a history of several orthopedic surgeries, over 10-20 years ago and has tibial sarah as well as a healed fibula fracture.  He has had no problems with these injuries recently, but noticed a new concerning area in late January early February of this year, that became a full thickness wound.   He was seen by orthopedic surgeon, Dr. Kal Huynh, who recommended an MRI and wound care.   Patient was then taken to surgery by Dr. Huynh on 4/9/2020 for an I&D, application of ACell, wound closure and incisional VAC placement.  Patient called clinic the following day with concerns about the VAC.  VAC had been alarming since the previous night, and neither he or his wife know how to troubleshoot.  He was brought back into the clinic on 4/10 for evaluation and to resume care.  During his clinic visit on 4/15, he was noted to be quite jaundiced.  He was hospitalized the following day, Bactrim was determined to be the probable cause of patient's jaundice.  He was discharged home on 4/17, and chose to treat the wound himself by applying silver Hydrofiber during the day and leaving open to air at night.  During his follow-up with Dr. Huynh, he was referred back to [facility] to resume care of this wound..         Pertinent Medical History: none     TOBACCO USE: none    Patient's problem list, allergies, and current medications reviewed and updated in Epic    Interval History:  6/11/2020 : Clinic visit with ARIANA Beltrán.  Burton states he is feeling well,denies fevers, chills, nausea,  "vomiting, cough or shortness of breath.  He has had a little bit of strikethrough on his outer dressing, but states \"not much boat.    6/16/2020: Clinic visit with ARIANA Lucas. Patient states that they are feeling well today.  Patient denies fever, chills, nausea, vomiting, lightheadedness, dizziness, shortness of breath and chest pain.  Granulation tissue forming to base of wound wound is smaller in width.      6/23/2020 : Clinic visit with ARIANA Beltrán.   Burton states he is feeling well,denies fevers, chills, nausea, vomiting, cough or shortness of breath.  He reports very little drainage from his wounds this week.  He has not been wearing his compression consistently.      REVIEW OF SYSTEMS:   Review of Systems   Constitutional: Negative for chills and fever.   Respiratory: Negative for cough and shortness of breath.    Cardiovascular: Negative for chest pain, claudication and leg swelling.   Gastrointestinal: Negative for constipation, diarrhea, nausea and vomiting.   Genitourinary: Negative for dysuria.   Musculoskeletal: Negative for myalgias.   Skin: Negative for itching and rash.   Psychiatric/Behavioral: Negative for depression. The patient is not nervous/anxious.    All other systems reviewed and are negative.      PHYSICAL EXAMINATION:   /73   Pulse 68   Temp 36.3 °C (97.3 °F) (Temporal)   Resp 16   SpO2 96%     Physical Exam   Constitutional: He is oriented to person, place, and time and well-developed, well-nourished, and in no distress.   HENT:   Head: Normocephalic and atraumatic.   Eyes: Pupils are equal, round, and reactive to light. Conjunctivae are normal.   Neck: Neck supple.   Cardiovascular: Normal rate and intact distal pulses.   Pulmonary/Chest: Effort normal. No respiratory distress.   Musculoskeletal:         General: No edema.   Neurological: He is alert and oriented to person, place, and time.   Skin: Skin is warm.   Full-thickness wound to left Achilles, dehisced " surgical site  Refer to wound flowsheet and photos   Psychiatric: Mood, memory, affect and judgment normal.       WOUND ASSESSMENT      Wound 04/16/20 Incision Ankle Left Achilles (Active)   Wound Image    06/23/20 0815   Site Assessment Pink;Yellow 06/23/20 0815   Periwound Assessment Edema 06/23/20 0815   Margins Unattached edges 06/23/20 0815   Closure Secondary intention 06/16/20 0800   Drainage Amount Small 06/23/20 0815   Drainage Description Serosanguineous 06/23/20 0815   Treatments Cleansed;Topical Lidocaine;Provider debridement 06/23/20 0815   Wound Cleansing Normal Saline Irrigation 06/23/20 0815   Periwound Protectant Skin Protectant Wipes to Periwound 06/23/20 0815   Dressing Cleansing/Solutions Not Applicable 06/16/20 0800   Dressing Options Hydrofiber Silver;Silicone Adhesive Foam;Other (Comments) 06/23/20 0815   Dressing Changed Changed 06/23/20 0815   Dressing Status Dry;Intact;Clean 06/16/20 0800   Dressing Change/Treatment Frequency Every 72 hrs, and As Needed 06/16/20 0800   Number of Sutures Removed 2 06/02/20 0932   Non-staged Wound Description Full thickness 06/23/20 0815   Wound Length (cm) 4.1 cm 06/23/20 0815   Wound Width (cm) 1.1 cm 06/23/20 0815   Wound Depth (cm) 0.3 cm 06/23/20 0815   Wound Surface Area (cm^2) 4.51 cm^2 06/23/20 0815   Wound Volume (cm^3) 1.35 cm^3 06/23/20 0815   Post-Procedure Length (cm) 4.1 cm 06/23/20 0815   Post-Procedure Width (cm) 1.4 cm 06/23/20 0815   Post-Procedure Depth (cm) 0.4 cm 06/23/20 0815   Post-Procedure Surface Area (cm^2) 5.74 cm^2 06/23/20 0815   Post-Procedure Volume (cm^3) 2.3 cm^3 06/23/20 0815   Wound Healing % 64 06/23/20 0815   Wound Bed Epithelium (%) 0 % 06/23/20 0815   Wound Bed Slough (%) 60 % 06/23/20 0815   Wound Bed Eschar (%) 0 % 06/23/20 0815   Tunneling (cm) 0 cm 06/23/20 0815   Tunneling Clock Position of Wound 12 06/02/20 0932   Undermining (cm) 0.1 cm 06/23/20 0815   Undermining of Wound, 1st Location From 3 o'clock;To 4  o'clock 20 0815   Wound Odor None 2015   Pulses 2+;Left;DP;PT;Doppler 20 0932   Exposed Structures None 20            PROCEDURE: Excisional debridement of left Achilles wound  -2% viscous lidocaine applied topically to wound bed for approximately 5 minutes prior to debridement  -Curette used to debride wound bed.  Excisional debridement was performed to remove devitalized tissue until healthy, bleeding tissue was visualized.   Entire surface of wound, 5.74 cm2 debrided.  Tissue debrided into the subcutaneous layer.    -Bleeding controlled with manual pressure.    -Wound care completed by wound RN, refer to flowsheet  -Patient tolerated the procedure well, without c/o pain or discomfort.     Pertinent Labs and Diagnostics    IMAGIN/15/18  FINDINGS:  Intramedullary sarah traverses a healed distal tibial diaphyseal fracture. There is an angulated healed distal fibular diaphyseal fracture. No acute fracture or dislocation is seen. Bones are demineralized. There is mild soft tissue swelling. There is   spurring of the calcaneus at the insertion of the Achilles tendon. Atherosclerotic calcification is seen.     IMPRESSION:     Postsurgical and old posttraumatic changes of the distal tibia.     Healed angulated fracture of the distal fibula diaphysis.     Mild soft tissue swelling.     Demineralization.     Arterial calcification    VASCULAR STUDIES: pending    LAST  WOUND CULTURE:   Date: 2020   Culture Result  Abnormal     Bacteroides vulgatus   Light growth      Culture Result  Abnormal     Pasteurella multocida   Moderate growth     Culture Result  Abnormal     Viridans Streptococcus   Heavy growth     Culture Result  Abnormal     Diphtheroids   Moderate growth            ASSESSMENT AND PLAN:     1.  Dehiscence of operative wound, subsequent encounter  Comments: Spontaneous opening of the wound over old wound site.  Patient has history of Achilles rupture and repair, along with  open wound from 34 years ago.  Patient underwent surgical I&D, ACell application, and closure of wound with incisional VAC on 4/9/2020.  Incision dehisced postoperatively.    6/23/2020:   -Excisional debridement of wound in clinic today, medically necessary to promote wound healing.  -Patient to return to clinic weekly for assessment and debridement  -Patient to change dressing 1-2 times per week in between clinic visits  -Wound seems to be progressing slowly but we will consider wound VAC or ACell if wound stalls or does not progress in a steady manner.    Wound care: Silver Hydrofiber and adhesive foam dressing for reduction and bioburden and for exudate absorption.    2.  Pain associated with wound  -2% viscous lidocaine applied topically to wound bed for approximately 5 minutes prior to debridement  -Patient tolerated procedure today with no complaints of discomfort.      PATIENT EDUCATION    -Advised to go to ER for any increased redness, swelling, drainage, or odor, or if patient develops fever, chills, nausea or vomiting.           Please note that this note may have been created using voice recognition software. I have worked with technical experts from [employer] to optimize the interface.  I have made every reasonable attempt to correct obvious errors, but there may be errors of grammar and possibly content that I did not discover before finalizing the note.

## 2020-06-30 ENCOUNTER — OFFICE VISIT (OUTPATIENT)
Dept: WOUND CARE | Facility: MEDICAL CENTER | Age: 70
End: 2020-06-30
Attending: ORTHOPAEDIC SURGERY
Payer: COMMERCIAL

## 2020-06-30 VITALS
DIASTOLIC BLOOD PRESSURE: 78 MMHG | HEART RATE: 69 BPM | OXYGEN SATURATION: 98 % | RESPIRATION RATE: 18 BRPM | SYSTOLIC BLOOD PRESSURE: 111 MMHG | TEMPERATURE: 97.3 F

## 2020-06-30 DIAGNOSIS — T81.31XD DEHISCENCE OF OPERATIVE WOUND, SUBSEQUENT ENCOUNTER: ICD-10-CM

## 2020-06-30 DIAGNOSIS — R52 PAIN ASSOCIATED WITH WOUND: ICD-10-CM

## 2020-06-30 DIAGNOSIS — T14.8XXA PAIN ASSOCIATED WITH WOUND: ICD-10-CM

## 2020-06-30 PROCEDURE — 11042 DBRDMT SUBQ TIS 1ST 20SQCM/<: CPT

## 2020-06-30 PROCEDURE — 11042 DBRDMT SUBQ TIS 1ST 20SQCM/<: CPT | Performed by: NURSE PRACTITIONER

## 2020-06-30 ASSESSMENT — ENCOUNTER SYMPTOMS
CONSTIPATION: 0
DIARRHEA: 0
NERVOUS/ANXIOUS: 0
VOMITING: 0
SHORTNESS OF BREATH: 0
MYALGIAS: 0
CHILLS: 0
COUGH: 0
FEVER: 0
DEPRESSION: 0
NAUSEA: 0
CLAUDICATION: 0

## 2020-06-30 NOTE — PATIENT INSTRUCTIONS
-Keep dressings clean, dry and covered while bathing. Change dressings if they become over saturated, soiled or fall off.     -Avoid prolonged standing or sitting without elevating your legs.    -Remove your compression garments if you have severe pain, severe swelling, numbness, color change, or temperature change in your toes. If you need to remove your compression garments, do so by unrolling them. Do not cut the compression garments off, this is to prevent cutting yourself on accident.    -Should you experience any significant changes in your wound(s), such as infection (redness, swelling, localized heat, increased pain, fever > 101 F, chills) or have any questions regarding your home care instructions, please contact the wound center at [phone]. If after hours, contact your primary care physician or go to the hospital emergency room.

## 2020-06-30 NOTE — PROGRESS NOTES
Provider Encounter- Full Thickness wound    HISTORY OF PRESENT ILLNESS  Wound History:    START OF CARE IN CLINIC: 6/2/2020 (return to clinic after surgery)    REFERRING PROVIDER: Kal Huynh MD     WOUND- Full Thickness Wound   LOCATION: Left Achilles tendon.   HISTORY:  Mr. Clifton is an otherwise healthy man who presented to the clinic with a full-thickness wound to his Achilles, over site of remote injury, which recurred spontaneously.  He has a history of several orthopedic surgeries, over 10-20 years ago and has tibial sarah as well as a healed fibula fracture.  He has had no problems with these injuries recently, but noticed a new concerning area in late January early February of this year, that became a full thickness wound.   He was seen by orthopedic surgeon, Dr. Kal Huynh, who recommended an MRI and wound care.   Patient was then taken to surgery by Dr. Huynh on 4/9/2020 for an I&D, application of ACell, wound closure and incisional VAC placement.  Patient called clinic the following day with concerns about the VAC.  VAC had been alarming since the previous night, and neither he or his wife know how to troubleshoot.  He was brought back into the clinic on 4/10 for evaluation and to resume care.  During his clinic visit on 4/15, he was noted to be quite jaundiced.  He was hospitalized the following day, Bactrim was determined to be the probable cause of patient's jaundice.  He was discharged home on 4/17, and chose to treat the wound himself by applying silver Hydrofiber during the day and leaving open to air at night.  During his follow-up with Dr. Huynh, he was referred back to [facility] to resume care of this wound..         Pertinent Medical History: none     TOBACCO USE: none    Patient's problem list, allergies, and current medications reviewed and updated in Epic    Interval History:  6/11/2020 : Clinic visit with ARIANA Beltrán.  Burton states he is feeling well,denies fevers, chills, nausea,  "vomiting, cough or shortness of breath.  He has had a little bit of strikethrough on his outer dressing, but states \"not much boat.    6/16/2020: Clinic visit with ARIANA Lucas. Patient states that they are feeling well today.  Patient denies fever, chills, nausea, vomiting, lightheadedness, dizziness, shortness of breath and chest pain.  Granulation tissue forming to base of wound wound is smaller in width.      6/23/2020 : Clinic visit with ARIANA Beltrán.   Burton states he is feeling well,denies fevers, chills, nausea, vomiting, cough or shortness of breath.  He reports very little drainage from his wounds this week.  He has not been wearing his compression consistently.    6/30/2020 : Clinic visit with ARIANA Beltrán.  Burton is feeling well today,denies fevers, chills, nausea, vomiting, cough or shortness of breath.  States he had to change his dressing twice over the past week, reports very little drainage.      REVIEW OF SYSTEMS:   Review of Systems   Constitutional: Negative for chills and fever.   Respiratory: Negative for cough and shortness of breath.    Cardiovascular: Negative for chest pain, claudication and leg swelling.   Gastrointestinal: Negative for constipation, diarrhea, nausea and vomiting.   Genitourinary: Negative for dysuria.   Musculoskeletal: Negative for myalgias.   Skin: Negative for itching and rash.   Psychiatric/Behavioral: Negative for depression. The patient is not nervous/anxious.    All other systems reviewed and are negative.      PHYSICAL EXAMINATION:   /78   Pulse 69   Temp 36.3 °C (97.3 °F) (Temporal)   Resp 18   SpO2 98%     Physical Exam   Constitutional: He is oriented to person, place, and time and well-developed, well-nourished, and in no distress.   HENT:   Head: Normocephalic and atraumatic.   Eyes: Pupils are equal, round, and reactive to light. Conjunctivae are normal.   Neck: Neck supple.   Cardiovascular: Normal rate and intact distal pulses. "   Pulmonary/Chest: Effort normal. No respiratory distress.   Musculoskeletal:         General: No edema.   Neurological: He is alert and oriented to person, place, and time.   Skin: Skin is warm.   Full-thickness wound to left Achilles, dehisced surgical site  Refer to wound flowsheet and photos   Psychiatric: Mood, memory, affect and judgment normal.       WOUND ASSESSMENT         Wound 04/16/20 Incision Ankle Left Achilles (Active)   Wound Image    06/30/20 0816   Site Assessment Pink;Yellow 06/30/20 0816   Periwound Assessment Edema;Dry 06/30/20 0816   Margins Attached edges 06/30/20 0816   Closure Secondary intention 06/16/20 0800   Drainage Amount Small 06/30/20 0816   Drainage Description Serosanguineous 06/30/20 0816   Treatments Cleansed;Topical Lidocaine;Provider debridement 06/30/20 0816   Wound Cleansing Normal Saline Irrigation 06/30/20 0816   Periwound Protectant Skin Moisturizer;Skin Protectant Wipes to Periwound 06/30/20 0816   Dressing Cleansing/Solutions Not Applicable 06/16/20 0800   Dressing Options Hydrofiber Silver;Silicone Adhesive Foam;Tubigrip 06/30/20 0816   Dressing Changed Changed 06/30/20 0816   Dressing Status Dry;Intact;Clean 06/16/20 0800   Dressing Change/Treatment Frequency Every 72 hrs, and As Needed 06/16/20 0800   Number of Sutures Removed 2 06/02/20 0932   Non-staged Wound Description Full thickness 06/30/20 0816   Wound Length (cm) 3.6 cm 06/30/20 0816   Wound Width (cm) 0.8 cm 06/30/20 0816   Wound Depth (cm) 0.3 cm 06/30/20 0816   Wound Surface Area (cm^2) 2.88 cm^2 06/30/20 0816   Wound Volume (cm^3) 0.86 cm^3 06/30/20 0816   Post-Procedure Length (cm) 3.6 cm 06/30/20 0816   Post-Procedure Width (cm) 0.9 cm 06/30/20 0816   Post-Procedure Depth (cm) 0.4 cm 06/30/20 0816   Post-Procedure Surface Area (cm^2) 3.24 cm^2 06/30/20 0816   Post-Procedure Volume (cm^3) 1.3 cm^3 06/30/20 0816   Wound Healing % 77 06/30/20 0816   Wound Bed Epithelium (%) 0 % 06/30/20 0816   Wound Bed  Slough (%) 90 % 20 0816   Wound Bed Eschar (%) 0 % 20 0816   Tunneling (cm) 0 cm 20 0816   Tunneling Clock Position of Wound 12 2032   Undermining (cm) 0 cm 2016   Undermining of Wound, 1st Location From 3 o'clock;To 4 o'clock 20 0815   Wound Odor None 20   Pulses 2+;Left;DP;PT;Doppler 20   Exposed Structures None 20                 PROCEDURE: Excisional debridement of left Achilles wound  -2% viscous lidocaine applied topically to wound bed for approximately 5 minutes prior to debridement  -Curette used to debride wound bed.  Excisional debridement was performed to remove devitalized tissue until healthy, bleeding tissue was visualized.   Entire surface of wound, 3.24 cm2 debrided.  Tissue debrided into the subcutaneous layer.    -Bleeding controlled with manual pressure.    -Wound care completed by wound RN, refer to flowsheet  -Patient tolerated the procedure well, without c/o pain or discomfort.     Pertinent Labs and Diagnostics    IMAGIN/15/18  FINDINGS:  Intramedullary sarah traverses a healed distal tibial diaphyseal fracture. There is an angulated healed distal fibular diaphyseal fracture. No acute fracture or dislocation is seen. Bones are demineralized. There is mild soft tissue swelling. There is   spurring of the calcaneus at the insertion of the Achilles tendon. Atherosclerotic calcification is seen.     IMPRESSION:     Postsurgical and old posttraumatic changes of the distal tibia.     Healed angulated fracture of the distal fibula diaphysis.     Mild soft tissue swelling.     Demineralization.     Arterial calcification    VASCULAR STUDIES: pending    LAST  WOUND CULTURE:   Date: 2020   Culture Result  Abnormal     Bacteroides vulgatus   Light growth      Culture Result  Abnormal     Pasteurella multocida   Moderate growth     Culture Result  Abnormal     Viridans Streptococcus   Heavy growth     Culture Result  Abnormal      Diphtheroids   Moderate growth            ASSESSMENT AND PLAN:     1.  Dehiscence of operative wound, subsequent encounter  Comments: Spontaneous opening of the wound over old wound site.  Patient has history of Achilles rupture and repair, along with open wound from 34 years ago.  Patient underwent surgical I&D, ACell application, and closure of wound with incisional VAC on 4/9/2020.  Incision dehisced postoperatively.    6/30/2020: Area has decreased as last assessment.  -Excisional debridement of wound in clinic today, medically necessary to promote wound healing.  -Patient to return to clinic weekly for assessment and debridement  -Patient to change dressing 1-2 times per week in between clinic visits  -Wound seems to be progressing slowly but we will consider wound VAC or ACell if wound stalls or does not progress in a steady manner.    Wound care: Silver Hydrofiber and adhesive foam dressing for reduction and bioburden and for exudate absorption.    2.  Pain associated with wound  -2% viscous lidocaine applied topically to wound bed for approximately 5 minutes prior to debridement  -Patient tolerated procedure today with no complaints of discomfort.      PATIENT EDUCATION    -Advised to go to ER for any increased redness, swelling, drainage, or odor, or if patient develops fever, chills, nausea or vomiting.           Please note that this note may have been created using voice recognition software. I have worked with technical experts from [employer] to optimize the interface.  I have made every reasonable attempt to correct obvious errors, but there may be errors of grammar and possibly content that I did not discover before finalizing the note.

## 2020-07-07 ENCOUNTER — OFFICE VISIT (OUTPATIENT)
Dept: WOUND CARE | Facility: MEDICAL CENTER | Age: 70
End: 2020-07-07
Attending: ORTHOPAEDIC SURGERY
Payer: COMMERCIAL

## 2020-07-07 VITALS
TEMPERATURE: 97 F | HEART RATE: 66 BPM | OXYGEN SATURATION: 96 % | SYSTOLIC BLOOD PRESSURE: 122 MMHG | DIASTOLIC BLOOD PRESSURE: 76 MMHG | RESPIRATION RATE: 16 BRPM

## 2020-07-07 DIAGNOSIS — T81.31XD DEHISCENCE OF OPERATIVE WOUND, SUBSEQUENT ENCOUNTER: ICD-10-CM

## 2020-07-07 DIAGNOSIS — M67.972 ACHILLES TENDON DISORDER, LEFT: ICD-10-CM

## 2020-07-07 DIAGNOSIS — R52 PAIN ASSOCIATED WITH WOUND: ICD-10-CM

## 2020-07-07 DIAGNOSIS — T14.8XXA PAIN ASSOCIATED WITH WOUND: ICD-10-CM

## 2020-07-07 PROCEDURE — 11042 DBRDMT SUBQ TIS 1ST 20SQCM/<: CPT

## 2020-07-07 PROCEDURE — 11042 DBRDMT SUBQ TIS 1ST 20SQCM/<: CPT | Performed by: NURSE PRACTITIONER

## 2020-07-07 ASSESSMENT — ENCOUNTER SYMPTOMS
NERVOUS/ANXIOUS: 0
NAUSEA: 0
CLAUDICATION: 0
CONSTIPATION: 0
MYALGIAS: 0
FEVER: 0
VOMITING: 0
DIARRHEA: 0
COUGH: 0
DEPRESSION: 0
SHORTNESS OF BREATH: 0
CHILLS: 0

## 2020-07-07 NOTE — PATIENT INSTRUCTIONS
-Keep dressings clean, dry and covered while bathing. Only change dressings if they become over saturated, soiled or fall off.     -Avoid prolonged standing or sitting without elevating your legs.    -Remove your compression garments if you have severe pain, severe swelling, numbness, color change, or temperature change in your toes. If you need to remove your compression garments, do so by unrolling them. Do not cut the compression garments off, this is to prevent cutting yourself on accident.    -Should you experience any significant changes in your wound(s), such as infection (redness, swelling, localized heat, increased pain, fever > 101 F, chills) or have any questions regarding your home care instructions, please contact the wound center at [phone]. If after hours, contact your primary care physician or go to the hospital emergency room.

## 2020-07-07 NOTE — PROGRESS NOTES
Provider Encounter- Full Thickness wound    HISTORY OF PRESENT ILLNESS  Wound History:    START OF CARE IN CLINIC: 6/2/2020 (return to clinic after surgery)    REFERRING PROVIDER: Kal Huynh MD     WOUND- Full Thickness Wound   LOCATION: Left Achilles tendon.   HISTORY:  Mr. Clifton is an otherwise healthy man who presented to the clinic with a full-thickness wound to his Achilles, over site of remote injury, which recurred spontaneously.  He has a history of several orthopedic surgeries, over 10-20 years ago and has tibial sarah as well as a healed fibula fracture.  He has had no problems with these injuries recently, but noticed a new concerning area in late January early February of this year, that became a full thickness wound.   He was seen by orthopedic surgeon, Dr. Kal Huynh, who recommended an MRI and wound care.   Patient was then taken to surgery by Dr. Huynh on 4/9/2020 for an I&D, application of ACell, wound closure and incisional VAC placement.  Patient called clinic the following day with concerns about the VAC.  VAC had been alarming since the previous night, and neither he or his wife know how to troubleshoot.  He was brought back into the clinic on 4/10 for evaluation and to resume care.  During his clinic visit on 4/15, he was noted to be quite jaundiced.  He was hospitalized the following day, Bactrim was determined to be the probable cause of patient's jaundice.  He was discharged home on 4/17, and chose to treat the wound himself by applying silver Hydrofiber during the day and leaving open to air at night.  During his follow-up with Dr. Huynh, he was referred back to [facility] to resume care of this wound..         Pertinent Medical History: none     TOBACCO USE: none    Patient's problem list, allergies, and current medications reviewed and updated in Epic    Interval History:  6/11/2020 : Clinic visit with ARIANA Beltrán.  Burton states he is feeling well,denies fevers, chills, nausea,  "vomiting, cough or shortness of breath.  He has had a little bit of strikethrough on his outer dressing, but states \"not much boat.    6/16/2020: Clinic visit with ARIANA Lucas. Patient states that they are feeling well today.  Patient denies fever, chills, nausea, vomiting, lightheadedness, dizziness, shortness of breath and chest pain.  Granulation tissue forming to base of wound wound is smaller in width.      6/23/2020 : Clinic visit with ARIANA Beltrán.   Burton states he is feeling well,denies fevers, chills, nausea, vomiting, cough or shortness of breath.  He reports very little drainage from his wounds this week.  He has not been wearing his compression consistently.    6/30/2020 : Clinic visit with ARIANA Beltrán.  Burton is feeling well today,denies fevers, chills, nausea, vomiting, cough or shortness of breath.  States he had to change his dressing twice over the past week, reports very little drainage.    7/7/2020 : Clinic visit with ARIANA Beltrán.  Burton continues to feel well,denies fevers, chills, nausea, vomiting, cough or shortness of breath.  There has not been much drainage from the wound, he had to change dressing once over the past week.      REVIEW OF SYSTEMS:   Review of Systems   Constitutional: Negative for chills and fever.   Respiratory: Negative for cough and shortness of breath.    Cardiovascular: Negative for chest pain, claudication and leg swelling.   Gastrointestinal: Negative for constipation, diarrhea, nausea and vomiting.   Genitourinary: Negative for dysuria.   Musculoskeletal: Negative for myalgias.   Skin: Negative for itching and rash.   Psychiatric/Behavioral: Negative for depression. The patient is not nervous/anxious.    All other systems reviewed and are negative.      PHYSICAL EXAMINATION:   /76   Pulse 66   Temp 36.1 °C (97 °F) (Temporal)   Resp 16   SpO2 96%     Physical Exam   Constitutional: He is oriented to person, place, and time and " well-developed, well-nourished, and in no distress.   HENT:   Head: Normocephalic and atraumatic.   Eyes: Pupils are equal, round, and reactive to light. Conjunctivae are normal.   Neck: Neck supple.   Cardiovascular: Normal rate and intact distal pulses.   Pulmonary/Chest: Effort normal. No respiratory distress.   Musculoskeletal:         General: No edema.   Neurological: He is alert and oriented to person, place, and time.   Skin: Skin is warm.   Full-thickness wound to left Achilles, dehisced surgical site  Refer to wound flowsheet and photos   Psychiatric: Mood, memory, affect and judgment normal.       WOUND ASSESSMENT        Wound 04/16/20 Incision Ankle Left Achilles (Active)   Wound Image    07/07/20 0805   Site Assessment Yellow;Red 07/07/20 0805   Periwound Assessment Dry;Edema;Scar tissue 07/07/20 0805   Margins Attached edges 07/07/20 0805   Closure Secondary intention 06/16/20 0800   Drainage Amount Small 07/07/20 0805   Drainage Description Serosanguineous 07/07/20 0805   Treatments Cleansed;Topical Lidocaine;Provider debridement 07/07/20 0805   Wound Cleansing Normal Saline Irrigation 07/07/20 0805   Periwound Protectant Skin Protectant Wipes to Periwound 07/07/20 0805   Dressing Cleansing/Solutions Not Applicable 06/16/20 0800   Dressing Options Hydrofiber Silver;Silicone Adhesive Foam;Other (Comments) 07/07/20 0805   Dressing Changed Changed 07/07/20 0805   Dressing Status Dry;Intact;Clean 06/16/20 0800   Dressing Change/Treatment Frequency Every 72 hrs, and As Needed 06/16/20 0800   Number of Sutures Removed 2 06/02/20 0932   Non-staged Wound Description Full thickness 07/07/20 0805   Wound Length (cm) 1 cm 07/07/20 0805   Wound Width (cm) 0.7 cm 07/07/20 0805   Wound Depth (cm) 0.2 cm 07/07/20 0805   Wound Surface Area (cm^2) 0.7 cm^2 07/07/20 0805   Wound Volume (cm^3) 0.14 cm^3 07/07/20 0805   Post-Procedure Length (cm) 1.3 cm 07/07/20 0805   Post-Procedure Width (cm) 0.7 cm 07/07/20 0805    Post-Procedure Depth (cm) 0.3 cm 20   Post-Procedure Surface Area (cm^2) 0.91 cm^2 20   Post-Procedure Volume (cm^3) 0.27 cm^3 20   Wound Healing % 96 20   Wound Bed Epithelium (%) 0 % 20   Wound Bed Slough (%) 85 % 20   Wound Bed Eschar (%) 0 % 20   Tunneling (cm) 0 cm 20   Tunneling Clock Position of Wound 12 20   Undermining (cm) 0 cm 20   Undermining of Wound, 1st Location From 3 o'clock;To 4 o'clock 20   Wound Odor None 20   Pulses 2+;Left;DP;PT;Doppler 20   Exposed Structures None 20          PROCEDURE: Excisional debridement of left Achilles wound  -2% viscous lidocaine applied topically to wound bed for approximately 5 minutes prior to debridement  -Curette used to debride wound bed.  Excisional debridement was performed to remove devitalized tissue until healthy, bleeding tissue was visualized.   Entire surface of wound, 0.91 cm2 debrided.  Tissue debrided into the subcutaneous layer.    -Bleeding controlled with manual pressure.    -Wound care completed by wound RN, refer to flowsheet  -Patient tolerated the procedure well, without c/o pain or discomfort.     Pertinent Labs and Diagnostics    IMAGIN/15/18  FINDINGS:  Intramedullary sarah traverses a healed distal tibial diaphyseal fracture. There is an angulated healed distal fibular diaphyseal fracture. No acute fracture or dislocation is seen. Bones are demineralized. There is mild soft tissue swelling. There is   spurring of the calcaneus at the insertion of the Achilles tendon. Atherosclerotic calcification is seen.     IMPRESSION:     Postsurgical and old posttraumatic changes of the distal tibia.     Healed angulated fracture of the distal fibula diaphysis.     Mild soft tissue swelling.     Demineralization.     Arterial calcification    VASCULAR STUDIES: pending    LAST  WOUND CULTURE:   Date:  4/9/2020   Culture Result  Abnormal     Bacteroides vulgatus   Light growth      Culture Result  Abnormal     Pasteurella multocida   Moderate growth     Culture Result  Abnormal     Viridans Streptococcus   Heavy growth     Culture Result  Abnormal     Diphtheroids   Moderate growth            ASSESSMENT AND PLAN:     1.  Dehiscence of operative wound, subsequent encounter  Comments: Spontaneous opening of the wound over old wound site.  Patient has history of Achilles rupture and repair, along with open wound from 34 years ago.  Patient underwent surgical I&D, ACell application, and closure of wound with incisional VAC on 4/9/2020.  Incision dehisced postoperatively.    7/7/2020: Area continues to decrease steadily.  -Excisional debridement of wound in clinic today, medically necessary to promote wound healing.  -Patient to return to clinic weekly for assessment and debridement  -Patient to change dressing 1-2 times per week in between clinic visits  -Wound seems to be progressing slowly but we will consider wound VAC or ACell if wound stalls or does not progress in a steady manner.    Wound care: Silver Hydrofiber and adhesive foam dressing for reduction and bioburden and for exudate absorption.    2.  Pain associated with wound  -2% viscous lidocaine applied topically to wound bed for approximately 5 minutes prior to debridement  -Patient tolerated procedure today with no complaints of discomfort.      PATIENT EDUCATION    -Advised to go to ER for any increased redness, swelling, drainage, or odor, or if patient develops fever, chills, nausea or vomiting.           Please note that this note may have been created using voice recognition software. I have worked with technical experts from [employer] to optimize the interface.  I have made every reasonable attempt to correct obvious errors, but there may be errors of grammar and possibly content that I did not discover before finalizing the note.

## 2020-07-14 ENCOUNTER — OFFICE VISIT (OUTPATIENT)
Dept: WOUND CARE | Facility: MEDICAL CENTER | Age: 70
End: 2020-07-14
Attending: ORTHOPAEDIC SURGERY
Payer: COMMERCIAL

## 2020-07-14 VITALS
RESPIRATION RATE: 18 BRPM | HEART RATE: 60 BPM | OXYGEN SATURATION: 97 % | DIASTOLIC BLOOD PRESSURE: 81 MMHG | SYSTOLIC BLOOD PRESSURE: 129 MMHG | TEMPERATURE: 97.7 F

## 2020-07-14 DIAGNOSIS — T81.31XD DEHISCENCE OF OPERATIVE WOUND, SUBSEQUENT ENCOUNTER: ICD-10-CM

## 2020-07-14 DIAGNOSIS — T14.8XXA PAIN ASSOCIATED WITH WOUND: ICD-10-CM

## 2020-07-14 DIAGNOSIS — R52 PAIN ASSOCIATED WITH WOUND: ICD-10-CM

## 2020-07-14 PROCEDURE — 11042 DBRDMT SUBQ TIS 1ST 20SQCM/<: CPT

## 2020-07-14 PROCEDURE — 11042 DBRDMT SUBQ TIS 1ST 20SQCM/<: CPT | Performed by: NURSE PRACTITIONER

## 2020-07-14 ASSESSMENT — ENCOUNTER SYMPTOMS
MYALGIAS: 0
VOMITING: 0
DIARRHEA: 0
CHILLS: 0
CLAUDICATION: 0
SHORTNESS OF BREATH: 0
FEVER: 0
NERVOUS/ANXIOUS: 0
COUGH: 0
DEPRESSION: 0
NAUSEA: 0
CONSTIPATION: 0

## 2020-07-14 NOTE — PATIENT INSTRUCTIONS
-Keep your wound dressing clean, dry, and intact.    -Change your dressing if it becomes soiled, soaked, or falls off.    -Should you experience any significant changes in your wound(s), such as infection (redness, swelling, localized heat, increased pain, fever > 101 F, chills) or have any questions regarding your home care instructions, please contact the wound center at [phone]. If after hours, contact your primary care physician or go to the hospital emergency room.

## 2020-07-14 NOTE — PROGRESS NOTES
Provider Encounter- Full Thickness wound    HISTORY OF PRESENT ILLNESS  Wound History:    START OF CARE IN CLINIC: 6/2/2020 (return to clinic after surgery)    REFERRING PROVIDER: Kal Huynh MD     WOUND- Full Thickness Wound   LOCATION: Left Achilles tendon.   HISTORY:  Mr. Clifton is an otherwise healthy man who presented to the clinic with a full-thickness wound to his Achilles, over site of remote injury, which recurred spontaneously.  He has a history of several orthopedic surgeries, over 10-20 years ago and has tibial sarah as well as a healed fibula fracture.  He has had no problems with these injuries recently, but noticed a new concerning area in late January early February of this year, that became a full thickness wound.   He was seen by orthopedic surgeon, Dr. Kal Huynh, who recommended an MRI and wound care.   Patient was then taken to surgery by Dr. Huynh on 4/9/2020 for an I&D, application of ACell, wound closure and incisional VAC placement.  Patient called clinic the following day with concerns about the VAC.  VAC had been alarming since the previous night, and neither he or his wife know how to troubleshoot.  He was brought back into the clinic on 4/10 for evaluation and to resume care.  During his clinic visit on 4/15, he was noted to be quite jaundiced.  He was hospitalized the following day, Bactrim was determined to be the probable cause of patient's jaundice.  He was discharged home on 4/17, and chose to treat the wound himself by applying silver Hydrofiber during the day and leaving open to air at night.  During his follow-up with Dr. Huynh, he was referred back to [facility] to resume care of this wound..         Pertinent Medical History: none     TOBACCO USE: none    Patient's problem list, allergies, and current medications reviewed and updated in Epic    Interval History:  6/11/2020 : Clinic visit with ARIANA Beltrán.  Burton states he is feeling well,denies fevers, chills, nausea,  "vomiting, cough or shortness of breath.  He has had a little bit of strikethrough on his outer dressing, but states \"not much boat.    6/16/2020: Clinic visit with ARIANA Lucas. Patient states that they are feeling well today.  Patient denies fever, chills, nausea, vomiting, lightheadedness, dizziness, shortness of breath and chest pain.  Granulation tissue forming to base of wound wound is smaller in width.      6/23/2020 : Clinic visit with ARIANA Beltrán.   Burton states he is feeling well,denies fevers, chills, nausea, vomiting, cough or shortness of breath.  He reports very little drainage from his wounds this week.  He has not been wearing his compression consistently.    6/30/2020 : Clinic visit with ARIANA Beltrán.  Burton is feeling well today,denies fevers, chills, nausea, vomiting, cough or shortness of breath.  States he had to change his dressing twice over the past week, reports very little drainage.    7/7/2020 : Clinic visit with ARIANA Beltrán.  Burton continues to feel well,denies fevers, chills, nausea, vomiting, cough or shortness of breath.  There has not been much drainage from the wound, he had to change dressing once over the past week.    7/14/2020 : Clinic visit with ARIANA Beltrán.  Burton continues to feel well,denies fevers, chills, nausea, vomiting, cough or shortness of breath.  He reports very little drainage from his wound over the past week.  He was seen by Dr. Huynh yesterday, who is pleased with his progress.  He has finished physical therapy.       REVIEW OF SYSTEMS:   Review of Systems   Constitutional: Negative for chills and fever.   Respiratory: Negative for cough and shortness of breath.    Cardiovascular: Negative for chest pain, claudication and leg swelling.   Gastrointestinal: Negative for constipation, diarrhea, nausea and vomiting.   Genitourinary: Negative for dysuria.   Musculoskeletal: Negative for myalgias.   Skin: Negative for itching and rash. "   Psychiatric/Behavioral: Negative for depression. The patient is not nervous/anxious.    All other systems reviewed and are negative.      PHYSICAL EXAMINATION:   /81   Pulse 60   Temp 36.5 °C (97.7 °F)   Resp 18   SpO2 97%   Physical Exam   Constitutional: He is oriented to person, place, and time and well-developed, well-nourished, and in no distress.   HENT:   Head: Normocephalic and atraumatic.   Eyes: Pupils are equal, round, and reactive to light. Conjunctivae are normal.   Neck: Neck supple.   Cardiovascular: Normal rate and intact distal pulses.   Pulmonary/Chest: Effort normal. No respiratory distress.   Musculoskeletal:         General: No edema.   Neurological: He is alert and oriented to person, place, and time.   Skin: Skin is warm.   Full-thickness wound to left Achilles, dehisced surgical site  Refer to wound flowsheet and photos   Psychiatric: Mood, memory, affect and judgment normal.       WOUND ASSESSMENT        Wound 04/16/20 Incision Ankle Left Achilles (Active)   Wound Image    07/14/20 0800   Site Assessment Pink;Red;Yellow 07/14/20 0800   Periwound Assessment Dry;Edema;Scar tissue 07/14/20 0800   Margins Attached edges 07/14/20 0800   Closure Secondary intention 07/14/20 0800   Drainage Amount Small 07/14/20 0800   Drainage Description Serosanguineous 07/14/20 0800   Treatments Cleansed;Topical Lidocaine;Provider debridement 07/14/20 0800   Wound Cleansing Normal Saline Irrigation 07/14/20 0800   Periwound Protectant Skin Protectant Wipes to Periwound;Barrier Paste 07/14/20 0800   Dressing Cleansing/Solutions Not Applicable 07/14/20 0800   Dressing Options Hydrofiber Silver;Silicone Adhesive Foam;Tubigrip 07/14/20 0800   Dressing Changed Changed 07/14/20 0800   Dressing Status Clean;Dry;Intact 07/14/20 0800   Dressing Change/Treatment Frequency Every 72 hrs, and As Needed 07/14/20 0800   Number of Sutures Removed 2 06/02/20 0932   Non-staged Wound Description Full thickness 07/14/20  08   Wound Length (cm) 0.7 cm 20 08   Wound Width (cm) 0.3 cm 20   Wound Depth (cm) 0.1 cm 20   Wound Surface Area (cm^2) 0.21 cm^2 20 08   Wound Volume (cm^3) 0.02 cm^3 20 08   Post-Procedure Length (cm) 0.9 cm 20   Post-Procedure Width (cm) 0.3 cm 20   Post-Procedure Depth (cm) 0.2 cm 20   Post-Procedure Surface Area (cm^2) 0.27 cm^2 20   Post-Procedure Volume (cm^3) 0.05 cm^3 20   Wound Healing % 99 20   Wound Bed Epithelium (%) 0 % 20   Wound Bed Slough (%) 60 % 20   Wound Bed Eschar (%) 0 % 20   Tunneling (cm) 0 cm 20   Tunneling Clock Position of Wound 12 20 0932   Undermining (cm) 0 cm 20   Undermining of Wound, 1st Location From 3 o'clock;To 4 o'clock 20 0815   Wound Odor None 20   Pulses 2+;Left;DP;PT;Doppler 20 0932   Exposed Structures None 20               PROCEDURE: Excisional debridement of left Achilles wound  -2% viscous lidocaine applied topically to wound bed for approximately 5 minutes prior to debridement  -Curette used to debride wound bed.  Excisional debridement was performed to remove devitalized tissue until healthy, bleeding tissue was visualized.   Entire surface of wound, 0.27 cm2 debrided.  Tissue debrided into the subcutaneous layer.    -Bleeding controlled with manual pressure.    -Wound care completed by wound RN, refer to flowsheet  -Patient tolerated the procedure well, without c/o pain or discomfort.     Pertinent Labs and Diagnostics    IMAGIN/15/18  FINDINGS:  Intramedullary sarah traverses a healed distal tibial diaphyseal fracture. There is an angulated healed distal fibular diaphyseal fracture. No acute fracture or dislocation is seen. Bones are demineralized. There is mild soft tissue swelling. There is   spurring of the calcaneus at the insertion of the Achilles  tendon. Atherosclerotic calcification is seen.     IMPRESSION:     Postsurgical and old posttraumatic changes of the distal tibia.     Healed angulated fracture of the distal fibula diaphysis.     Mild soft tissue swelling.     Demineralization.     Arterial calcification    VASCULAR STUDIES: pending    LAST  WOUND CULTURE:   Date: 4/9/2020   Culture Result  Abnormal     Bacteroides vulgatus   Light growth      Culture Result  Abnormal     Pasteurella multocida   Moderate growth     Culture Result  Abnormal     Viridans Streptococcus   Heavy growth     Culture Result  Abnormal     Diphtheroids   Moderate growth            ASSESSMENT AND PLAN:     1.  Dehiscence of operative wound, subsequent encounter  Comments: Spontaneous opening of the wound over old wound site.  Patient has history of Achilles rupture and repair, along with open wound from 34 years ago.  Patient underwent surgical I&D, ACell application, and closure of wound with incisional VAC on 4/9/2020.  Incision dehisced postoperatively.    7/14/2020: Area continues to decrease steadily.  He was seen by Dr. Huynh yesterday, no need for further surgery.  -Excisional debridement of wound in clinic today, medically necessary to promote wound healing.  -Patient to return to clinic weekly for assessment and debridement  -Patient to change dressing 1-2 times per week in between clinic visits  -Anticipate full resolution of this wound within 7 to 14 days.    Wound care: Silver Hydrofiber and adhesive foam dressing for reduction and bioburden and for exudate absorption.    2.  Pain associated with wound  -2% viscous lidocaine applied topically to wound bed for approximately 5 minutes prior to debridement  -Patient tolerated procedure today with no complaints of discomfort.      PATIENT EDUCATION    -Advised to go to ER for any increased redness, swelling, drainage, or odor, or if patient develops fever, chills, nausea or vomiting.           Please note that this  note may have been created using voice recognition software. I have worked with technical experts from [employer] to optimize the interface.  I have made every reasonable attempt to correct obvious errors, but there may be errors of grammar and possibly content that I did not discover before finalizing the note.

## 2020-07-21 ENCOUNTER — OFFICE VISIT (OUTPATIENT)
Dept: WOUND CARE | Facility: MEDICAL CENTER | Age: 70
End: 2020-07-21
Attending: ORTHOPAEDIC SURGERY
Payer: COMMERCIAL

## 2020-07-21 VITALS
SYSTOLIC BLOOD PRESSURE: 131 MMHG | DIASTOLIC BLOOD PRESSURE: 85 MMHG | RESPIRATION RATE: 16 BRPM | HEART RATE: 75 BPM | TEMPERATURE: 97.5 F | OXYGEN SATURATION: 97 %

## 2020-07-21 DIAGNOSIS — T81.31XD DEHISCENCE OF OPERATIVE WOUND, SUBSEQUENT ENCOUNTER: ICD-10-CM

## 2020-07-21 DIAGNOSIS — T14.8XXA PAIN ASSOCIATED WITH WOUND: ICD-10-CM

## 2020-07-21 DIAGNOSIS — R52 PAIN ASSOCIATED WITH WOUND: ICD-10-CM

## 2020-07-21 PROCEDURE — 11042 DBRDMT SUBQ TIS 1ST 20SQCM/<: CPT

## 2020-07-21 PROCEDURE — 11042 DBRDMT SUBQ TIS 1ST 20SQCM/<: CPT | Performed by: NURSE PRACTITIONER

## 2020-07-21 ASSESSMENT — ENCOUNTER SYMPTOMS
CONSTIPATION: 0
CHILLS: 0
SHORTNESS OF BREATH: 0
DEPRESSION: 0
DIARRHEA: 0
VOMITING: 0
FEVER: 0
NAUSEA: 0
CLAUDICATION: 0
NERVOUS/ANXIOUS: 0
COUGH: 0
MYALGIAS: 0

## 2020-07-21 ASSESSMENT — PAIN SCALES - GENERAL: PAINLEVEL: NO PAIN

## 2020-07-21 NOTE — PROGRESS NOTES
Provider Encounter- Full Thickness wound    HISTORY OF PRESENT ILLNESS  Wound History:    START OF CARE IN CLINIC: 6/2/2020 (return to clinic after surgery)    REFERRING PROVIDER: Kal Huynh MD     WOUND- Full Thickness Wound   LOCATION: Left Achilles tendon.   HISTORY:  Mr. Clifton is an otherwise healthy man who presented to the clinic with a full-thickness wound to his Achilles, over site of remote injury, which recurred spontaneously.  He has a history of several orthopedic surgeries, over 10-20 years ago and has tibial sarah as well as a healed fibula fracture.  He has had no problems with these injuries recently, but noticed a new concerning area in late January early February of this year, that became a full thickness wound.   He was seen by orthopedic surgeon, Dr. Kal Huynh, who recommended an MRI and wound care.   Patient was then taken to surgery by Dr. Huynh on 4/9/2020 for an I&D, application of ACell, wound closure and incisional VAC placement.  Patient called clinic the following day with concerns about the VAC.  VAC had been alarming since the previous night, and neither he or his wife know how to troubleshoot.  He was brought back into the clinic on 4/10 for evaluation and to resume care.  During his clinic visit on 4/15, he was noted to be quite jaundiced.  He was hospitalized the following day, Bactrim was determined to be the probable cause of patient's jaundice.  He was discharged home on 4/17, and chose to treat the wound himself by applying silver Hydrofiber during the day and leaving open to air at night.  During his follow-up with Dr. Huynh, he was referred back to [facility] to resume care of this wound..         Pertinent Medical History: none     TOBACCO USE: none    Patient's problem list, allergies, and current medications reviewed and updated in Epic    Interval History:  6/11/2020 : Clinic visit with ARIANA Beltrán.  Burton states he is feeling well,denies fevers, chills, nausea,  "vomiting, cough or shortness of breath.  He has had a little bit of strikethrough on his outer dressing, but states \"not much boat.    6/16/2020: Clinic visit with ARIANA Lucas. Patient states that they are feeling well today.  Patient denies fever, chills, nausea, vomiting, lightheadedness, dizziness, shortness of breath and chest pain.  Granulation tissue forming to base of wound wound is smaller in width.      6/23/2020 : Clinic visit with ARIANA Beltrán.   Burton states he is feeling well,denies fevers, chills, nausea, vomiting, cough or shortness of breath.  He reports very little drainage from his wounds this week.  He has not been wearing his compression consistently.    6/30/2020 : Clinic visit with ARIANA Beltrán.  Burton is feeling well today,denies fevers, chills, nausea, vomiting, cough or shortness of breath.  States he had to change his dressing twice over the past week, reports very little drainage.    7/7/2020 : Clinic visit with ARIANA Beltrán.  Burton continues to feel well,denies fevers, chills, nausea, vomiting, cough or shortness of breath.  There has not been much drainage from the wound, he had to change dressing once over the past week.    7/14/2020 : Clinic visit with ARIANA Beltrán.  Burton continues to feel well,denies fevers, chills, nausea, vomiting, cough or shortness of breath.  He reports very little drainage from his wound over the past week.  He was seen by Dr. Huynh yesterday, who is pleased with his progress.  He has finished physical therapy.     7/21/2020 : Clinic visit with ARIANA Beltrán.  Burton states he is feeling well today, denies fevers, chills, nausea, vomiting, cough or shortness of breath.  He was seen by his surgeon last week, Dr. Huynh.  Dr. Huynh is satisfied with progress of his wounds, recommended he purchase compression stockings.      REVIEW OF SYSTEMS:   Review of Systems   Constitutional: Negative for chills and fever.   Respiratory: " Negative for cough and shortness of breath.    Cardiovascular: Negative for chest pain, claudication and leg swelling.   Gastrointestinal: Negative for constipation, diarrhea, nausea and vomiting.   Genitourinary: Negative for dysuria.   Musculoskeletal: Negative for myalgias.   Skin: Negative for itching and rash.   Psychiatric/Behavioral: Negative for depression. The patient is not nervous/anxious.    All other systems reviewed and are negative.      PHYSICAL EXAMINATION:   /85 (Patient Position: Sitting, BP Cuff Size: Adult)   Pulse 75   Temp 36.4 °C (97.5 °F) (Temporal)   Resp 16   SpO2 97%   Physical Exam   Constitutional: He is oriented to person, place, and time and well-developed, well-nourished, and in no distress.   HENT:   Head: Normocephalic and atraumatic.   Eyes: Pupils are equal, round, and reactive to light. Conjunctivae are normal.   Neck: Neck supple.   Cardiovascular: Normal rate and intact distal pulses.   Pulmonary/Chest: Effort normal. No respiratory distress.   Musculoskeletal:         General: No edema.   Neurological: He is alert and oriented to person, place, and time.   Skin: Skin is warm.   Full-thickness wound to left Achilles, dehisced surgical site  Refer to wound flowsheet and photos   Psychiatric: Mood, memory, affect and judgment normal.       WOUND ASSESSMENT           Wound 04/16/20 Incision Ankle Left Achilles (Active)   Wound Image   07/21/20 0908   Site Assessment Pink;Red;Yellow;Other (Comment) 07/21/20 0908   Periwound Assessment Edema;Scar tissue 07/21/20 0908   Margins Attached edges 07/21/20 0908   Closure Secondary intention 07/21/20 0908   Drainage Amount Small 07/21/20 0908   Drainage Description Serosanguineous 07/21/20 0908   Treatments Cleansed;Provider debridement;Topical Lidocaine 07/21/20 0908   Wound Cleansing Normal Saline Irrigation 07/21/20 0908   Periwound Protectant Skin Protectant Wipes to Periwound;Barrier Paste 07/21/20 0908   Dressing  Cleansing/Solutions Not Applicable 07/21/20 0908   Dressing Options Hydrofiber Silver;Silicone Adhesive Foam;Tubigrip 07/21/20 0908   Dressing Changed Changed 07/21/20 0908   Dressing Status Clean;Dry;Intact 07/21/20 0908   Dressing Change/Treatment Frequency Weekly, and As Needed 07/21/20 0908   Number of Sutures Removed 2 06/02/20 0932   Non-staged Wound Description Full thickness 07/21/20 0908   Wound Length (cm) 0.4 cm 07/21/20 0908   Wound Width (cm) 0.01 cm 07/21/20 0908   Wound Depth (cm) 0.1 cm 07/21/20 0908   Wound Surface Area (cm^2) 0 cm^2 07/21/20 0908   Wound Volume (cm^3) 0 cm^3 07/21/20 0908   Post-Procedure Length (cm) 0.7 cm 07/21/20 0908   Post-Procedure Width (cm) 0.2 cm 07/21/20 0908   Post-Procedure Depth (cm) 0.1 cm 07/21/20 0908   Post-Procedure Surface Area (cm^2) 0.14 cm^2 07/21/20 0908   Post-Procedure Volume (cm^3) 0.01 cm^3 07/21/20 0908   Wound Healing % 100 07/21/20 0908   Wound Bed Granulation (%) 60 % 07/21/20 0908   Wound Bed Epithelium (%) 0 % 07/14/20 0800   Wound Bed Slough (%) 40 % 07/21/20 0908   Wound Bed Eschar (%) 0 % 07/14/20 0800   Wound Bed Granulation (%) - Post-Procedure 100 % 07/21/20 0908   Wound Bed Slough % - (Post-Procedure) 0 % 07/21/20 0908   Tunneling (cm) 0 cm 07/21/20 0908   Tunneling Clock Position of Wound 12 06/02/20 0932   Undermining (cm) 0 cm 07/21/20 0908   Undermining of Wound, 1st Location From 3 o'clock;To 4 o'clock 06/23/20 0815   Wound Odor None 07/21/20 0908   Pulses 2+;Left;DP;PT;Doppler 06/02/20 0932   Exposed Structures None 07/21/20 0908                    PROCEDURE: Excisional debridement of left Achilles wound  -2% viscous lidocaine applied topically to wound bed for approximately 5 minutes prior to debridement  -Curette used to debride wound bed.  Excisional debridement was performed to remove devitalized tissue until healthy, bleeding tissue was visualized.   Entire surface of wound, 0.14 cm2 debrided.  Tissue debrided into the subcutaneous  layer.    -Bleeding controlled with manual pressure.    -Wound care completed by wound RN, refer to flowsheet  -Patient tolerated the procedure well, without c/o pain or discomfort.     Pertinent Labs and Diagnostics    IMAGIN/15/18  FINDINGS:  Intramedullary sarah traverses a healed distal tibial diaphyseal fracture. There is an angulated healed distal fibular diaphyseal fracture. No acute fracture or dislocation is seen. Bones are demineralized. There is mild soft tissue swelling. There is   spurring of the calcaneus at the insertion of the Achilles tendon. Atherosclerotic calcification is seen.     IMPRESSION:     Postsurgical and old posttraumatic changes of the distal tibia.     Healed angulated fracture of the distal fibula diaphysis.     Mild soft tissue swelling.     Demineralization.     Arterial calcification    VASCULAR STUDIES: pending    LAST  WOUND CULTURE:   Date: 2020   Culture Result  Abnormal     Bacteroides vulgatus   Light growth      Culture Result  Abnormal     Pasteurella multocida   Moderate growth     Culture Result  Abnormal     Viridans Streptococcus   Heavy growth     Culture Result  Abnormal     Diphtheroids   Moderate growth            ASSESSMENT AND PLAN:     1.  Dehiscence of operative wound, subsequent encounter  Comments: Spontaneous opening of the wound over old wound site.  Patient has history of Achilles rupture and repair, along with open wound from 34 years ago.  Patient underwent surgical I&D, ACell application, and closure of wound with incisional VAC on 2020.  Incision dehisced postoperatively.    2020: Area continues to decrease steadily.    -Patient to return to clinic weekly for assessment and debridement  -Patient to change dressing 1-2 times per week in between clinic visits  -Anticipate full resolution of this wound within 7 to 14 days.    Wound care: Silver Hydrofiber and adhesive foam dressing for reduction and bioburden and for exudate  absorption.    2.  Pain associated with wound  -2% viscous lidocaine applied topically to wound bed for approximately 5 minutes prior to debridement  -Patient tolerated procedure today with no complaints of discomfort.      PATIENT EDUCATION    -Advised to go to ER for any increased redness, swelling, drainage, or odor, or if patient develops fever, chills, nausea or vomiting.           Please note that this note may have been created using voice recognition software. I have worked with technical experts from [employer] to optimize the interface.  I have made every reasonable attempt to correct obvious errors, but there may be errors of grammar and possibly content that I did not discover before finalizing the note.

## 2020-07-21 NOTE — PATIENT INSTRUCTIONS
Avoid prolonged standing or sitting without elevating your legs.  - Apply tubigrip to your legs ending 2 fingers below back of knee without wrinkles.   Only remove if temperature or sensation changes.   If compression needs to be removed, un-wrap it do not cut it off.     Should you experience any significant changes in your wound(s), such as infection (redness, swelling, localized heat, increased pain, fever > 101 F, chills) or have any questions regarding your home care instructions, please contact the wound center at [phone]. If after hours, contact your primary care physician or go to the hospital emergency room.   Keep dressing clean, dry and covered while bathing. Only change dressing if it becomes over saturated, soiled or falls off.   Use the print out provided to you to measure for [facility] direct to measure for ordering your compression stockings of 15-20 mmHg or 20-30 mmHg.

## 2020-07-28 ENCOUNTER — OFFICE VISIT (OUTPATIENT)
Dept: WOUND CARE | Facility: MEDICAL CENTER | Age: 70
End: 2020-07-28
Attending: ORTHOPAEDIC SURGERY
Payer: COMMERCIAL

## 2020-07-28 VITALS
OXYGEN SATURATION: 94 % | RESPIRATION RATE: 16 BRPM | TEMPERATURE: 97 F | SYSTOLIC BLOOD PRESSURE: 115 MMHG | DIASTOLIC BLOOD PRESSURE: 75 MMHG | HEART RATE: 72 BPM

## 2020-07-28 DIAGNOSIS — R52 PAIN ASSOCIATED WITH WOUND: ICD-10-CM

## 2020-07-28 DIAGNOSIS — T14.8XXA PAIN ASSOCIATED WITH WOUND: ICD-10-CM

## 2020-07-28 DIAGNOSIS — T81.31XD DEHISCENCE OF OPERATIVE WOUND, SUBSEQUENT ENCOUNTER: ICD-10-CM

## 2020-07-28 PROCEDURE — 11042 DBRDMT SUBQ TIS 1ST 20SQCM/<: CPT

## 2020-07-28 PROCEDURE — 11042 DBRDMT SUBQ TIS 1ST 20SQCM/<: CPT | Performed by: NURSE PRACTITIONER

## 2020-07-28 ASSESSMENT — ENCOUNTER SYMPTOMS
NERVOUS/ANXIOUS: 0
CHILLS: 0
MYALGIAS: 0
FEVER: 0
NAUSEA: 0
COUGH: 0
VOMITING: 0
DIARRHEA: 0
CONSTIPATION: 0
CLAUDICATION: 0
SHORTNESS OF BREATH: 0
DEPRESSION: 0

## 2020-07-28 NOTE — PATIENT INSTRUCTIONS
-Keep dressings clean, dry and covered while bathing. Only change dressings if they become over saturated, soiled or fall off.     -Avoid prolonged standing or sitting without elevating your legs.    -Remove your compression garments if you have severe pain, severe swelling, numbness, color change, or temperature change in your toes. If you need to remove your compression garments, do so by unrolling them. Do not cut the compression garments off, this is to prevent cutting yourself on accident.    -Should you experience any significant changes in your wound(s), such as infection (redness, swelling, localized heat, increased pain, fever > 101 F, chills) or have any questions regarding your home care instructions, please contact the wound center at [phone]. If after hours, contact your primary care physician or go to the hospital emergency room.

## 2020-07-28 NOTE — PROGRESS NOTES
Provider Encounter- Full Thickness wound    HISTORY OF PRESENT ILLNESS  Wound History:    START OF CARE IN CLINIC: 6/2/2020 (return to clinic after surgery)    REFERRING PROVIDER: Kal Huynh MD     WOUND- Full Thickness Wound   LOCATION: Left Achilles tendon.   HISTORY:  Mr. Clifton is an otherwise healthy man who presented to the clinic with a full-thickness wound to his Achilles, over site of remote injury, which recurred spontaneously.  He has a history of several orthopedic surgeries, over 10-20 years ago and has tibial sarah as well as a healed fibula fracture.  He has had no problems with these injuries recently, but noticed a new concerning area in late January early February of this year, that became a full thickness wound.   He was seen by orthopedic surgeon, Dr. Kal Huynh, who recommended an MRI and wound care.   Patient was then taken to surgery by Dr. Huynh on 4/9/2020 for an I&D, application of ACell, wound closure and incisional VAC placement.  Patient called clinic the following day with concerns about the VAC.  VAC had been alarming since the previous night, and neither he or his wife know how to troubleshoot.  He was brought back into the clinic on 4/10 for evaluation and to resume care.  During his clinic visit on 4/15, he was noted to be quite jaundiced.  He was hospitalized the following day, Bactrim was determined to be the probable cause of patient's jaundice.  He was discharged home on 4/17, and chose to treat the wound himself by applying silver Hydrofiber during the day and leaving open to air at night.  During his follow-up with Dr. Huynh, he was referred back to [facility] to resume care of this wound..         Pertinent Medical History: none     TOBACCO USE: none    Patient's problem list, allergies, and current medications reviewed and updated in Epic    Interval History:  6/11/2020 : Clinic visit with ARIANA Beltrán.  Burton states he is feeling well,denies fevers, chills, nausea,  "vomiting, cough or shortness of breath.  He has had a little bit of strikethrough on his outer dressing, but states \"not much boat.    6/16/2020: Clinic visit with ARIANA Lucas. Patient states that they are feeling well today.  Patient denies fever, chills, nausea, vomiting, lightheadedness, dizziness, shortness of breath and chest pain.  Granulation tissue forming to base of wound wound is smaller in width.      6/23/2020 : Clinic visit with ARIANA Beltrán.   Burton states he is feeling well,denies fevers, chills, nausea, vomiting, cough or shortness of breath.  He reports very little drainage from his wounds this week.  He has not been wearing his compression consistently.    6/30/2020 : Clinic visit with ARIANA Beltrán.  Burton is feeling well today,denies fevers, chills, nausea, vomiting, cough or shortness of breath.  States he had to change his dressing twice over the past week, reports very little drainage.    7/7/2020 : Clinic visit with ARIANA Beltrán.  Burton continues to feel well,denies fevers, chills, nausea, vomiting, cough or shortness of breath.  There has not been much drainage from the wound, he had to change dressing once over the past week.    7/14/2020 : Clinic visit with ARIANA Beltrán.  Burton continues to feel well,denies fevers, chills, nausea, vomiting, cough or shortness of breath.  He reports very little drainage from his wound over the past week.  He was seen by Dr. Huynh yesterday, who is pleased with his progress.  He has finished physical therapy.     7/21/2020 : Clinic visit with ARIANA Beltrán.  Burton states he is feeling well today, denies fevers, chills, nausea, vomiting, cough or shortness of breath.  He was seen by his surgeon last week, Dr. Huynh.  Dr. Huynh is satisfied with progress of his wounds, recommended he purchase compression stockings.    7/28/2020 : Clinic visit with ARIANA Beltrán.  Burton states he is feeling well today, denies fevers, " "chills, nausea, vomiting, cough or shortness of breath.  He had very little drainage from his wound, \"a few drops\".      REVIEW OF SYSTEMS:   Review of Systems   Constitutional: Negative for chills and fever.   Respiratory: Negative for cough and shortness of breath.    Cardiovascular: Negative for chest pain, claudication and leg swelling.   Gastrointestinal: Negative for constipation, diarrhea, nausea and vomiting.   Genitourinary: Negative for dysuria.   Musculoskeletal: Negative for myalgias.   Skin: Negative for itching and rash.   Psychiatric/Behavioral: Negative for depression. The patient is not nervous/anxious.    All other systems reviewed and are negative.      PHYSICAL EXAMINATION:   /75   Pulse 72   Temp 36.1 °C (97 °F) (Temporal)   Resp 16   SpO2 94%   Physical Exam   Constitutional: He is oriented to person, place, and time and well-developed, well-nourished, and in no distress.   HENT:   Head: Normocephalic and atraumatic.   Eyes: Pupils are equal, round, and reactive to light. Conjunctivae are normal.   Neck: Neck supple.   Cardiovascular: Normal rate and intact distal pulses.   Pulmonary/Chest: Effort normal. No respiratory distress.   Musculoskeletal:         General: No edema.   Neurological: He is alert and oriented to person, place, and time.   Skin: Skin is warm.   Full-thickness wound to left Achilles, dehisced surgical site  Refer to wound flowsheet and photos   Psychiatric: Mood, memory, affect and judgment normal.       WOUND ASSESSMENT          Wound 04/16/20 Incision Ankle Left -Left Achilles Superior (Active)   Wound Image    07/28/20 0805   Site Assessment Dry;Soto 07/28/20 0805   Periwound Assessment Scar tissue;Edema 07/28/20 0805   Margins Attached edges 07/28/20 0805   Closure Secondary intention 07/21/20 0908   Drainage Amount Scant 07/28/20 0805   Drainage Description Serosanguineous 07/28/20 0805   Treatments Cleansed;Topical Lidocaine;Provider debridement 07/28/20 0805 "   Wound Cleansing Normal Saline Irrigation 07/28/20 0805   Periwound Protectant Skin Protectant Wipes to Periwound 07/28/20 0805   Dressing Cleansing/Solutions Not Applicable 07/21/20 0908   Dressing Options Silicone Adhesive Foam;Tubigrip 07/28/20 0805   Dressing Changed New 07/28/20 0805   Dressing Status Clean;Dry;Intact 07/21/20 0908   Dressing Change/Treatment Frequency Weekly, and As Needed 07/21/20 0908   Number of Sutures Removed 2 06/02/20 0932   Non-staged Wound Description Full thickness 07/28/20 0805   Wound Length (cm) 0.4 cm 07/21/20 0908   Wound Width (cm) 0.01 cm 07/21/20 0908   Wound Depth (cm) 0.1 cm 07/21/20 0908   Wound Surface Area (cm^2) 0 cm^2 07/21/20 0908   Wound Volume (cm^3) 0 cm^3 07/21/20 0908   Post-Procedure Length (cm) 0.2 cm 07/28/20 0805   Post-Procedure Width (cm) 0.2 cm 07/28/20 0805   Post-Procedure Depth (cm) 0.1 cm 07/28/20 0805   Post-Procedure Surface Area (cm^2) 0.04 cm^2 07/28/20 0805   Post-Procedure Volume (cm^3) 0 cm^3 07/28/20 0805   Wound Healing % 100 07/21/20 0908   Wound Bed Granulation (%) 60 % 07/21/20 0908   Wound Bed Epithelium (%) 0 % 07/14/20 0800   Wound Bed Slough (%) 40 % 07/21/20 0908   Wound Bed Eschar (%) 0 % 07/14/20 0800   Wound Bed Granulation (%) - Post-Procedure 100 % 07/21/20 0908   Wound Bed Slough % - (Post-Procedure) 0 % 07/21/20 0908   Tunneling (cm) 0 cm 07/28/20 0805   Tunneling Clock Position of Wound 12 06/02/20 0932   Undermining (cm) 0 cm 07/28/20 0805   Undermining of Wound, 1st Location From 3 o'clock;To 4 o'clock 06/23/20 0815   Wound Odor None 07/28/20 0805   Pulses 2+;Left;DP;PT;Doppler 06/02/20 0932   Exposed Structures None 07/28/20 0805       Wound 07/28/20 Full Thickness Wound Leg Posterior Left -Left Achilles Inferior (Active)   Site Assessment Dry;Soto 07/28/20 0805   Periwound Assessment Scar tissue;Edema 07/28/20 0805   Margins Attached edges 07/28/20 0805   Drainage Amount None 07/28/20 0805   Treatments Cleansed;Topical  Lidocaine;Provider debridement 20   Wound Cleansing Normal Saline Irrigation 20   Periwound Protectant Skin Protectant Wipes to Periwound 20   Dressing Options Silicone Adhesive Foam;Tubigrip 20   Dressing Changed New 20   Post-Procedure Length (cm) 0.2 cm 20   Post-Procedure Width (cm) 0.1 cm 20   Post-Procedure Depth (cm) 0.1 cm 20   Post-Procedure Surface Area (cm^2) 0.02 cm^2 20   Post-Procedure Volume (cm^3) 0 cm^3 20   Tunneling (cm) 0 cm 20   Undermining (cm) 0 cm 20   Wound Odor None 20   Exposed Structures None 20                       PROCEDURE: Excisional debridement of left Achilles wound  -2% viscous lidocaine applied topically to wound bed for approximately 5 minutes prior to debridement  -Forceps used to lift and remove dry crust, curette used to debride wound bed.  Excisional debridement was performed to remove devitalized tissue until healthy, bleeding tissue was visualized.   Entire surface of wound, 0.04 cm2 debrided.  Tissue debrided into the subcutaneous layer.    -Bleeding controlled with manual pressure.    -Wound care completed by wound RN, refer to flowsheet  -Patient tolerated the procedure well, without c/o pain or discomfort.     Pertinent Labs and Diagnostics    IMAGIN/15/18  FINDINGS:  Intramedullary sarah traverses a healed distal tibial diaphyseal fracture. There is an angulated healed distal fibular diaphyseal fracture. No acute fracture or dislocation is seen. Bones are demineralized. There is mild soft tissue swelling. There is   spurring of the calcaneus at the insertion of the Achilles tendon. Atherosclerotic calcification is seen.     IMPRESSION:     Postsurgical and old posttraumatic changes of the distal tibia.     Healed angulated fracture of the distal fibula diaphysis.     Mild soft tissue  swelling.     Demineralization.     Arterial calcification    VASCULAR STUDIES: pending    LAST  WOUND CULTURE:   Date: 4/9/2020   Culture Result  Abnormal     Bacteroides vulgatus   Light growth      Culture Result  Abnormal     Pasteurella multocida   Moderate growth     Culture Result  Abnormal     Viridans Streptococcus   Heavy growth     Culture Result  Abnormal     Diphtheroids   Moderate growth            ASSESSMENT AND PLAN:     1.  Dehiscence of operative wound, subsequent encounter  Comments: Spontaneous opening of the wound over old wound site.  Patient has history of Achilles rupture and repair, along with open wound from 34 years ago.  Patient underwent surgical I&D, ACell application, and closure of wound with incisional VAC on 4/9/2020.  Incision dehisced postoperatively.    7/28/2020: Area continues to decrease steadily.    -Patient to return to clinic weekly for assessment and debridement  -Patient to change dressing 1-2 times per week in between clinic visits  -Anticipate full resolution of this wound within 7 to 14 days.  -Patient advised to cover wound with simple dressing, resume using aquacel AG only if drainage increases.    Wound care:  adhesive foam dressing for reduction and bioburden and for exudate absorption.    2.  Pain associated with wound  -2% viscous lidocaine applied topically to wound bed for approximately 5 minutes prior to debridement  -Patient tolerated procedure today with no complaints of discomfort.      PATIENT EDUCATION    -Advised to go to ER for any increased redness, swelling, drainage, or odor, or if patient develops fever, chills, nausea or vomiting.           Please note that this note may have been created using voice recognition software. I have worked with technical experts from [employer] to optimize the interface.  I have made every reasonable attempt to correct obvious errors, but there may be errors of grammar and possibly content that I did not discover  before finalizing the note.

## 2020-08-11 ENCOUNTER — NON-PROVIDER VISIT (OUTPATIENT)
Dept: WOUND CARE | Facility: MEDICAL CENTER | Age: 70
End: 2020-08-11
Attending: ORTHOPAEDIC SURGERY
Payer: COMMERCIAL

## 2020-08-11 PROCEDURE — 97597 DBRDMT OPN WND 1ST 20 CM/<: CPT

## 2020-08-11 NOTE — PATIENT INSTRUCTIONS
-Keep your wound dressing clean, dry, and intact.    -Change your dressing if it becomes soiled, soaked, or falls off.    -Should you experience any significant changes in your wound(s), such as infection (redness, swelling, localized heat, increased pain, fever > 101 F, chills) or have any questions regarding your home care instructions, please contact the wound center at [phone]. If after hours, contact your primary care physician or go to the hospital emergency room.

## 2020-08-11 NOTE — NON-PROVIDER
CSWD with curette to remove non-viable crust layer over LLE achilles wound of ~1cm2 after application of topical lidocaine.

## 2020-08-18 ENCOUNTER — NON-PROVIDER VISIT (OUTPATIENT)
Dept: WOUND CARE | Facility: MEDICAL CENTER | Age: 70
End: 2020-08-18
Attending: ORTHOPAEDIC SURGERY
Payer: COMMERCIAL

## 2020-08-18 PROCEDURE — 97597 DBRDMT OPN WND 1ST 20 CM/<: CPT

## 2020-08-18 NOTE — PROCEDURES
CSWD with curette to remove non-viable crust layer over LLE achilles wound bed and rancho wound area of ~1-2 cm2 after application of 2% topical lidocaine for a ~5 minute dwell time.

## 2020-08-18 NOTE — PATIENT INSTRUCTIONS
-Keep your wound dressing clean, dry, and intact.    -Change your dressing if it becomes soiled, soaked, or falls off.    -Should you experience any significant changes in your wound(s), such as infection (redness, swelling, localized heat, increased pain, fever > 101 F, chills) or have any questions regarding your home care instructions, please contact the wound center at [phone]. If after hours, contact your primary care physician or go to the hospital emergency room.

## 2020-08-26 ENCOUNTER — NON-PROVIDER VISIT (OUTPATIENT)
Dept: WOUND CARE | Facility: MEDICAL CENTER | Age: 70
End: 2020-08-26
Attending: ORTHOPAEDIC SURGERY
Payer: COMMERCIAL

## 2020-08-26 PROCEDURE — 97597 DBRDMT OPN WND 1ST 20 CM/<: CPT

## 2020-08-26 NOTE — PROCEDURES
CSWD with scalpel, scissors and forceps to remove <0.5 cm2 non viable tissue from wound bed. Patient tolerated well.

## 2020-09-01 ENCOUNTER — NON-PROVIDER VISIT (OUTPATIENT)
Dept: WOUND CARE | Facility: MEDICAL CENTER | Age: 70
End: 2020-09-01
Attending: ORTHOPAEDIC SURGERY
Payer: COMMERCIAL

## 2020-09-01 PROCEDURE — 99211 OFF/OP EST MAY X REQ PHY/QHP: CPT

## 2020-09-01 NOTE — PATIENT INSTRUCTIONS
-Keep your wound dressing clean, dry, and intact.    -Change your dressing if it becomes soiled, soaked, or falls off.    -Should you experience any significant changes in your wound(s), such as infection (redness, swelling, localized heat, increased pain, fever > 101 F, chills) or have any questions regarding your home care instructions, please contact the wound center at [phone]. If after hours, contact your primary care physician or go to the hospital emergency room.

## 2020-09-08 ENCOUNTER — NON-PROVIDER VISIT (OUTPATIENT)
Dept: WOUND CARE | Facility: MEDICAL CENTER | Age: 70
End: 2020-09-08
Attending: ORTHOPAEDIC SURGERY
Payer: COMMERCIAL

## 2020-09-08 PROCEDURE — 97602 WOUND(S) CARE NON-SELECTIVE: CPT

## 2020-09-08 NOTE — PROCEDURES
Non selective with NS and gauze to remove non viable tissue from wound bed. Patient tolerated well.

## 2020-09-08 NOTE — CERTIFICATION
Advanced [facility]ound [facility]   [facility]   [address]   [address]   [place] [address]   [phone] Fax: [phone]    Discharge Note      Referring Physician: Dr. Huynh  Wound Etiology: Full thickness wound   Wound location: Left achilles  ICD-10: L97.329  Date of Discharge: 09/08/2020    Assessment:  Discharge patient at this time secondary to wound resolution. Pt instr dc today, keep area clean, it will be fragile for a few days, bathe and dry area gently, as scar tissue only ever regains a maximum of 80% of the tensile strength of the surrounding skin, remodeling of scar can continue for 6mo - a year. Contact PCP for a referral back here if any problems with area opening and draining again. Pt understands.            Thank you for the referral and the opportunity to treat your patient.      Clinician Signature: _____________________________ Date:_______________

## 2020-10-13 ENCOUNTER — PATIENT MESSAGE (OUTPATIENT)
Dept: MEDICAL GROUP | Facility: PHYSICIAN GROUP | Age: 70
End: 2020-10-13

## 2020-10-13 DIAGNOSIS — N52.8 OTHER MALE ERECTILE DYSFUNCTION: ICD-10-CM

## 2020-10-13 NOTE — TELEPHONE ENCOUNTER
----- Message from Rob Clifton sent at 10/13/2020  4:24 PM PDT -----  Regarding: Prescription Question  Contact: [phone]  Good afternoon. I would like to renew my annual prescription for Viagra. I would like 90 tablets of 100mg. I have contacted [facility] in [place] to supply the prescription. Could you please either fax the prescription to them at  [phone] or send them an electronic prescription.   Please let me know if you need anything else from me. Also, please advise me when the prescription has been sent.   Thanks,   Burton

## 2020-10-14 RX ORDER — SILDENAFIL 100 MG/1
100 TABLET, FILM COATED ORAL PRN
Qty: 90 TAB | Refills: 0 | Status: SHIPPED | OUTPATIENT
Start: 2020-10-14 | End: 2020-10-16

## 2020-10-14 NOTE — TELEPHONE ENCOUNTER
Rx sent to [facility] pharmacy.  Please let patient know.  -Renita Guerra M.D.

## 2020-10-16 DIAGNOSIS — N52.8 OTHER MALE ERECTILE DYSFUNCTION: ICD-10-CM

## 2020-10-16 RX ORDER — SILDENAFIL 100 MG/1
TABLET, FILM COATED ORAL
Qty: 90 TAB | Refills: 0 | Status: SHIPPED | OUTPATIENT
Start: 2020-10-16 | End: 2021-09-29 | Stop reason: SDUPTHER

## 2020-12-07 ENCOUNTER — TELEMEDICINE (OUTPATIENT)
Dept: MEDICAL GROUP | Facility: PHYSICIAN GROUP | Age: 70
End: 2020-12-07
Payer: COMMERCIAL

## 2020-12-07 ENCOUNTER — NON-PROVIDER VISIT (OUTPATIENT)
Dept: MEDICAL GROUP | Facility: PHYSICIAN GROUP | Age: 70
End: 2020-12-07
Payer: COMMERCIAL

## 2020-12-07 VITALS — WEIGHT: 205 LBS | HEIGHT: 71 IN | BODY MASS INDEX: 28.7 KG/M2

## 2020-12-07 DIAGNOSIS — Z13.220 SCREENING FOR LIPID DISORDERS: ICD-10-CM

## 2020-12-07 DIAGNOSIS — Z00.00 ENCOUNTER FOR PREVENTATIVE ADULT HEALTH CARE EXAMINATION: ICD-10-CM

## 2020-12-07 DIAGNOSIS — Z23 NEED FOR IMMUNIZATION AGAINST INFLUENZA: ICD-10-CM

## 2020-12-07 DIAGNOSIS — Z13.1 SCREENING FOR DIABETES MELLITUS: ICD-10-CM

## 2020-12-07 DIAGNOSIS — Z23 IMMUNIZATION DUE: ICD-10-CM

## 2020-12-07 PROBLEM — E87.1 HYPONATREMIA: Status: RESOLVED | Noted: 2020-04-16 | Resolved: 2020-12-07

## 2020-12-07 PROBLEM — R79.89 LFT ELEVATION: Status: RESOLVED | Noted: 2020-04-16 | Resolved: 2020-12-07

## 2020-12-07 PROBLEM — S91.002A WOUND OF LEFT ANKLE: Status: RESOLVED | Noted: 2020-04-16 | Resolved: 2020-12-07

## 2020-12-07 PROCEDURE — 90472 IMMUNIZATION ADMIN EACH ADD: CPT | Performed by: FAMILY MEDICINE

## 2020-12-07 PROCEDURE — 90662 IIV NO PRSV INCREASED AG IM: CPT | Performed by: FAMILY MEDICINE

## 2020-12-07 PROCEDURE — 99397 PER PM REEVAL EST PAT 65+ YR: CPT | Mod: 25,95,CR | Performed by: FAMILY MEDICINE

## 2020-12-07 PROCEDURE — 90750 HZV VACC RECOMBINANT IM: CPT | Performed by: FAMILY MEDICINE

## 2020-12-07 PROCEDURE — 90471 IMMUNIZATION ADMIN: CPT | Performed by: FAMILY MEDICINE

## 2020-12-07 ASSESSMENT — FIBROSIS 4 INDEX: FIB4 SCORE: 1.22

## 2020-12-07 NOTE — PROGRESS NOTES
"Burton Clifton is a 70 y.o. male here for a non-provider visit for:   FLU  SHINGRIX (Shingles)    Reason for immunization: Overdue/Provider Recommended  Immunization records indicate need for vaccine: Yes, confirmed with Epic  Minimum interval has been met for this vaccine: Yes  ABN completed: Not Indicated    Order and dose verified by: Luciano KEENAN Dated  Flu: 8/15/2019 Shingrix: 10/30/2019 was given to patient: Yes  All IAC Questionnaire questions were answered \"No.\"    Patient tolerated injection and no adverse effects were observed or reported: Yes    Pt scheduled for next dose in series: Not Indicated    "

## 2020-12-07 NOTE — PROGRESS NOTES
Subjective:     CC:   Chief Complaint   Patient presents with   • Annual Exam     preventative care      This evaluation was conducted via Zoom using secure and encrypted videoconferencing technology. The patient was in a private location in the state [facility].      The patient's identity was confirmed and verbal consent was obtained for this virtual visit.    HPI:   Rob Clifton is a 70 y.o. male who presents for annual exam.  He was treated for issues with his prior Delia's tendon injury (secondary to trauma) and subsequent wound infection earlier in the year.  He retired on !  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  He has gained ~15 pounds this year during the pandemic.    Last Colorectal Cancer Screenin  Last Tdap:   Received HPV series: Aged out  Hx STDs: No    Exercise: moderate regular exercise, aerobic < 3 days a week (walking, golf, resistance bands)  Diet: healthy     He  has a past medical history of Achilles tendon infection, Arthritis, and Kidney stones.  He  has a past surgical history that includes other orthopedic surgery (Bilateral, ); other orthopedic surgery (Left, ); other orthopedic surgery (, ); and irrigation & debridement ortho (Left, 2020).    Family History   Problem Relation Age of Onset   • No Known Problems Mother    • No Known Problems Father    • Other Maternal Grandmother         Possibly TB   • No Known Problems Maternal Grandfather    • No Known Problems Paternal Grandmother    • No Known Problems Paternal Grandfather    • Heart Disease Neg Hx    • Cancer Neg Hx    • Diabetes Neg Hx    • Stroke Neg Hx      Social History     Tobacco Use   • Smoking status: Never Smoker   • Smokeless tobacco: Never Used   Substance Use Topics   • Alcohol use: Yes     Alcohol/week: 0.0 oz     Frequency: 4 or more times a week     Drinks per session: 1 or 2     Binge frequency: Never     Comment: 1-2   • Drug use: Yes      "Types: Marijuana, Oral, Inhaled     Comment: marijuana, smokes last use 4/5/20     He  reports being sexually active and has had partner(s) who are Female. He reports using the following method of birth control/protection: Post-Menopausal.    Patient Active Problem List    Diagnosis Date Noted   • Murmur 04/16/2020   • Vitamin B12 deficiency 05/31/2018   • Pure hypercholesterolemia 11/17/2017   • History of trauma 11/17/2017   • Arthritis 11/17/2017   • Bunion of great toe 11/17/2017     Current Outpatient Medications   Medication Sig Dispense Refill   • sildenafil citrate (VIAGRA) 100 MG tablet Take 1 tablet by mouth as needed for Erectile Dysfunction. 90 Tab 0   • cyanocobalamin (VITAMIN B-12) 100 MCG Tab Take 100 mcg by mouth every day.     • Naproxen Sodium (ALEVE) 220 MG Cap Take 2 Caps by mouth every day.     • Omega-3 Fatty Acids (FISH OIL) 1200 MG Cap Take 3 Caps by mouth every day.     • Misc Natural Products (LUTEIN 20 PO) Take 20 mg by mouth every day.       No current facility-administered medications for this visit.      Allergies   Allergen Reactions   • Bactrim Ds      Patient states jaundice     Review of Systems   Constitutional: Negative for fever, chills and malaise/fatigue.   HENT: Negative for congestion.    Eyes: Negative for pain.   Respiratory: Negative for cough and shortness of breath.    Cardiovascular: Negative for chest pain and leg swelling.   Gastrointestinal: Negative for nausea, vomiting, abdominal pain and diarrhea.   Genitourinary: Negative for dysuria and hematuria.   Skin: Negative for rash.   Neurological: Negative for dizziness, focal weakness and headaches.   Endo/Heme/Allergies: Does not bruise/bleed easily.   Psychiatric/Behavioral: Negative for depression.  The patient is not nervous/anxious.      Objective:   Ht 1.803 m (5' 11\")   Wt 93 kg (205 lb)   BMI 28.59 kg/m²    Respirations: 16    Wt Readings from Last 4 Encounters:   12/07/20 93 kg (205 lb)   04/16/20 89.9 kg " (198 lb 3.1 oz)   04/09/20 89.6 kg (197 lb 8.5 oz)   02/24/20 90.3 kg (199 lb)     Physical Exam:  Constitutional: Well-developed and well-nourished. Not diaphoretic. No distress.   Skin: Skin is warm and dry. No rash noted.  Head: Atraumatic without lesions.  Eyes: Conjunctivae and extraocular motions are normal. No scleral icterus.   Ears:  External ears unremarkable.   Mouth/Throat: Normal phonation. Moist mucous membranes.  Respiratory: No signs of respiratory distress, no cough.  Neurological: Alert and oriented x 3. Visible exam shows no focal abnormalities.   Psychiatric:  Behavior, mood, and affect are appropriate.    Assessment and Plan:     1. Encounter for preventative adult health care examination  This is a healthy 70-year-old male here today for a preventative exam.  Previous medical history, healthcare maintenance and immunization status reviewed.  Patient is up to date.  Annual labwork ordered.  See discussion of anticipatory guidance below.  Patient will return annually for preventative exams.  - Comp Metabolic Panel; Future  - Lipid Profile; Future    2. Screening for lipid disorders  - Lipid Profile; Future    3. Screening for diabetes mellitus  - Comp Metabolic Panel; Future    Health maintenance:  Due for Flu and Shingrix   Labs per orders  Immunizations per orders  Patient counseled about skin care, diet, supplements, and exercise.  Discussed diet and exercise     Follow-up: shots only visit this week, next wellness visit in 1 year

## 2020-12-09 ENCOUNTER — HOSPITAL ENCOUNTER (OUTPATIENT)
Dept: LAB | Facility: MEDICAL CENTER | Age: 70
End: 2020-12-09
Attending: FAMILY MEDICINE
Payer: COMMERCIAL

## 2020-12-09 DIAGNOSIS — Z13.1 SCREENING FOR DIABETES MELLITUS: ICD-10-CM

## 2020-12-09 DIAGNOSIS — Z13.220 SCREENING FOR LIPID DISORDERS: ICD-10-CM

## 2020-12-09 DIAGNOSIS — Z00.00 ENCOUNTER FOR PREVENTATIVE ADULT HEALTH CARE EXAMINATION: ICD-10-CM

## 2020-12-09 LAB
ALBUMIN SERPL BCP-MCNC: 4.5 G/DL (ref 3.2–4.9)
ALBUMIN/GLOB SERPL: 1.7 G/DL
ALP SERPL-CCNC: 56 U/L (ref 30–99)
ALT SERPL-CCNC: 15 U/L (ref 2–50)
ANION GAP SERPL CALC-SCNC: 10 MMOL/L (ref 7–16)
AST SERPL-CCNC: 18 U/L (ref 12–45)
BILIRUB SERPL-MCNC: 0.7 MG/DL (ref 0.1–1.5)
BUN SERPL-MCNC: 19 MG/DL (ref 8–22)
CALCIUM SERPL-MCNC: 9.4 MG/DL (ref 8.5–10.5)
CHLORIDE SERPL-SCNC: 105 MMOL/L (ref 96–112)
CHOLEST SERPL-MCNC: 214 MG/DL (ref 100–199)
CO2 SERPL-SCNC: 27 MMOL/L (ref 20–33)
CREAT SERPL-MCNC: 1.11 MG/DL (ref 0.5–1.4)
FASTING STATUS PATIENT QL REPORTED: NORMAL
GLOBULIN SER CALC-MCNC: 2.7 G/DL (ref 1.9–3.5)
GLUCOSE SERPL-MCNC: 90 MG/DL (ref 65–99)
HDLC SERPL-MCNC: 61 MG/DL
LDLC SERPL CALC-MCNC: 132 MG/DL
POTASSIUM SERPL-SCNC: 4.6 MMOL/L (ref 3.6–5.5)
PROT SERPL-MCNC: 7.2 G/DL (ref 6–8.2)
SODIUM SERPL-SCNC: 142 MMOL/L (ref 135–145)
TRIGL SERPL-MCNC: 106 MG/DL (ref 0–149)

## 2020-12-09 PROCEDURE — 80061 LIPID PANEL: CPT

## 2020-12-09 PROCEDURE — 36415 COLL VENOUS BLD VENIPUNCTURE: CPT

## 2020-12-09 PROCEDURE — 80053 COMPREHEN METABOLIC PANEL: CPT

## 2020-12-15 ENCOUNTER — OFFICE VISIT (OUTPATIENT)
Dept: MEDICAL GROUP | Facility: PHYSICIAN GROUP | Age: 70
End: 2020-12-15
Payer: COMMERCIAL

## 2020-12-15 VITALS
HEIGHT: 71 IN | TEMPERATURE: 97.9 F | WEIGHT: 208 LBS | SYSTOLIC BLOOD PRESSURE: 115 MMHG | BODY MASS INDEX: 29.12 KG/M2 | RESPIRATION RATE: 15 BRPM | HEART RATE: 88 BPM | DIASTOLIC BLOOD PRESSURE: 80 MMHG | OXYGEN SATURATION: 94 %

## 2020-12-15 DIAGNOSIS — R10.9 BILATERAL FLANK PAIN: ICD-10-CM

## 2020-12-15 DIAGNOSIS — Z87.442 HISTORY OF KIDNEY STONES: ICD-10-CM

## 2020-12-15 LAB
APPEARANCE UR: CLEAR
BILIRUB UR STRIP-MCNC: NEGATIVE MG/DL
COLOR UR AUTO: YELLOW
GLUCOSE UR STRIP.AUTO-MCNC: NEGATIVE MG/DL
KETONES UR STRIP.AUTO-MCNC: NEGATIVE MG/DL
LEUKOCYTE ESTERASE UR QL STRIP.AUTO: NEGATIVE
NITRITE UR QL STRIP.AUTO: NEGATIVE
PH UR STRIP.AUTO: 5.5 [PH] (ref 5–8)
PROT UR QL STRIP: NEGATIVE MG/DL
RBC UR QL AUTO: NORMAL
SP GR UR STRIP.AUTO: 1.02
UROBILINOGEN UR STRIP-MCNC: 0.2 MG/DL

## 2020-12-15 PROCEDURE — 81002 URINALYSIS NONAUTO W/O SCOPE: CPT | Performed by: PHYSICIAN ASSISTANT

## 2020-12-15 PROCEDURE — 99213 OFFICE O/P EST LOW 20 MIN: CPT | Performed by: PHYSICIAN ASSISTANT

## 2020-12-15 ASSESSMENT — FIBROSIS 4 INDEX: FIB4 SCORE: 1.42

## 2020-12-16 ENCOUNTER — HOSPITAL ENCOUNTER (OUTPATIENT)
Dept: RADIOLOGY | Facility: MEDICAL CENTER | Age: 70
End: 2020-12-16
Attending: PHYSICIAN ASSISTANT
Payer: COMMERCIAL

## 2020-12-16 DIAGNOSIS — Z87.442 HISTORY OF KIDNEY STONES: ICD-10-CM

## 2020-12-16 DIAGNOSIS — R10.9 BILATERAL FLANK PAIN: ICD-10-CM

## 2020-12-16 DIAGNOSIS — N20.0 NEPHROLITHIASIS: ICD-10-CM

## 2020-12-16 PROCEDURE — 74176 CT ABD & PELVIS W/O CONTRAST: CPT

## 2020-12-16 RX ORDER — KETOROLAC TROMETHAMINE 10 MG/1
10 TABLET, FILM COATED ORAL EVERY 4 HOURS PRN
Qty: 30 TAB | Refills: 0 | Status: SHIPPED | OUTPATIENT
Start: 2020-12-16 | End: 2020-12-18 | Stop reason: SDUPTHER

## 2020-12-16 NOTE — PROGRESS NOTES
Chief Complaint   Patient presents with   • Follow-Up     kidney stone        HISTORY OF PRESENT ILLNESS: Rob Clifton is an established 70 y.o. male here to discuss the evaluation and management of:    Patient is a pleasant 70-year-old male here today to discuss bilateral flank pain symptoms.  He tells me 3 days ago he developed left flank pain and then the following day developed right flank pain as well.  Describes pain as a deep discomfort pain that is nonradiating.  States symptoms have been constant but since this morning the pain symptoms it is almost nonexistent.  States he is not taking over-the-counter analgesics because the pain symptoms were not severe enough.  States he has been staying hydrated.  He tells me he has experienced a slight increase in urgency.  States his urinary stream is unchanged.  Urine is clear in color.  Denies hematuria, dysuria  pr urinary frequency.   States he does have a positive past medical history for kidney stones.  States 15 years ago while working overseas in [place] he passed a kidney stone.  States he did not seek medical attention for kidney stone symptoms.  States 15 years ago he experienced another kidney stone that was 9 mm in size and had to undergo lithotripsy.  He feels that it was a right-sided kidney stone.  He mentions that these symptoms feel similar to those symptoms but just not as severe.  He denies recent injury.  Prior to developing symptoms he denies not drinking enough water.  Denies muscle pain.  Denies fever, chills, nausea, vomiting, abdominal pain, groin pain, abnormal urethral discharge, diaphoresis, tachycardia, tachypnea.  Patient is inquiring about treatment options.      Patient Active Problem List    Diagnosis Date Noted   • Murmur 04/16/2020   • Vitamin B12 deficiency 05/31/2018   • Pure hypercholesterolemia 11/17/2017   • History of trauma 11/17/2017   • Arthritis 11/17/2017   • Bunion of great toe 11/17/2017       Allergies:Bactrim  ds    Current Outpatient Medications   Medication Sig Dispense Refill   • sildenafil citrate (VIAGRA) 100 MG tablet Take 1 tablet by mouth as needed for Erectile Dysfunction. 90 Tab 0   • cyanocobalamin (VITAMIN B-12) 100 MCG Tab Take 100 mcg by mouth every day.     • Naproxen Sodium (ALEVE) 220 MG Cap Take 2 Caps by mouth every day.     • Omega-3 Fatty Acids (FISH OIL) 1200 MG Cap Take 3 Caps by mouth every day.     • Misc Natural Products (LUTEIN 20 PO) Take 20 mg by mouth every day.       No current facility-administered medications for this visit.        Social History     Tobacco Use   • Smoking status: Never Smoker   • Smokeless tobacco: Never Used   Substance Use Topics   • Alcohol use: Yes     Alcohol/week: 0.0 oz     Frequency: 4 or more times a week     Drinks per session: 1 or 2     Binge frequency: Never     Comment: 1-2   • Drug use: Yes     Types: Marijuana, Oral, Inhaled     Comment: marijuana, smokes last use 20       Family Status   Relation Name Status   • Mo     • Fa     • MGMo     • MGFa     • PGMo     • PGFa     • Neg Hx  (Not Specified)     Family History   Problem Relation Age of Onset   • No Known Problems Mother    • No Known Problems Father    • Other Maternal Grandmother         Possibly TB   • No Known Problems Maternal Grandfather    • No Known Problems Paternal Grandmother    • No Known Problems Paternal Grandfather    • Heart Disease Neg Hx    • Cancer Neg Hx    • Diabetes Neg Hx    • Stroke Neg Hx        ROS:  Review of Systems   Constitutional: Negative for fever, chills, weight loss and malaise/fatigue.   HENT: Negative for ear pain, nosebleeds, congestion, sore throat and neck pain.    Eyes: Negative for blurred vision.   Respiratory: Negative for cough, sputum production, shortness of breath and wheezing.    Cardiovascular: Negative for chest pain, palpitations, orthopnea and leg swelling.   Gastrointestinal: Negative for  "heartburn, nausea, vomiting and abdominal pain.   Genitourinary: Negative for urgency and frequency.  Positive urgency.  Musculoskeletal: Negative for myalgias, back pain and joint pain.  + for bilateral flank pain.  Skin: Negative for rash and itching.   Neurological: Negative for dizziness, tingling, tremors, sensory change, focal weakness and headaches.   Endo/Heme/Allergies: Does not bruise/bleed easily.   Psychiatric/Behavioral: Negative for depression, suicidal ideas and memory loss.  The patient is not nervous/anxious and does not have insomnia.    All other systems reviewed and are negative except as in HPI.    Exam: /80 (BP Location: Left arm, Patient Position: Sitting, BP Cuff Size: Adult)   Pulse 88   Temp 36.6 °C (97.9 °F) (Temporal)   Resp 15   Ht 1.803 m (5' 11\")   Wt 94.3 kg (208 lb)   SpO2 94%  Body mass index is 29.01 kg/m².  General: Normal appearing. No distress.  HEENT: Normocephalic. Eyes conjunctiva clear lids without ptosis, ears normal shape and contour.  Neck: Supple without JVD. Thyroid is not enlarged.  Pulmonary: Clear to ausculation.  Normal effort. No rales, ronchi, or wheezing.  Cardiovascular: Regular rate and rhythm without murmur.   Abdomen: Soft, nontender, nondistended. Normal bowel sounds. Liver and spleen are not palpable.  Negative for CVA tenderness with palpation.  Neurologic: Grossly nonfocal.  Cranial nerves are normal.   Skin: Warm and dry.  No rashes or suspicious skin lesions.  Musculoskeletal: Normal gait. No extremity cyanosis, clubbing, or edema.  Psych: Normal mood and affect. Alert and oriented x3. Judgment and insight is normal.    Medical decision-making and discussion:  1. Bilateral flank pain  2. History of kidney stones    Lab Results   Component Value Date/Time    POCCOLOR yellow 12/15/2020 04:22 PM    POCAPPEAR clear 12/15/2020 04:22 PM    POCLEUKEST negative 12/15/2020 04:22 PM    POCNITRITE negative 12/15/2020 04:22 PM    POCUROBILIGE 0.2 " 12/15/2020 04:22 PM    POCPROTEIN negative 12/15/2020 04:22 PM    POCURPH 5.5 12/15/2020 04:22 PM    POCBLOOD small 12/15/2020 04:22 PM    POCSPGRV 1.020 12/15/2020 04:22 PM    POCKETONES negative 12/15/2020 04:22 PM    POCBILIRUBIN negative 12/15/2020 04:22 PM    POCGLUCUA negative 12/15/2020 04:22 PM        POCT urinalysis indicated small amount of blood.  Given patient's positive past medical history for recurrent kidney stones a CT renal has been ordered.  Patient be contacted with results.    Discussed with patient if pain symptoms worsen to contact me for further instructions.  Discussed treating patient with Flomax and possible pain medication if warranted.  Discussed ED precautions in great detail with patient.  Advised patient to stay hydrated, use over-the-counter anti-inflammatories as needed, rest.  Use proper body mechanics.    Patient will be contacted with CT scan results.  Discussed with patient his CT scan results indicate no abnormal findings and he still experiencing bilateral flank pain to make a follow-up appointment for further evaluation.  Patient agreed to plan.      - CT-RENAL COLIC EVALUATION(A/P W/O); Future  - POCT Urinalysis      Please note that this dictation was created using voice recognition software. I have made every reasonable attempt to correct obvious errors, but I expect that there are errors of grammar and possibly content that I did not discover before finalizing the note.    Assessment/Plan:  1. Bilateral flank pain  CT-RENAL COLIC EVALUATION(A/P W/O)    POCT Urinalysis   2. History of kidney stones  CT-RENAL COLIC EVALUATION(A/P W/O)    POCT Urinalysis       Return if symptoms worsen or fail to improve.

## 2020-12-17 ENCOUNTER — TELEPHONE (OUTPATIENT)
Dept: MEDICAL GROUP | Facility: PHYSICIAN GROUP | Age: 70
End: 2020-12-17

## 2020-12-18 DIAGNOSIS — N20.0 NEPHROLITHIASIS: ICD-10-CM

## 2020-12-18 RX ORDER — KETOROLAC TROMETHAMINE 10 MG/1
10 TABLET, FILM COATED ORAL EVERY 6 HOURS PRN
Qty: 30 TAB | Refills: 0 | Status: SHIPPED
Start: 2020-12-18 | End: 2020-12-23

## 2020-12-18 NOTE — TELEPHONE ENCOUNTER
VOICEMAIL  1. Caller Name: [facility]                       Call Back Number: [phone]    2. Message: [facility] regarding Ketorolac Tromethamine 10 mg Oral EVERY  4hrs PRN.  they are confused on how to dispense this or if rx is correct.     3. Patient approves office to leave a detailed voicemail/MyChart message: yes

## 2020-12-21 ENCOUNTER — TELEPHONE (OUTPATIENT)
Dept: MEDICAL GROUP | Facility: PHYSICIAN GROUP | Age: 70
End: 2020-12-21

## 2020-12-21 NOTE — TELEPHONE ENCOUNTER
VOICEMAIL  1. Caller Name: [facility]                      Call Back Number: [phone]    2. Message: regarding ketorolac (TORADOL) 10 MG Tab, the rx exceeds dose please send a clarification on dose       3. Patient approves office to leave a detailed voicemail/MyChart message: yes

## 2020-12-22 ENCOUNTER — TELEMEDICINE (OUTPATIENT)
Dept: MEDICAL GROUP | Facility: PHYSICIAN GROUP | Age: 70
End: 2020-12-22
Payer: COMMERCIAL

## 2020-12-22 DIAGNOSIS — N20.0 NEPHROLITHIASIS: ICD-10-CM

## 2020-12-22 DIAGNOSIS — I70.0 ARTERIOSCLEROSIS OF AORTA (HCC): ICD-10-CM

## 2020-12-22 DIAGNOSIS — N40.1 BENIGN PROSTATIC HYPERPLASIA WITH NOCTURIA: ICD-10-CM

## 2020-12-22 DIAGNOSIS — N20.0 RECURRENT KIDNEY STONES: ICD-10-CM

## 2020-12-22 DIAGNOSIS — Z12.5 SCREENING FOR MALIGNANT NEOPLASM OF PROSTATE: ICD-10-CM

## 2020-12-22 DIAGNOSIS — N28.1 RENAL CYST, RIGHT: ICD-10-CM

## 2020-12-22 DIAGNOSIS — R35.1 BENIGN PROSTATIC HYPERPLASIA WITH NOCTURIA: ICD-10-CM

## 2020-12-22 DIAGNOSIS — I77.819 ECTATIC AORTA (HCC): ICD-10-CM

## 2020-12-22 DIAGNOSIS — K57.90 DIVERTICULOSIS: ICD-10-CM

## 2020-12-22 DIAGNOSIS — E78.00 PURE HYPERCHOLESTEROLEMIA: ICD-10-CM

## 2020-12-22 PROCEDURE — 99214 OFFICE O/P EST MOD 30 MIN: CPT | Mod: 95,CR | Performed by: PHYSICIAN ASSISTANT

## 2020-12-22 RX ORDER — ATORVASTATIN CALCIUM 20 MG/1
20 TABLET, FILM COATED ORAL EVERY EVENING
Qty: 90 TAB | Refills: 3 | Status: SHIPPED
Start: 2020-12-22 | End: 2021-05-18

## 2020-12-22 ASSESSMENT — FIBROSIS 4 INDEX: FIB4 SCORE: 1.42

## 2020-12-23 VITALS — WEIGHT: 205 LBS | BODY MASS INDEX: 28.7 KG/M2 | TEMPERATURE: 97.9 F | HEIGHT: 71 IN | RESPIRATION RATE: 16 BRPM

## 2020-12-23 PROBLEM — N40.1 BENIGN PROSTATIC HYPERPLASIA WITH NOCTURIA: Status: ACTIVE | Noted: 2020-12-23

## 2020-12-23 PROBLEM — N28.1 RENAL CYST, RIGHT: Status: ACTIVE | Noted: 2020-12-23

## 2020-12-23 PROBLEM — I70.0 ARTERIOSCLEROSIS OF AORTA (HCC): Status: ACTIVE | Noted: 2020-12-23

## 2020-12-23 PROBLEM — K57.90 DIVERTICULOSIS: Status: ACTIVE | Noted: 2020-12-23

## 2020-12-23 PROBLEM — R35.1 BENIGN PROSTATIC HYPERPLASIA WITH NOCTURIA: Status: ACTIVE | Noted: 2020-12-23

## 2020-12-23 PROBLEM — N20.0 RECURRENT KIDNEY STONES: Status: ACTIVE | Noted: 2020-12-23

## 2020-12-23 PROBLEM — I77.819 ECTATIC AORTA (HCC): Status: ACTIVE | Noted: 2020-12-23

## 2020-12-23 NOTE — PROGRESS NOTES
Virtual Visit: Established Patient   This visit was conducted via Zoom using secure and encrypted videoconferencing technology. The patient was in a private location in the state of [place].    The patient's identity was confirmed and verbal consent was obtained for this virtual visit.    Subjective:   CC: Go over renal colic CT results and diagnosis kidney stone.  Chief Complaint   Patient presents with   • Results     ct       Rob Clifton is a 70 y.o. male presenting for evaluation and management of:    Patient is a pleasant 70-year-old male here today to discuss findings on renal colic CT scan completed on 12/6/2020.  Results are as follows:      FINDINGS:  The lung bases are unremarkable.     The unopacified liver, spleen, adrenal glands, gallbladder, and pancreas are unremarkable. There is a small splenule in the left upper quadrant.     A hypodense mass in the upper pole of the right kidney is consistent with a cyst. A 3 mm nonobstructing calculus is in the lower pole of the left kidney.     There is a duodenal diverticulum. There are scattered colonic diverticula. The appendix is normal in appearance.     There are scattered arterial calcifications. The abdominal aorta is ectatic.     A small paraumbilical hernia contains fat.     The bladder is unremarkable. The prostate appears enlarged.     No free fluid or adenopathy is identified.     Degenerative changes are in the spine.     IMPRESSION:     1.  Left nephrolithiasis.     2.  Diverticulosis.     3.  Atherosclerosis.     4.  Prostate enlargement.      Patient states since her last appointment on 12/15/2020 he has been no longer experiencing bilateral flank pain and is currently asymptomatic.  Toradol 10 mg every 6 hours as needed for pain symptoms was sent to patient's pharmacy stating for patient to take no more than 40 mg in a 24-hour span and to not take medication for more than 5 days straight.  Patient states he did not  medication  because the pharmacist had stated this was an aggressive treatment option.  Discussed with patient medication is only to be taken if he is experiencing pain symptoms.  Discussed chronic use of NSAIDs with patient in great detail.   Ultrasound results indicated that patient has a nonobstructing a liter left kidney stone as well as a right renal cyst.      Discussed with patient ultrasound results indicated that he had diverticulosis which is small bulging pouches and his digestive tract.  Discussed diverticulitis with patient.  Patient is asymptomatic.    Advised patient that his abdominal aorta is ectatic today meaning it is mildly dilated.  Additional imaging will be ordered to further evaluate the diameter of dilation.  Patient is asymptomatic.    Advised patient that there is a scattered arterial calcifications.  Patient has a past medical history for pure hypercholesteremia.  He is not on a statin medication patient is agreed to start a statin medication.    Ultrasound results indicate prostate enlargement.  Patient states he was unaware of this as well.  States he voids 2 times during the night.  He feels that his urinary stream feels different and describes it as a little bit more restricted.  PSA in 2015 was normal.        ROS   Denies any recent fevers or chills. No nausea or vomiting. No chest pains or shortness of breath.     Allergies   Allergen Reactions   • Bactrim Ds Unspecified     Patient states jaundice       Current medicines (including changes today)  Current Outpatient Medications   Medication Sig Dispense Refill   • atorvastatin (LIPITOR) 20 MG Tab Take 1 Tab by mouth every evening. 90 Tab 3   • sildenafil citrate (VIAGRA) 100 MG tablet Take 1 tablet by mouth as needed for Erectile Dysfunction. 90 Tab 0   • cyanocobalamin (VITAMIN B-12) 100 MCG Tab Take 100 mcg by mouth every day.     • Naproxen Sodium (ALEVE) 220 MG Cap Take 2 Caps by mouth every day.     • Omega-3 Fatty Acids (FISH OIL) 1200  "MG Cap Take 3 Caps by mouth every day.     • Misc Natural Products (LUTEIN 20 PO) Take 20 mg by mouth every day.       No current facility-administered medications for this visit.        Patient Active Problem List    Diagnosis Date Noted   • Murmur 04/16/2020   • Vitamin B12 deficiency 05/31/2018   • Pure hypercholesterolemia 11/17/2017   • History of trauma 11/17/2017   • Arthritis 11/17/2017   • Bunion of great toe 11/17/2017       Family History   Problem Relation Age of Onset   • No Known Problems Mother    • No Known Problems Father    • Other Maternal Grandmother         Possibly TB   • No Known Problems Maternal Grandfather    • No Known Problems Paternal Grandmother    • No Known Problems Paternal Grandfather    • Heart Disease Neg Hx    • Cancer Neg Hx    • Diabetes Neg Hx    • Stroke Neg Hx        He  has a past medical history of Achilles tendon infection, Arthritis, and Kidney stones.  He  has a past surgical history that includes other orthopedic surgery (Bilateral, 1998); other orthopedic surgery (Left, 1982); other orthopedic surgery (2001, 2015); and irrigation & debridement ortho (Left, 4/9/2020).       Objective:   Temp 36.6 °C (97.9 °F) (Oral) Comment: per patient Comment (Src): per patient  Resp 16   Ht 1.803 m (5' 11\") Comment: per patient  Wt 93 kg (205 lb) Comment: per patient  BMI 28.59 kg/m²     Physical Exam:  Constitutional: Alert, no distress, well-groomed.  Skin: No rashes in visible areas.  Eye: Round. Conjunctiva clear, lids normal. No icterus.   ENMT: Lips pink without lesions, good dentition, moist mucous membranes. Phonation normal.  Neck: No masses, no thyromegaly. Moves freely without pain.  Respiratory: Unlabored respiratory effort, no cough or audible wheeze  Psych: Alert and oriented x3, normal affect and mood.       Assessment and Plan:   The following treatment plan was discussed:     1. Recurrent kidney stones  2. Nephrolithiasis  Per patient request patient has been " referred to urology.  Discussed importance of staying hydrated.  Discussed ED precautions with patient.  Discussed recent renal colic ultrasound results with patient.  Positive for nonobstructing monitor left kidney stone and a right renal cyst.  Discussed with patient renal cyst are commonly benign and repeat imaging may be warranted to make sure cyst is stable.  Patient agreed to plan.  Patient will follow up with PCP in regards to monitoring right renal cyst.    Discussed with patient Toradol was prescribed to control pain symptoms if he was to develop pain symptoms.  Do not take more than 40 mg of Toradol in a 24-hour span and do not take more than 5 days consecutively.  Discussed chronic side effects of chronic NSAID use.  Patient agreed to plan.    - REFERRAL TO UROLOGY    3. Benign prostatic hyperplasia with nocturia  New diagnosis.  Discussed recent renal colic ultrasound results.  Positive for BPH.  Patient has been referred to urology.  PSA has been ordered.  Patient be contacted with results.    - REFERRAL TO UROLOGY    4. Pure hypercholesterolemia  Chronic problem.  Could be better controlled.  Discussed 10-year ASCVD and CVD risk course with patient.  10-year ASCVD risk score is high and 10-year CVD risk score is medium risk.  Patient is agreed to start a statin medication.  Medication has been sent to patient's pharmacy.  Discussed with patient a common side effect is myalgias.  Advised patient he can take over-the-counter co-Q10 once daily 2-3 weeks prior to starting statin medication or he can start statin medication and if he develops myalgias to stop statin medication start co-Q10 for 2-3 weeks and then restart statin medication.  Patient agreed to plan.  Continue working on diet and exercise.  Discussed renal colic ultrasound results with patient.  Results indicated scattered arterial calcifications and ectatic aorta.  An abdominal aorta ultrasound has been ordered to further evaluate ectatic  aorta.  Patient will be contacted with results.    - atorvastatin (LIPITOR) 20 MG Tab; Take 1 Tab by mouth every evening.  Dispense: 90 Tab; Refill: 3    5. Arteriosclerosis of aorta (HCC)  Same as # 4.    - atorvastatin (LIPITOR) 20 MG Tab; Take 1 Tab by mouth every evening.  Dispense: 90 Tab; Refill: 3  - US-ABDOMINAL AORTA W/O DOPPLER    6. Ectatic aorta (HCC)  Same as # 4.    - US-ABDOMINAL AORTA W/O DOPPLER    7. Diverticulosis  Recent ultrasound results of renal colic indicated diverticulosis.  Discussed diverticulitis with patient great detail and when to follow-up he experiences acute flareup.  Discussed the importance of hydration and having regular bowel movements.  Advised patient increase fiber consumption.    Follow-up for worsening symptoms,lack of expected recovery, or should new symptoms or problems arise.      8. Screening for malignant neoplasm of prostate    - PROSTATE SPECIFIC AG SCREENING; Future    9. Renal cyst, right  Recent renal colic ultrasound results indicated a right renal cyst.   Discussed with patient renal cyst are commonly benign and repeat imaging may be warranted to make sure cyst is stable.  Patient agreed to plan.  Patient will follow up with PCP in regards to monitoring right renal cyst.          Follow-up: Return if symptoms worsen or fail to improve.

## 2020-12-28 ENCOUNTER — HOSPITAL ENCOUNTER (OUTPATIENT)
Dept: RADIOLOGY | Facility: MEDICAL CENTER | Age: 70
End: 2020-12-28
Attending: PHYSICIAN ASSISTANT
Payer: COMMERCIAL

## 2020-12-28 PROCEDURE — 76775 US EXAM ABDO BACK WALL LIM: CPT

## 2021-03-03 DIAGNOSIS — Z23 NEED FOR VACCINATION: ICD-10-CM

## 2021-04-05 ENCOUNTER — APPOINTMENT (OUTPATIENT)
Dept: MEDICAL GROUP | Facility: PHYSICIAN GROUP | Age: 71
End: 2021-04-05
Payer: MEDICARE

## 2021-04-16 ENCOUNTER — NON-PROVIDER VISIT (OUTPATIENT)
Dept: MEDICAL GROUP | Facility: PHYSICIAN GROUP | Age: 71
End: 2021-04-16
Payer: MEDICARE

## 2021-04-16 DIAGNOSIS — Z23 NEED FOR VACCINATION: ICD-10-CM

## 2021-04-16 PROCEDURE — 90471 IMMUNIZATION ADMIN: CPT | Performed by: FAMILY MEDICINE

## 2021-04-16 PROCEDURE — 90750 HZV VACC RECOMBINANT IM: CPT | Performed by: FAMILY MEDICINE

## 2021-04-16 NOTE — PROGRESS NOTES
"Burton Clifton is a 70 y.o. male here for a non-provider visit for:   SHINGRIX (Shingles)    Reason for immunization: continue or complete series started at the office  Immunization records indicate need for vaccine: Yes, confirmed with Epic  Minimum interval has been met for this vaccine: Yes  ABN completed: Not Indicated    Order and dose verified by: Charlie  VIS Dated  10/30/2019 was given to patient: Yes  All IAC Questionnaire questions were answered \"No.\"    Patient tolerated injection and no adverse effects were observed or reported: Yes    Pt scheduled for next dose in series: Not Indicated    "

## 2021-04-28 NOTE — CARE PLAN
Problem: Communication  Goal: The ability to communicate needs accurately and effectively will improve  Outcome: PROGRESSING AS EXPECTED     Problem: Safety  Goal: Will remain free from injury  Outcome: PROGRESSING AS EXPECTED     Problem: Bowel/Gastric:  Goal: Will not experience complications related to bowel motility  Outcome: PROGRESSING AS EXPECTED      Dr. Villalta

## 2021-05-18 ENCOUNTER — OFFICE VISIT (OUTPATIENT)
Dept: MEDICAL GROUP | Facility: PHYSICIAN GROUP | Age: 71
End: 2021-05-18
Payer: MEDICARE

## 2021-05-18 VITALS
OXYGEN SATURATION: 96 % | TEMPERATURE: 98 F | HEIGHT: 71 IN | HEART RATE: 77 BPM | BODY MASS INDEX: 26.68 KG/M2 | RESPIRATION RATE: 16 BRPM | WEIGHT: 190.6 LBS | SYSTOLIC BLOOD PRESSURE: 100 MMHG | DIASTOLIC BLOOD PRESSURE: 70 MMHG

## 2021-05-18 DIAGNOSIS — E78.00 PURE HYPERCHOLESTEROLEMIA: ICD-10-CM

## 2021-05-18 DIAGNOSIS — R19.09 SUPRAPUBIC MASS: ICD-10-CM

## 2021-05-18 PROBLEM — N52.9 ERECTILE DYSFUNCTION: Status: ACTIVE | Noted: 2021-01-15

## 2021-05-18 PROCEDURE — 99213 OFFICE O/P EST LOW 20 MIN: CPT | Performed by: PHYSICIAN ASSISTANT

## 2021-05-18 ASSESSMENT — FIBROSIS 4 INDEX: FIB4 SCORE: 1.42

## 2021-05-18 ASSESSMENT — PATIENT HEALTH QUESTIONNAIRE - PHQ9: CLINICAL INTERPRETATION OF PHQ2 SCORE: 0

## 2021-05-19 NOTE — PROGRESS NOTES
"Chief Complaint   Patient presents with   • Establish Care   • Bump     lower right abdomen, \"buldge\" x 2 wks ago        HISTORY OF PRESENT ILLNESS: Rob Clifton is an established 70 y.o. male here to discuss the evaluation and management of:    Patient is a pleasant 70-year-old male here today to establish care and discuss an abdominal mass.  He tells me on May 4 he noticed a reducible nontender mass of right pelvic region.  He feels mass has gradually gotten bigger.  States occasionally he feels mild discomfort with increased abdominal pressure.  He denies other associated symptoms.  He denies nausea, vomiting, fever, chills, lymphadenopathy, night sweats, unintentional weight loss, melena, hematochezia, changes in bowel habits, tachycardia, tachypnea.    Patient has a positive medical history for pure hypercholesterolemia.  Patient was prescribed Lipitor in December 2020 but states he discontinued medication several months ago.  He tells me he has been doing weight watchers and eating much healthier.  Patient also lives an active lifestyle.  Patient wants to manage cholesterol with a healthy diet and regular exercise routine.      Patient Active Problem List    Diagnosis Date Noted   • Erectile dysfunction 01/15/2021   • Recurrent kidney stones 12/23/2020   • Benign prostatic hyperplasia with nocturia 12/23/2020   • Arteriosclerosis of aorta (HCC) 12/23/2020   • Ectatic aorta (HCC) 12/23/2020   • Diverticulosis 12/23/2020   • Renal cyst, right 12/23/2020   • Murmur 04/16/2020   • Vitamin B12 deficiency 05/31/2018   • Pure hypercholesterolemia 11/17/2017   • History of trauma 11/17/2017   • Arthritis 11/17/2017   • Bunion of great toe 11/17/2017       Allergies:Bactrim ds    Current Outpatient Medications   Medication Sig Dispense Refill   • sildenafil citrate (VIAGRA) 100 MG tablet Take 1 tablet by mouth as needed for Erectile Dysfunction. 90 Tab 0   • cyanocobalamin (VITAMIN B-12) 100 MCG Tab Take 100 " mcg by mouth every day.     • Naproxen Sodium (ALEVE) 220 MG Cap Take 2 Caps by mouth every day.     • Omega-3 Fatty Acids (FISH OIL) 1200 MG Cap Take 3 Caps by mouth every day.     • Misc Natural Products (LUTEIN 20 PO) Take 20 mg by mouth every day.       No current facility-administered medications for this visit.       Social History     Tobacco Use   • Smoking status: Never Smoker   • Smokeless tobacco: Never Used   Vaping Use   • Vaping Use: Never used   Substance Use Topics   • Alcohol use: Yes     Alcohol/week: 0.6 oz     Types: 1 Glasses of wine per week     Comment: 1-2   • Drug use: Yes     Types: Marijuana, Oral, Inhaled     Comment: marijuana occ.        Family Status   Relation Name Status   • Mo     • Fa     • MGMo     • MGFa     • PGMo     • PGFa     • Neg Hx  (Not Specified)     Family History   Problem Relation Age of Onset   • No Known Problems Mother    • No Known Problems Father    • Other Maternal Grandmother         Possibly TB   • No Known Problems Maternal Grandfather    • No Known Problems Paternal Grandmother    • No Known Problems Paternal Grandfather    • Heart Disease Neg Hx    • Cancer Neg Hx    • Diabetes Neg Hx    • Stroke Neg Hx        ROS:  Review of Systems   Constitutional: Negative for fever, chills, weight loss and malaise/fatigue.   HENT: Negative for ear pain, nosebleeds, congestion, sore throat and neck pain.    Eyes: Negative for blurred vision.   Respiratory: Negative for cough, sputum production, shortness of breath and wheezing.    Cardiovascular: Negative for chest pain, palpitations, orthopnea and leg swelling.   Gastrointestinal: Negative for heartburn, nausea, vomiting.  Genitourinary: Negative for dysuria, urgency and frequency.   Musculoskeletal: Negative for myalgias, back pain and joint pain.   Skin: Negative for rash and itching.   Neurological: Negative for dizziness, tingling, tremors, sensory change, focal  "weakness and headaches.   Endo/Heme/Allergies: Does not bruise/bleed easily.   Psychiatric/Behavioral: Negative for depression, suicidal ideas and memory loss.  The patient is not nervous/anxious and does not have insomnia.    All other systems reviewed and are negative except as in HPI.    Exam: /70 (BP Location: Left arm, Patient Position: Sitting, BP Cuff Size: Adult)   Pulse 77   Temp 36.7 °C (98 °F) (Temporal)   Resp 16   Ht 1.803 m (5' 11\")   Wt 86.5 kg (190 lb 9.6 oz)   SpO2 96%  Body mass index is 26.58 kg/m².  General: Normal appearing. No distress.  HEENT: Normocephalic. Eyes conjunctiva clear lids without ptosis, ears normal shape and contour.  Neck: Supple without JVD. Thyroid is not enlarged.  Pulmonary: Clear to ausculation.  Normal effort. No rales, ronchi, or wheezing.  Cardiovascular: Regular rate and rhythm without murmur.   Abdomen: Soft, nontender, nondistended.  Positive for soft reducible half-dollar sized mass of right pelvic region that is nontender to palpation.  No signs of trauma.  Negative for erythema/warmth/excoriation.  Neurologic: Grossly nonfocal.  Cranial nerves are normal.   Skin: Warm and dry.  No rashes or suspicious skin lesions.  Musculoskeletal: Normal gait. No extremity cyanosis, clubbing, or edema.  Psych: Normal mood and affect. Alert and oriented x3. Judgment and insight is normal.    Medical decision-making and discussion:  1. Pure hypercholesterolemia  Chronic problem.  Could be better controlled.  Patient repeat lab work in the near future.  Advised patient continue working on diet and exercise.    2. Suprapubic mass  Pelvic ultrasound has been ordered.  Patient be contacted with results.  Advised patient to use proper body mechanics.  Discussed ED precautions in great detail with patient.  - US-PELVIC TRANSABDOMINAL ONLY; Future      Patient will schedule a Medicare annual wellness appointment in the near future.    Please note that this dictation was " created using voice recognition software. I have made every reasonable attempt to correct obvious errors, but I expect that there are errors of grammar and possibly content that I did not discover before finalizing the note.    Assessment/Plan:  1. Pure hypercholesterolemia     2. Suprapubic mass  US-PELVIC TRANSABDOMINAL ONLY    CANCELED: US-HERNIA ABDOMEN       Return if symptoms worsen or fail to improve.

## 2021-05-25 ENCOUNTER — HOSPITAL ENCOUNTER (OUTPATIENT)
Dept: RADIOLOGY | Facility: MEDICAL CENTER | Age: 71
End: 2021-05-25
Attending: PHYSICIAN ASSISTANT
Payer: MEDICARE

## 2021-05-25 DIAGNOSIS — R19.09 SUPRAPUBIC MASS: ICD-10-CM

## 2021-05-25 PROCEDURE — 76705 ECHO EXAM OF ABDOMEN: CPT

## 2021-05-27 DIAGNOSIS — K40.90 RIGHT INGUINAL HERNIA: ICD-10-CM

## 2021-07-06 ENCOUNTER — HOSPITAL ENCOUNTER (OUTPATIENT)
Dept: RADIOLOGY | Facility: MEDICAL CENTER | Age: 71
End: 2021-07-06
Attending: UROLOGY
Payer: MEDICARE

## 2021-07-06 DIAGNOSIS — N20.0 CALCULUS OF KIDNEY: ICD-10-CM

## 2021-07-06 PROCEDURE — 74018 RADEX ABDOMEN 1 VIEW: CPT

## 2021-07-08 ENCOUNTER — HOSPITAL ENCOUNTER (EMERGENCY)
Facility: MEDICAL CENTER | Age: 71
End: 2021-07-08
Attending: EMERGENCY MEDICINE
Payer: MEDICARE

## 2021-07-08 ENCOUNTER — APPOINTMENT (OUTPATIENT)
Dept: RADIOLOGY | Facility: MEDICAL CENTER | Age: 71
End: 2021-07-08
Attending: EMERGENCY MEDICINE
Payer: MEDICARE

## 2021-07-08 VITALS
HEART RATE: 53 BPM | OXYGEN SATURATION: 99 % | SYSTOLIC BLOOD PRESSURE: 122 MMHG | DIASTOLIC BLOOD PRESSURE: 70 MMHG | TEMPERATURE: 99.6 F | HEIGHT: 71 IN | BODY MASS INDEX: 26.79 KG/M2 | RESPIRATION RATE: 18 BRPM | WEIGHT: 191.36 LBS

## 2021-07-08 DIAGNOSIS — M54.50 LUMBAR BACK PAIN: ICD-10-CM

## 2021-07-08 LAB
APPEARANCE UR: CLEAR
BILIRUB UR QL STRIP.AUTO: NEGATIVE
COLOR UR: YELLOW
GLUCOSE UR STRIP.AUTO-MCNC: NEGATIVE MG/DL
KETONES UR STRIP.AUTO-MCNC: ABNORMAL MG/DL
LEUKOCYTE ESTERASE UR QL STRIP.AUTO: NEGATIVE
MICRO URNS: ABNORMAL
NITRITE UR QL STRIP.AUTO: NEGATIVE
PH UR STRIP.AUTO: 6.5 [PH] (ref 5–8)
PROT UR QL STRIP: NEGATIVE MG/DL
RBC UR QL AUTO: NEGATIVE
SP GR UR STRIP.AUTO: 1.02

## 2021-07-08 PROCEDURE — 99284 EMERGENCY DEPT VISIT MOD MDM: CPT

## 2021-07-08 PROCEDURE — 700111 HCHG RX REV CODE 636 W/ 250 OVERRIDE (IP): Performed by: EMERGENCY MEDICINE

## 2021-07-08 PROCEDURE — 81003 URINALYSIS AUTO W/O SCOPE: CPT

## 2021-07-08 PROCEDURE — 72100 X-RAY EXAM L-S SPINE 2/3 VWS: CPT

## 2021-07-08 PROCEDURE — 96372 THER/PROPH/DIAG INJ SC/IM: CPT

## 2021-07-08 RX ORDER — HYDROMORPHONE HYDROCHLORIDE 1 MG/ML
1 INJECTION, SOLUTION INTRAMUSCULAR; INTRAVENOUS; SUBCUTANEOUS ONCE
Status: COMPLETED | OUTPATIENT
Start: 2021-07-08 | End: 2021-07-08

## 2021-07-08 RX ORDER — OXYCODONE HYDROCHLORIDE AND ACETAMINOPHEN 5; 325 MG/1; MG/1
1 TABLET ORAL EVERY 4 HOURS PRN
Qty: 15 TABLET | Refills: 0 | Status: SHIPPED | OUTPATIENT
Start: 2021-07-08 | End: 2021-07-13

## 2021-07-08 RX ORDER — KETOROLAC TROMETHAMINE 30 MG/ML
30 INJECTION, SOLUTION INTRAMUSCULAR; INTRAVENOUS ONCE
Status: COMPLETED | OUTPATIENT
Start: 2021-07-08 | End: 2021-07-08

## 2021-07-08 RX ORDER — METHYLPREDNISOLONE 4 MG/1
TABLET ORAL
Qty: 21 TABLET | Refills: 0 | Status: SHIPPED | OUTPATIENT
Start: 2021-07-08 | End: 2021-08-31

## 2021-07-08 RX ORDER — ONDANSETRON 4 MG/1
4 TABLET, ORALLY DISINTEGRATING ORAL ONCE
Status: COMPLETED | OUTPATIENT
Start: 2021-07-08 | End: 2021-07-08

## 2021-07-08 RX ADMIN — ONDANSETRON 4 MG: 4 TABLET, ORALLY DISINTEGRATING ORAL at 09:12

## 2021-07-08 RX ADMIN — HYDROMORPHONE HYDROCHLORIDE 1 MG: 1 INJECTION, SOLUTION INTRAMUSCULAR; INTRAVENOUS; SUBCUTANEOUS at 09:12

## 2021-07-08 RX ADMIN — KETOROLAC TROMETHAMINE 30 MG: 30 INJECTION, SOLUTION INTRAMUSCULAR at 09:36

## 2021-07-08 ASSESSMENT — FIBROSIS 4 INDEX: FIB4 SCORE: 1.42
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
## 2021-07-08 NOTE — ED NOTES
Pt provided heat pack since no relief with ice pack, pillow for under knees  Urine sample requested  Wife, Haris at bedside

## 2021-07-08 NOTE — ED NOTES
"Patient states \"Wrenched my back yesterday afternoon playing golf. The lower right back has extreme pain.\"  "

## 2021-07-08 NOTE — ED TRIAGE NOTES
Pt ambulates to triage  Chief Complaint   Patient presents with   • Back Pain   pt reports hx of back pain,c/o of pain on lower R side  Pt states he agrivated his back playing golf yesterday    Pt A & 0 x 4, speech clear, ambulates well  Pt reports no relief with ice or ibuprofen or Meloxicam at home    Pt oriented to room, call light within reach, patricio in lowest position

## 2021-07-08 NOTE — ED NOTES
Pt discharged, reviewed all discharge instructions including medications and follow up,discussed no driving or drinking alcohol on narcotics and the risks of taking narcotics, pt given prescription, pt verbalizes understanding, and denies questions. Pt provided paper prescriptions because electronic system is down.    Escorted to lobby.

## 2021-08-24 ENCOUNTER — APPOINTMENT (RX ONLY)
Dept: URBAN - METROPOLITAN AREA CLINIC 35 | Facility: CLINIC | Age: 71
Setting detail: DERMATOLOGY
End: 2021-08-24

## 2021-08-24 DIAGNOSIS — L81.4 OTHER MELANIN HYPERPIGMENTATION: ICD-10-CM

## 2021-08-24 DIAGNOSIS — Z71.89 OTHER SPECIFIED COUNSELING: ICD-10-CM

## 2021-08-24 DIAGNOSIS — L82.1 OTHER SEBORRHEIC KERATOSIS: ICD-10-CM

## 2021-08-24 DIAGNOSIS — L57.0 ACTINIC KERATOSIS: ICD-10-CM

## 2021-08-24 DIAGNOSIS — D22 MELANOCYTIC NEVI: ICD-10-CM

## 2021-08-24 PROBLEM — D22.5 MELANOCYTIC NEVI OF TRUNK: Status: ACTIVE | Noted: 2021-08-24

## 2021-08-24 PROCEDURE — 17000 DESTRUCT PREMALG LESION: CPT

## 2021-08-24 PROCEDURE — 99203 OFFICE O/P NEW LOW 30 MIN: CPT | Mod: 25

## 2021-08-24 PROCEDURE — ? LIQUID NITROGEN

## 2021-08-24 PROCEDURE — ? COUNSELING

## 2021-08-24 ASSESSMENT — LOCATION DETAILED DESCRIPTION DERM
LOCATION DETAILED: RIGHT SUPERIOR UPPER BACK
LOCATION DETAILED: RIGHT SUPERIOR LATERAL UPPER BACK
LOCATION DETAILED: RIGHT MID-UPPER BACK
LOCATION DETAILED: RIGHT CENTRAL MALAR CHEEK

## 2021-08-24 ASSESSMENT — LOCATION SIMPLE DESCRIPTION DERM
LOCATION SIMPLE: RIGHT UPPER BACK
LOCATION SIMPLE: RIGHT CHEEK

## 2021-08-24 ASSESSMENT — LOCATION ZONE DERM
LOCATION ZONE: FACE
LOCATION ZONE: TRUNK

## 2021-08-24 NOTE — PROCEDURE: LIQUID NITROGEN
Consent: The patient's consent was obtained including but not limited to risks of crusting, scabbing, blistering, scarring, darker or lighter pigmentary change, recurrence, incomplete removal and infection.
Show Aperture Variable?: Yes
Duration Of Freeze Thaw-Cycle (Seconds): 10
Number Of Freeze-Thaw Cycles: 1 freeze-thaw cycle
Render Post-Care Instructions In Note?: no
Post-Care Instructions: I reviewed with the patient in detail post-care instructions. Patient is to wear sunprotection, and avoid picking at any of the treated lesions. Pt may apply Vaseline to crusted or scabbing areas.
Detail Level: Detailed

## 2021-08-31 ENCOUNTER — PRE-ADMISSION TESTING (OUTPATIENT)
Dept: ADMISSIONS | Facility: MEDICAL CENTER | Age: 71
End: 2021-08-31
Attending: SURGERY
Payer: MEDICARE

## 2021-08-31 VITALS — WEIGHT: 195.99 LBS | HEIGHT: 71 IN | BODY MASS INDEX: 27.44 KG/M2

## 2021-08-31 ASSESSMENT — FIBROSIS 4 INDEX: FIB4 SCORE: 1.44

## 2021-09-09 ENCOUNTER — ANESTHESIA (OUTPATIENT)
Dept: SURGERY | Facility: MEDICAL CENTER | Age: 71
End: 2021-09-09
Payer: MEDICARE

## 2021-09-09 ENCOUNTER — HOSPITAL ENCOUNTER (OUTPATIENT)
Facility: MEDICAL CENTER | Age: 71
End: 2021-09-09
Attending: SURGERY | Admitting: SURGERY
Payer: MEDICARE

## 2021-09-09 ENCOUNTER — ANESTHESIA EVENT (OUTPATIENT)
Dept: SURGERY | Facility: MEDICAL CENTER | Age: 71
End: 2021-09-09
Payer: MEDICARE

## 2021-09-09 VITALS
BODY MASS INDEX: 26.7 KG/M2 | RESPIRATION RATE: 16 BRPM | WEIGHT: 190.7 LBS | SYSTOLIC BLOOD PRESSURE: 126 MMHG | TEMPERATURE: 97.6 F | HEIGHT: 71 IN | OXYGEN SATURATION: 98 % | HEART RATE: 71 BPM | DIASTOLIC BLOOD PRESSURE: 70 MMHG

## 2021-09-09 DIAGNOSIS — G89.18 POSTOPERATIVE PAIN: ICD-10-CM

## 2021-09-09 PROCEDURE — 501570 HCHG TROCAR, SEPARATOR: Performed by: SURGERY

## 2021-09-09 PROCEDURE — C9803 HOPD COVID-19 SPEC COLLECT: HCPCS

## 2021-09-09 PROCEDURE — 160009 HCHG ANES TIME/MIN: Performed by: SURGERY

## 2021-09-09 PROCEDURE — 700111 HCHG RX REV CODE 636 W/ 250 OVERRIDE (IP)

## 2021-09-09 PROCEDURE — 700101 HCHG RX REV CODE 250: Performed by: ANESTHESIOLOGY

## 2021-09-09 PROCEDURE — C1781 MESH (IMPLANTABLE): HCPCS | Performed by: SURGERY

## 2021-09-09 PROCEDURE — 160041 HCHG SURGERY MINUTES - EA ADDL 1 MIN LEVEL 4: Performed by: SURGERY

## 2021-09-09 PROCEDURE — 502714 HCHG ROBOTIC SURGERY SERVICES: Performed by: SURGERY

## 2021-09-09 PROCEDURE — 700105 HCHG RX REV CODE 258: Performed by: SURGERY

## 2021-09-09 PROCEDURE — 160025 RECOVERY II MINUTES (STATS): Performed by: SURGERY

## 2021-09-09 PROCEDURE — 87426 SARSCOV CORONAVIRUS AG IA: CPT

## 2021-09-09 PROCEDURE — 700101 HCHG RX REV CODE 250: Performed by: SURGERY

## 2021-09-09 PROCEDURE — 700111 HCHG RX REV CODE 636 W/ 250 OVERRIDE (IP): Performed by: ANESTHESIOLOGY

## 2021-09-09 PROCEDURE — 501838 HCHG SUTURE GENERAL: Performed by: SURGERY

## 2021-09-09 PROCEDURE — A9270 NON-COVERED ITEM OR SERVICE: HCPCS | Performed by: ANESTHESIOLOGY

## 2021-09-09 PROCEDURE — 160002 HCHG RECOVERY MINUTES (STAT): Performed by: SURGERY

## 2021-09-09 PROCEDURE — 160046 HCHG PACU - 1ST 60 MINS PHASE II: Performed by: SURGERY

## 2021-09-09 PROCEDURE — 160035 HCHG PACU - 1ST 60 MINS PHASE I: Performed by: SURGERY

## 2021-09-09 PROCEDURE — 160029 HCHG SURGERY MINUTES - 1ST 30 MINS LEVEL 4: Performed by: SURGERY

## 2021-09-09 PROCEDURE — 160048 HCHG OR STATISTICAL LEVEL 1-5: Performed by: SURGERY

## 2021-09-09 PROCEDURE — 700102 HCHG RX REV CODE 250 W/ 637 OVERRIDE(OP): Performed by: ANESTHESIOLOGY

## 2021-09-09 DEVICE — MESH PROGRIP LAPROSCOPIC SELF FIXATING (1/CA): Type: IMPLANTABLE DEVICE | Site: ABDOMEN | Status: FUNCTIONAL

## 2021-09-09 RX ORDER — HYDROMORPHONE HYDROCHLORIDE 1 MG/ML
0.2 INJECTION, SOLUTION INTRAMUSCULAR; INTRAVENOUS; SUBCUTANEOUS
Status: DISCONTINUED | OUTPATIENT
Start: 2021-09-09 | End: 2021-09-09 | Stop reason: HOSPADM

## 2021-09-09 RX ORDER — ONDANSETRON 2 MG/ML
4 INJECTION INTRAMUSCULAR; INTRAVENOUS
Status: DISCONTINUED | OUTPATIENT
Start: 2021-09-09 | End: 2021-09-09 | Stop reason: HOSPADM

## 2021-09-09 RX ORDER — OXYCODONE HCL 5 MG/5 ML
5 SOLUTION, ORAL ORAL
Status: COMPLETED | OUTPATIENT
Start: 2021-09-09 | End: 2021-09-09

## 2021-09-09 RX ORDER — SODIUM CHLORIDE, SODIUM LACTATE, POTASSIUM CHLORIDE, CALCIUM CHLORIDE 600; 310; 30; 20 MG/100ML; MG/100ML; MG/100ML; MG/100ML
INJECTION, SOLUTION INTRAVENOUS CONTINUOUS
Status: DISCONTINUED | OUTPATIENT
Start: 2021-09-09 | End: 2021-09-09 | Stop reason: HOSPADM

## 2021-09-09 RX ORDER — KETOROLAC TROMETHAMINE 30 MG/ML
INJECTION, SOLUTION INTRAMUSCULAR; INTRAVENOUS PRN
Status: DISCONTINUED | OUTPATIENT
Start: 2021-09-09 | End: 2021-09-09 | Stop reason: SURG

## 2021-09-09 RX ORDER — OXYCODONE HCL 5 MG/5 ML
5 SOLUTION, ORAL ORAL
Status: DISCONTINUED | OUTPATIENT
Start: 2021-09-09 | End: 2021-09-09 | Stop reason: HOSPADM

## 2021-09-09 RX ORDER — LIDOCAINE HYDROCHLORIDE 10 MG/ML
INJECTION, SOLUTION EPIDURAL; INFILTRATION; INTRACAUDAL; PERINEURAL
Status: COMPLETED
Start: 2021-09-09 | End: 2021-09-09

## 2021-09-09 RX ORDER — DIPHENHYDRAMINE HYDROCHLORIDE 50 MG/ML
25 INJECTION INTRAMUSCULAR; INTRAVENOUS EVERY 6 HOURS PRN
Status: DISCONTINUED | OUTPATIENT
Start: 2021-09-09 | End: 2021-09-09 | Stop reason: HOSPADM

## 2021-09-09 RX ORDER — DIPHENHYDRAMINE HCL 25 MG
25 TABLET ORAL EVERY 6 HOURS PRN
Status: DISCONTINUED | OUTPATIENT
Start: 2021-09-09 | End: 2021-09-09 | Stop reason: HOSPADM

## 2021-09-09 RX ORDER — HALOPERIDOL 5 MG/ML
1 INJECTION INTRAMUSCULAR
Status: DISCONTINUED | OUTPATIENT
Start: 2021-09-09 | End: 2021-09-09 | Stop reason: HOSPADM

## 2021-09-09 RX ORDER — IBUPROFEN 600 MG/1
600 TABLET ORAL EVERY 6 HOURS
Qty: 45 TABLET | Refills: 0 | Status: SHIPPED | OUTPATIENT
Start: 2021-09-09 | End: 2022-08-03

## 2021-09-09 RX ORDER — POLYETHYLENE GLYCOL 3350 17 G/17G
17 POWDER, FOR SOLUTION ORAL DAILY
Qty: 20 EACH | Refills: 0 | Status: SHIPPED | OUTPATIENT
Start: 2021-09-09 | End: 2021-12-15

## 2021-09-09 RX ORDER — ACETAMINOPHEN 500 MG
500-1000 TABLET ORAL EVERY 6 HOURS PRN
Status: ON HOLD | COMMUNITY
End: 2021-09-09

## 2021-09-09 RX ORDER — ONDANSETRON 2 MG/ML
INJECTION INTRAMUSCULAR; INTRAVENOUS PRN
Status: DISCONTINUED | OUTPATIENT
Start: 2021-09-09 | End: 2021-09-09 | Stop reason: SURG

## 2021-09-09 RX ORDER — CEFAZOLIN SODIUM 1 G/3ML
INJECTION, POWDER, FOR SOLUTION INTRAMUSCULAR; INTRAVENOUS PRN
Status: DISCONTINUED | OUTPATIENT
Start: 2021-09-09 | End: 2021-09-09 | Stop reason: SURG

## 2021-09-09 RX ORDER — ONDANSETRON 2 MG/ML
4 INJECTION INTRAMUSCULAR; INTRAVENOUS
Status: COMPLETED | OUTPATIENT
Start: 2021-09-09 | End: 2021-09-09

## 2021-09-09 RX ORDER — OXYCODONE HCL 5 MG/5 ML
10 SOLUTION, ORAL ORAL
Status: COMPLETED | OUTPATIENT
Start: 2021-09-09 | End: 2021-09-09

## 2021-09-09 RX ORDER — MEPERIDINE HYDROCHLORIDE 25 MG/ML
12.5 INJECTION INTRAMUSCULAR; INTRAVENOUS; SUBCUTANEOUS
Status: DISCONTINUED | OUTPATIENT
Start: 2021-09-09 | End: 2021-09-09 | Stop reason: HOSPADM

## 2021-09-09 RX ORDER — SODIUM CHLORIDE, SODIUM LACTATE, POTASSIUM CHLORIDE, CALCIUM CHLORIDE 600; 310; 30; 20 MG/100ML; MG/100ML; MG/100ML; MG/100ML
INJECTION, SOLUTION INTRAVENOUS CONTINUOUS
Status: ACTIVE | OUTPATIENT
Start: 2021-09-09 | End: 2021-09-09

## 2021-09-09 RX ORDER — ROCURONIUM BROMIDE 10 MG/ML
INJECTION, SOLUTION INTRAVENOUS PRN
Status: DISCONTINUED | OUTPATIENT
Start: 2021-09-09 | End: 2021-09-09 | Stop reason: SURG

## 2021-09-09 RX ORDER — DEXAMETHASONE SODIUM PHOSPHATE 4 MG/ML
INJECTION, SOLUTION INTRA-ARTICULAR; INTRALESIONAL; INTRAMUSCULAR; INTRAVENOUS; SOFT TISSUE PRN
Status: DISCONTINUED | OUTPATIENT
Start: 2021-09-09 | End: 2021-09-09 | Stop reason: SURG

## 2021-09-09 RX ORDER — BUPIVACAINE HYDROCHLORIDE AND EPINEPHRINE 5; 5 MG/ML; UG/ML
INJECTION, SOLUTION PERINEURAL
Status: DISCONTINUED | OUTPATIENT
Start: 2021-09-09 | End: 2021-09-09 | Stop reason: HOSPADM

## 2021-09-09 RX ORDER — HYDROMORPHONE HYDROCHLORIDE 1 MG/ML
0.4 INJECTION, SOLUTION INTRAMUSCULAR; INTRAVENOUS; SUBCUTANEOUS
Status: DISCONTINUED | OUTPATIENT
Start: 2021-09-09 | End: 2021-09-09 | Stop reason: HOSPADM

## 2021-09-09 RX ORDER — LIDOCAINE HYDROCHLORIDE 20 MG/ML
INJECTION, SOLUTION EPIDURAL; INFILTRATION; INTRACAUDAL; PERINEURAL PRN
Status: DISCONTINUED | OUTPATIENT
Start: 2021-09-09 | End: 2021-09-09 | Stop reason: SURG

## 2021-09-09 RX ORDER — OXYCODONE HCL 5 MG/5 ML
10 SOLUTION, ORAL ORAL
Status: DISCONTINUED | OUTPATIENT
Start: 2021-09-09 | End: 2021-09-09 | Stop reason: HOSPADM

## 2021-09-09 RX ORDER — DIPHENHYDRAMINE HYDROCHLORIDE 50 MG/ML
12.5 INJECTION INTRAMUSCULAR; INTRAVENOUS
Status: DISCONTINUED | OUTPATIENT
Start: 2021-09-09 | End: 2021-09-09 | Stop reason: HOSPADM

## 2021-09-09 RX ORDER — IPRATROPIUM BROMIDE AND ALBUTEROL SULFATE 2.5; .5 MG/3ML; MG/3ML
3 SOLUTION RESPIRATORY (INHALATION)
Status: DISCONTINUED | OUTPATIENT
Start: 2021-09-09 | End: 2021-09-09 | Stop reason: HOSPADM

## 2021-09-09 RX ORDER — MEPERIDINE HYDROCHLORIDE 25 MG/ML
INJECTION INTRAMUSCULAR; INTRAVENOUS; SUBCUTANEOUS
Status: COMPLETED
Start: 2021-09-09 | End: 2021-09-09

## 2021-09-09 RX ORDER — OXYCODONE HYDROCHLORIDE 5 MG/1
5 TABLET ORAL EVERY 4 HOURS PRN
Qty: 12 TABLET | Refills: 0 | Status: SHIPPED | OUTPATIENT
Start: 2021-09-09 | End: 2021-09-12

## 2021-09-09 RX ORDER — ACETAMINOPHEN 325 MG/1
650 TABLET ORAL EVERY 6 HOURS
Qty: 60 TABLET | Refills: 0 | Status: SHIPPED | OUTPATIENT
Start: 2021-09-09 | End: 2021-12-15

## 2021-09-09 RX ORDER — HYDROMORPHONE HYDROCHLORIDE 1 MG/ML
0.1 INJECTION, SOLUTION INTRAMUSCULAR; INTRAVENOUS; SUBCUTANEOUS
Status: DISCONTINUED | OUTPATIENT
Start: 2021-09-09 | End: 2021-09-09 | Stop reason: HOSPADM

## 2021-09-09 RX ADMIN — FENTANYL CITRATE 100 MCG: 50 INJECTION, SOLUTION INTRAMUSCULAR; INTRAVENOUS at 16:02

## 2021-09-09 RX ADMIN — MEPERIDINE HYDROCHLORIDE 12.5 MG: 25 INJECTION INTRAMUSCULAR; INTRAVENOUS; SUBCUTANEOUS at 17:27

## 2021-09-09 RX ADMIN — LIDOCAINE HYDROCHLORIDE 0.5 ML: 10 INJECTION, SOLUTION EPIDURAL; INFILTRATION; INTRACAUDAL at 15:00

## 2021-09-09 RX ADMIN — SUGAMMADEX 200 MG: 100 INJECTION, SOLUTION INTRAVENOUS at 16:58

## 2021-09-09 RX ADMIN — KETOROLAC TROMETHAMINE 30 MG: 30 INJECTION, SOLUTION INTRAMUSCULAR at 16:58

## 2021-09-09 RX ADMIN — Medication 0.5 ML: at 15:00

## 2021-09-09 RX ADMIN — ONDANSETRON 4 MG: 2 INJECTION INTRAMUSCULAR; INTRAVENOUS at 16:11

## 2021-09-09 RX ADMIN — CEFAZOLIN 2 G: 330 INJECTION, POWDER, FOR SOLUTION INTRAMUSCULAR; INTRAVENOUS at 16:02

## 2021-09-09 RX ADMIN — ONDANSETRON 4 MG: 2 INJECTION INTRAMUSCULAR; INTRAVENOUS at 17:21

## 2021-09-09 RX ADMIN — DEXAMETHASONE SODIUM PHOSPHATE 8 MG: 4 INJECTION, SOLUTION INTRA-ARTICULAR; INTRALESIONAL; INTRAMUSCULAR; INTRAVENOUS; SOFT TISSUE at 16:11

## 2021-09-09 RX ADMIN — PROPOFOL 150 MG: 10 INJECTION, EMULSION INTRAVENOUS at 16:11

## 2021-09-09 RX ADMIN — OXYCODONE HYDROCHLORIDE 10 MG: 5 SOLUTION ORAL at 17:21

## 2021-09-09 RX ADMIN — ROCURONIUM BROMIDE 50 MG: 10 INJECTION, SOLUTION INTRAVENOUS at 16:11

## 2021-09-09 RX ADMIN — LIDOCAINE HYDROCHLORIDE 100 MG: 20 INJECTION, SOLUTION EPIDURAL; INFILTRATION; INTRACAUDAL at 16:11

## 2021-09-09 RX ADMIN — SODIUM CHLORIDE, POTASSIUM CHLORIDE, SODIUM LACTATE AND CALCIUM CHLORIDE: 600; 310; 30; 20 INJECTION, SOLUTION INTRAVENOUS at 15:01

## 2021-09-09 ASSESSMENT — PAIN DESCRIPTION - PAIN TYPE
TYPE: SURGICAL PAIN

## 2021-09-09 ASSESSMENT — FIBROSIS 4 INDEX: FIB4 SCORE: 1.44

## 2021-09-09 ASSESSMENT — PAIN SCALES - GENERAL: PAIN_LEVEL: 0

## 2021-09-09 NOTE — OP REPORT
Operative Report    Date: 9/9/2021    Surgeon: Arias Smith M.D.     Assistant: CORKY Doan    Pre-operative Diagnosis: right Inguinal Hernia    Post-operative Diagnosis: Same     Procedure: Robotic right Inguinal Hernia Repair with Mesh    ASA Classification: II.    Indications: This is a 71 y.o. male who presented with symptoms of right Inguinal Hernia. Here for repair    The indications for a surgical assistant in this surgery were indicated due to complexity of the procedure. Their role included aiding in incision, retraction, holding devices including camera for laparoscopic procedure, and closure of the wound.      Findings: right indirect inguinal hernia    Wound Classification: Class I, I, Clean..    Procedure in detail: The patient was seen and examined in the preoperative holding area.  The risks benefits and alternatives of the procedure were discussed with the patient who wished to proceed with the procedure as described.  The patient was transferred to the operating room placed in supine position and all pressure points were properly padded.  General endotracheal anesthesia was induced and preoperative antibiotics were given per SCIP protocol.  Patient's abdomen was prepped with ChloraPrep and draped in the normal sterile fashion.  A timeout was performed confirming correct patient, correct procedure, and that all necessary equipment was in the room.      We began the procedure by performing a periumbilical Optiview approach.  The area for our camera port was identified and infused with local anesthetic, local anesthetic was subsequently infused over all port sites.  The skin was sharply incised and the 5 mm port was used to gain access to the abdomen.  Pneumoperitoneum was then achieved and maintained at 15 mmHg carbon dioxide throughout the entirety of the case.  Under direct visualization a right and left working port were placed with the 8 mm robotic port.  We then exchanged our 5 mm port  for an 8 mm robotic port. the robot was then docked.  We began by identifying an area approximately 8 cm from the inguinal hernia and the peritoneum was sharply incised.  Using combination of blunt, electrocautery, and sharp dissection we were able to dissect the peritoneal flap down to the inguinal canal.  Careful dissection was completed medially to identify the pubic symphysis and carried this down to the obturator canal.  The corona mortise was identified and preserved.  We then continued dissection laterally until we encountered the psoas muscle ensuring that we maintain the iliopubic tract against the abdominal wall. An indirect hernia sac was identified.  Careful dissection was used to free the indirect inguinal hernia sac ensuring that the gonadal structures were carefully identified and preserved throughout the dissection of the hernia sac.  We carried our dissection down until the peritoneum was well below our area of mesh placement.  Any identified cord lipoma was carefully dissected free and reduced into the preperitoneal space..     The 15 x 10 pro- hernia mesh was selected and introduced.  This was then laid into the dissected peritoneal flap ensuring good coverage medially down the pubic symphysis over the midline covering the  space as well as good overlap over the inguinal canal anatomy.  The peritoneal flap was manipulated to ensure that the mesh did not move.    The peritoneal flap was then closed with a 2-0 strata fix suture.  The perineal flap was then carefully inspected and any identified rents were closed carefully using the same 2-0 strata fix suture.  The robot was then undocked and the ports were carefully removed.  Skin was then closed with 4-0 Monocryl in a subcuticular fashion and Dermabond was placed over the wounds.    The patient was awakened from general anesthetic, and was taken to the recovery room in stable condition.    Sponge and needle counts were correct at the  end of the case.     Specimen: none    EBL: 20mL    Dispo: stable, extubated, to PACU    Arias Smith M.D.  [facility]  [phone]
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
## 2021-09-09 NOTE — PROGRESS NOTES
Patient in pre-op, Covid Test is negative,  assessment completed, patient and wife Jeanupdated on plan of care, all questions answered, no further needs at this time, call light within reach.

## 2021-09-09 NOTE — ANESTHESIA PROCEDURE NOTES
Peripheral IV    Date/Time: 9/9/2021 4:09 PM  Performed by: Shay Saleem M.D.  Authorized by: Shay Saleem M.D.     Size:  20 G  Laterality:  Left  Local Anesthetic:  None  Site Prep:  Alcohol  Technique:  Direct puncture  Attempts:  1  Difficult IV necessitating physician skill: IV access difficult    Ultrasound Guidance: Yes

## 2021-09-09 NOTE — ANESTHESIA PROCEDURE NOTES
Airway    Date/Time: 9/9/2021 4:11 PM  Performed by: Shay Saleem M.D.  Authorized by: Shay Saleem M.D.     Location:  OR  Urgency:  Elective  Indications for Airway Management:  Anesthesia      Spontaneous Ventilation: absent    Sedation Level:  Deep  Preoxygenated: Yes    Patient Position:  Sniffing  Mask Difficulty Assessment:  0 - not attempted  Final Airway Type:  Endotracheal airway  Final Endotracheal Airway:  ETT  Cuffed: Yes    Technique Used for Successful ETT Placement:  Direct laryngoscopy    Insertion Site:  Oral  Blade Type:  Currie  Laryngoscope Blade/Videolaryngoscope Blade Size:  2  ETT Size (mm):  7.0  Measured from:  Teeth  ETT to Teeth (cm):  23  Placement Verified by: auscultation and capnometry    Cormack-Lehane Classification:  Grade I - full view of glottis  Number of Attempts at Approach:  1

## 2021-09-10 NOTE — ANESTHESIA POSTPROCEDURE EVALUATION
Patient: Rob Clifton    Procedure Summary     Date: 09/09/21 Room / Location: [place] [address] / SURGERY [facility]    Anesthesia Start: 1552 Anesthesia Stop: 1712    Procedure: REPAIR, HERNIA, INGUINAL, ROBOT-ASSISTED, USING DA VICTORINA XI - RIGHT, POSSIBLE BILATERAL, WITH MESH (Right Abdomen) Diagnosis: (RIGHT INGUINAL HERNIA)    Surgeons: Arias Smith M.D. Responsible Provider: Shay Saleem M.D.    Anesthesia Type: general ASA Status: 2          Final Anesthesia Type: general  Last vitals  BP   Blood Pressure : 109/71    Temp   36.3 °C (97.4 °F)    Pulse   71   Resp   16    SpO2   97 %      Anesthesia Post Evaluation    Patient location during evaluation: PACU  Patient participation: complete - patient participated  Level of consciousness: awake and alert  Pain score: 0    Airway patency: patent  Anesthetic complications: no  Cardiovascular status: hemodynamically stable  Respiratory status: acceptable  Hydration status: euvolemic    PONV: none          No complications documented.     Nurse Pain Score: 0 (NPRS)

## 2021-09-10 NOTE — OR NURSING
1705 - Pt arrived to PACU 17B. Pt sleepy but denies pain and nausea. Lap sites CD&I.    1715 - Pt more awake and c/o pain and nausea. Pt also shivering. Will treat per MAR.    1730 - Pt wife, Haris, updated.    1745 - Pt states that he is feeling better and pain is tolerable.

## 2021-09-10 NOTE — OR NURSING
1808 Pt's VSS; denies N/V; states pain is at tolerable level. Dressing CDI to ABD x 4.      1848  D/c orders received. IV dc'd. Pt changed into clothing with assistance. Pt up and ambulated to BR, steady gait, voided adequately. Discharge instructions given as well as pain management handout; pt and family verbalized understanding and questions answered. Patient states ready to d/c home. Pt dc'd in w/c with RN  in stable condition.

## 2021-09-10 NOTE — ANESTHESIA TIME REPORT
Anesthesia Start and Stop Event Times     Date Time Event    9/9/2021 1550 Ready for Procedure     1552 Anesthesia Start     1712 Anesthesia Stop        Responsible Staff  09/09/21    Name Role Begin End    Shay Saleem M.D. Anesth 1552 1712        Preop Diagnosis (Free Text):  Pre-op Diagnosis     RIGHT INGUINAL HERNIA        Preop Diagnosis (Codes):    Post op Diagnosis  Inguinal hernia      Premium Reason  A. 3PM - 7AM    Comments:

## 2021-09-10 NOTE — DISCHARGE INSTRUCTIONS
Inguinal Discharge Instructions:    ACTIVITIES: Upon discharge from the hospital, the day of surgery it is requested that you do no significant physical activity and limit mental activities, as you have had sedation. The day after surgery, you may resume activities of daily living, but for four weeks, it is recommended that you do no strenuous activities or heavy lifting (greater than 15 pounds).     DRIVING: You may drive whenever you are off pain medications and are able to perform the activities needed to drive, i.e. turning, bending, twisting, etc.     WOUND: It is not unusual for patients to experience swelling and even bruising at the hernia repair site. With inguinal hernias, sometimes the bruising and swelling may extend on to the penis or into the scrotum of male patients. This will resolve over the next few days.     ICE: please use ice on the wound to decrease the swelling for the first 24 hours and then discontinue.     BATHING: The dressing can be removed 48 hours after surgery and the wound can then be wetted in a shower as normal, but avoid submersion in water (tub bath) for at least 2 weeks.    PAIN MEDICATION: You will be given a prescription for pain medication at discharge. Please take these as directed. It is important to remember not to take medications on an empty stomach as this may cause nausea.     BOWEL FUNCTION: After hernia repair, it is not uncommon for patients to experience constipation. This is due to decreasing activity levels as well as pain medications. You may wish to use a stool softener beginning immediately after surgery, and you may or may not need to use a laxative (Milk of Magnesia, Ex-lax; Senokot, etc.) as well.            CALL IF YOU HAVE: Drainage or fluid from incision that may be foul smelling,  increased tenderness or soreness at the wound or the wound edges are no  longer together,redness or swelling at the incision site. Please do not hesitate to  call with any  other questions.     APPOINTMENT: Contact our office at [phone] for a follow-up appointment in 1 week following your procedure.     If you have any additional questions, please do not hesitate to call the office.     ACTIVITY: Rest and take it easy for the first 24 hours.  A responsible adult is recommended to remain with you during that time.  It is normal to feel sleepy.  We encourage you to not do anything that requires balance, judgment or coordination.    MILD FLU-LIKE SYMPTOMS ARE NORMAL. YOU MAY EXPERIENCE GENERALIZED MUSCLE ACHES, THROAT IRRITATION, HEADACHE AND/OR SOME NAUSEA.    FOR 24 HOURS DO NOT:  Drive, operate machinery or run household appliances.  Drink beer or alcoholic beverages.   Make important decisions or sign legal documents.    DIET: To avoid nausea, slowly advance diet as tolerated, avoiding spicy or greasy foods for the first day.  Add more substantial food to your diet according to your physician's instructions.  INCREASE FLUIDS AND FIBER TO AVOID CONSTIPATION.    You should CALL YOUR PHYSICIAN if you develop:  Fever greater than 101 degrees F.  Pain not relieved by medication, or persistent nausea or vomiting.  Excessive bleeding (blood soaking through dressing) or unexpected drainage from the wound.  Extreme redness or swelling around the incision site, drainage of pus or foul smelling drainage.  Inability to urinate or empty your bladder within 8 hours.  Problems with breathing or chest pain.    You should call 911 if you develop problems with breathing or chest pain.  If you are unable to contact your doctor or surgical center, you should go to the nearest emergency room or urgent care center.      Physician's telephone #: [phone] Dr Smith    If any questions arise, call your doctor.  If your doctor is not available, please feel free to call the Surgical Center at [phone]. The Contact Center is open Monday through Friday 7AM to 5PM and may speak to a nurse at  [phone], or toll free at [phone].     A registered nurse may call you a few days after your surgery to see how you are doing after your procedure.    MEDICATIONS: Resume taking daily medication.  Take prescribed pain medication with food.  If no medication is prescribed, you may take non-aspirin pain medication if needed.  PAIN MEDICATION CAN BE VERY CONSTIPATING.  Take a stool softener or laxative such as senokot, pericolace, or milk of magnesia if needed.    Prescription given for Oxycodone, Motrin and Tylenol sent to [facility] [address] [facility] in [place].    Last pain medication given at Oxycodone 10mg at 5:21pm.    If your physician has prescribed pain medication that includes Acetaminophen (Tylenol), do not take additional Acetaminophen (Tylenol) while taking the prescribed medication.    Depression / Suicide Risk    As you are discharged from this [facility] facility, it is important to learn how to keep safe from harming yourself.    Recognize the warning signs:  · Abrupt changes in personality, positive or negative- including increase in energy   · Giving away possessions  · Change in eating patterns- significant weight changes-  positive or negative  · Change in sleeping patterns- unable to sleep or sleeping all the time   · Unwillingness or inability to communicate  · Depression  · Unusual sadness, discouragement and loneliness  · Talk of wanting to die  · Neglect of personal appearance   · Rebelliousness- reckless behavior  · Withdrawal from people/activities they love  · Confusion- inability to concentrate     If you or a loved one observes any of these behaviors or has concerns about self-harm, here's what you can do:  · Talk about it- your feelings and reasons for harming yourself  · Remove any means that you might use to hurt yourself (examples: pills, rope, extension cords, firearm)  · Get professional help from the community (Mental Health, Substance Abuse,  psychological counseling)  · Do not be alone:Call your Safe Contact- someone whom you trust who will be there for you.  · Call your local CRISIS HOTLINE 025-2658 or 308-746-0258  · Call your local Children's Mobile Crisis Response Team [place] (992) 104-1250 or www.[facility]  · Call the toll free National Suicide Prevention Hotlines   · National Suicide Prevention Lifeline 115-617-HGWF (0406)  · National Hope Line Network 800-SUICIDE (800-8429)

## 2021-09-29 DIAGNOSIS — N52.8 OTHER MALE ERECTILE DYSFUNCTION: ICD-10-CM

## 2021-09-29 RX ORDER — SILDENAFIL 100 MG/1
100 TABLET, FILM COATED ORAL PRN
Qty: 90 TABLET | Refills: 0 | Status: SHIPPED | OUTPATIENT
Start: 2021-09-29 | End: 2022-10-12 | Stop reason: SDUPTHER

## 2021-10-06 ENCOUNTER — OFFICE VISIT (OUTPATIENT)
Dept: MEDICAL GROUP | Facility: PHYSICIAN GROUP | Age: 71
End: 2021-10-06
Payer: MEDICARE

## 2021-10-06 VITALS
OXYGEN SATURATION: 97 % | DIASTOLIC BLOOD PRESSURE: 72 MMHG | SYSTOLIC BLOOD PRESSURE: 112 MMHG | TEMPERATURE: 97.7 F | BODY MASS INDEX: 27.44 KG/M2 | HEIGHT: 71 IN | RESPIRATION RATE: 16 BRPM | WEIGHT: 196 LBS | HEART RATE: 60 BPM

## 2021-10-06 DIAGNOSIS — Z23 NEED FOR VACCINATION: ICD-10-CM

## 2021-10-06 DIAGNOSIS — G56.03 BILATERAL CARPAL TUNNEL SYNDROME: ICD-10-CM

## 2021-10-06 PROCEDURE — 90662 IIV NO PRSV INCREASED AG IM: CPT | Performed by: PHYSICIAN ASSISTANT

## 2021-10-06 PROCEDURE — 99213 OFFICE O/P EST LOW 20 MIN: CPT | Mod: 25 | Performed by: PHYSICIAN ASSISTANT

## 2021-10-06 PROCEDURE — G0008 ADMIN INFLUENZA VIRUS VAC: HCPCS | Performed by: PHYSICIAN ASSISTANT

## 2021-10-06 ASSESSMENT — FIBROSIS 4 INDEX: FIB4 SCORE: 1.44

## 2021-10-06 NOTE — PROGRESS NOTES
Chief Complaint   Patient presents with   • Numbness     tingling and numbess in mainly right hand x a few weeks       HISTORY OF PRESENT ILLNESS: Rob Clifton is an established 71 y.o. male here to discuss the evaluation and management of:    Patient is a pleasant 71-year-old male here today to discuss numbness and tingling of bilateral left thumbs, pointer and middle fingers.  Patient states numbness and tingling predominately occurs at night and has been happening over the past couple weeks consistently and he feels symptoms are gradually getting worse.  States due to numbness and tingling it is waking him up at night and he has to reposition his hands.  States his entire thumb will be numb and tingling and pointer and middle finger from PIP joint up to the tip of the finger is numb and tingling.  States left hand is predominantly worse than right hand.  States left hand numbness occurs nightly and right hand numbness has occurred a couple times over the past few weeks.  He admits that numbness and tingling of these digits can intermittently occur throughout the day.  He tells me that he sleeps with hands in a praying position underneath his head while lying on his side.  He denies neck pain, upper back pain, headache, facial drooping, slurred speech, unexplained confusion, drooping of eye, upper extremity weakness, muscle atrophy, decreased  strength.  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  He has not used over-the-counter at home treatments alleviate symptoms.      Patient Active Problem List    Diagnosis Date Noted   • Postoperative pain 09/09/2021   • Erectile dysfunction 01/15/2021   • Recurrent kidney stones 12/23/2020   • Benign prostatic hyperplasia with nocturia 12/23/2020   • Arteriosclerosis of aorta (HCC) 12/23/2020   • Ectatic aorta (HCC) 12/23/2020   • Diverticulosis 12/23/2020   • Renal cyst, right 12/23/2020   • Murmur 04/16/2020   •  Vitamin B12 deficiency 2018   • Pure hypercholesterolemia 2017   • History of trauma 2017   • Arthritis 2017   • Bunion of great toe 2017       Allergies:Bactrim ds    Current Outpatient Medications   Medication Sig Dispense Refill   • sildenafil citrate (VIAGRA) 100 MG tablet Take 1 Tablet by mouth as needed. 90 Tablet 0   • acetaminophen (TYLENOL) 325 MG Tab Take 2 Tablets by mouth every 6 hours. Alternate w/ ibuprofen to take a medication every 3 hrs, take scheduled for 3 days post-op then PRN 60 Tablet 0   • ibuprofen (MOTRIN) 600 MG Tab Take 1 Tablet by mouth every 6 hours. Alternate w/ tylenol to take a medication every 3 hrs, take scheduled for 3 days post-op then PRN 45 Tablet 0   • polyethylene glycol/lytes (MIRALAX) 17 g Pack Take 1 Packet by mouth every day. While taking narcotics, hold if greater then 3 BMs a day 20 Each 0   • cyanocobalamin (VITAMIN B-12) 100 MCG Tab Take 100 mcg by mouth every 48 hours.     • Naproxen Sodium (ALEVE) 220 MG Cap Take 2 Caps by mouth every day.     • Omega-3 Fatty Acids (FISH OIL) 1200 MG Cap Take 3 Caps by mouth every day.     • Misc Natural Products (LUTEIN 20 PO) Take 20 mg by mouth every day.       No current facility-administered medications for this visit.       Social History     Tobacco Use   • Smoking status: Never Smoker   • Smokeless tobacco: Never Used   Vaping Use   • Vaping Use: Never used   Substance Use Topics   • Alcohol use: Yes     Alcohol/week: 0.6 oz     Types: 1 Glasses of wine per week     Comment: 1-2 daily   • Drug use: Yes     Types: Marijuana, Oral, Inhaled     Comment: marijuana occ., last use 1 week ago        Family Status   Relation Name Status   • Mo     • Fa     • MGMo     • MGFa     • PGMo     • PGFa     • Neg Hx  (Not Specified)     Family History   Problem Relation Age of Onset   • No Known Problems Mother    • No Known Problems Father    • Other Maternal  "Grandmother         Possibly TB   • No Known Problems Maternal Grandfather    • No Known Problems Paternal Grandmother    • No Known Problems Paternal Grandfather    • Heart Disease Neg Hx    • Cancer Neg Hx    • Diabetes Neg Hx    • Stroke Neg Hx        ROS:  Review of Systems   Constitutional: Negative for fever, chills, weight loss and malaise/fatigue.   HENT: Negative for ear pain, nosebleeds, congestion, sore throat and neck pain.    Eyes: Negative for blurred vision.   Respiratory: Negative for cough, sputum production, shortness of breath and wheezing.    Cardiovascular: Negative for chest pain, palpitations, orthopnea and leg swelling.   Gastrointestinal: Negative for heartburn, nausea, vomiting and abdominal pain.   Genitourinary: Negative for dysuria, urgency and frequency.   Musculoskeletal: Negative for myalgias, back pain and joint pain.   Skin: Negative for rash and itching.   Neurological: Negative for dizziness, tremors, sensory change, focal weakness and headaches.   Endo/Heme/Allergies: Does not bruise/bleed easily.   Psychiatric/Behavioral: Negative for depression, suicidal ideas and memory loss.  The patient is not nervous/anxious and does not have insomnia.    All other systems reviewed and are negative except as in HPI.    Exam: /72 (BP Location: Left arm, Patient Position: Sitting, BP Cuff Size: Adult)   Pulse 60   Temp 36.5 °C (97.7 °F) (Temporal)   Resp 16   Ht 1.803 m (5' 11\")   Wt 88.9 kg (196 lb)   SpO2 97%  Body mass index is 27.34 kg/m².  General: Normal appearing. No distress.  HEENT: Normocephalic. Eyes conjunctiva clear lids without ptosis, ears normal shape and contour.  Neck: Supple without JVD. Thyroid is not enlarged.  Pulmonary: Clear to ausculation.  Normal effort. No rales, ronchi, or wheezing.  Cardiovascular: Regular rate and rhythm without murmur.   Abdomen: Nondistended.  Neurologic: Grossly nonfocal.  Cranial nerves are normal.   Skin: Warm and dry.  No rashes " or suspicious skin lesions.  Musculoskeletal: Normal gait. No extremity cyanosis, clubbing, or edema.  Positive Phalen's and Tinel's test bilaterally.   strength is equal bilaterally.  Negative for thenar atrophy of right and left hands.  Normal range of motion of upper extremities.  Posterior neck and upper back is nontender to palpation.  Cervical spine and thoracic spine is nontender to palpation.  Psych: Normal mood and affect. Alert and oriented x3. Judgment and insight is normal.    Medical decision-making and discussion:  1. Bilateral carpal tunnel syndrome  Discussed carpal tunnel syndrome with patient.  Advised patient to decrease repetitive movements.  Advised patient if possible to change the position that he sleeps to avoid compression of median nerve.  Advised patient to use a carpal tunnel wrist splint at night and during the day as needed.  Use over-the-counter analgesics as needed.  Patient has been referred to physiatry for further evaluation if symptoms do not improve.    - REFERRAL TO OUTPATIENT INTERVENTIONAL PAIN CLINIC    2. Need for vaccination  A flu vaccination was administered to patient without complication. A VIS form was provided to patient.     - INFLUENZA VACCINE, HIGH DOSE (65+ ONLY)      Please note that this dictation was created using voice recognition software. I have made every reasonable attempt to correct obvious errors, but I expect that there are errors of grammar and possibly content that I did not discover before finalizing the note.    Assessment/Plan:  1. Bilateral carpal tunnel syndrome  REFERRAL TO OUTPATIENT INTERVENTIONAL PAIN CLINIC   2. Need for vaccination  INFLUENZA VACCINE, HIGH DOSE (65+ ONLY)       Return if symptoms worsen or fail to improve.   0

## 2021-10-19 ENCOUNTER — OFFICE VISIT (OUTPATIENT)
Dept: PHYSICAL MEDICINE AND REHAB | Facility: MEDICAL CENTER | Age: 71
End: 2021-10-19
Payer: MEDICARE

## 2021-10-19 VITALS
BODY MASS INDEX: 28.12 KG/M2 | SYSTOLIC BLOOD PRESSURE: 110 MMHG | HEART RATE: 70 BPM | DIASTOLIC BLOOD PRESSURE: 78 MMHG | TEMPERATURE: 97.3 F | WEIGHT: 200.84 LBS | HEIGHT: 71 IN | OXYGEN SATURATION: 95 %

## 2021-10-19 DIAGNOSIS — G56.03 BILATERAL CARPAL TUNNEL SYNDROME: ICD-10-CM

## 2021-10-19 PROCEDURE — 99203 OFFICE O/P NEW LOW 30 MIN: CPT | Performed by: STUDENT IN AN ORGANIZED HEALTH CARE EDUCATION/TRAINING PROGRAM

## 2021-10-19 ASSESSMENT — PATIENT HEALTH QUESTIONNAIRE - PHQ9: CLINICAL INTERPRETATION OF PHQ2 SCORE: 0

## 2021-10-19 ASSESSMENT — FIBROSIS 4 INDEX: FIB4 SCORE: 1.44

## 2021-10-19 ASSESSMENT — PAIN SCALES - GENERAL: PAINLEVEL: NO PAIN

## 2021-10-19 NOTE — Clinical Note
Zacarias Meza,    Thanks for referring Rob Clifton to my office. My current impression is bilateral carpal tunnel syndrome.  I encouraged him to wear bilateral wrist splints at night which he had not been doing.  I also discussed possible EMG/NCS and referral to physical therapy which he declined since his symptoms are already improving with changing his sleep positioning without wrist splints.  I counseled him that he may continue taking NSAIDs for a short term but avoid long-term use of NSAIDs due to risk of renal injury.    I will see him back as needed.  I counseled him that if his symptoms were to worsen or persist, he may contact my office for a referral for EMG/NCS and physical therapy.      Thanks and please let me know if you have any questions.    Jo Vargas MD  Interventional Pain Management  [facility]
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
## 2021-12-15 ENCOUNTER — OFFICE VISIT (OUTPATIENT)
Dept: MEDICAL GROUP | Facility: PHYSICIAN GROUP | Age: 71
End: 2021-12-15
Payer: MEDICARE

## 2021-12-15 VITALS
WEIGHT: 201.2 LBS | SYSTOLIC BLOOD PRESSURE: 116 MMHG | HEIGHT: 71 IN | BODY MASS INDEX: 28.17 KG/M2 | HEART RATE: 67 BPM | RESPIRATION RATE: 12 BRPM | TEMPERATURE: 97.3 F | OXYGEN SATURATION: 96 % | DIASTOLIC BLOOD PRESSURE: 86 MMHG

## 2021-12-15 DIAGNOSIS — Z76.89 ENCOUNTER TO ESTABLISH CARE: ICD-10-CM

## 2021-12-15 DIAGNOSIS — K57.90 DIVERTICULOSIS: ICD-10-CM

## 2021-12-15 DIAGNOSIS — N52.9 ERECTILE DYSFUNCTION, UNSPECIFIED ERECTILE DYSFUNCTION TYPE: ICD-10-CM

## 2021-12-15 DIAGNOSIS — R35.1 BENIGN PROSTATIC HYPERPLASIA WITH NOCTURIA: ICD-10-CM

## 2021-12-15 DIAGNOSIS — E78.00 PURE HYPERCHOLESTEROLEMIA: ICD-10-CM

## 2021-12-15 DIAGNOSIS — R55 SYNCOPE, UNSPECIFIED SYNCOPE TYPE: ICD-10-CM

## 2021-12-15 DIAGNOSIS — N40.1 BENIGN PROSTATIC HYPERPLASIA WITH NOCTURIA: ICD-10-CM

## 2021-12-15 PROCEDURE — 93000 ELECTROCARDIOGRAM COMPLETE: CPT | Performed by: STUDENT IN AN ORGANIZED HEALTH CARE EDUCATION/TRAINING PROGRAM

## 2021-12-15 PROCEDURE — 99214 OFFICE O/P EST MOD 30 MIN: CPT | Performed by: STUDENT IN AN ORGANIZED HEALTH CARE EDUCATION/TRAINING PROGRAM

## 2021-12-15 ASSESSMENT — FIBROSIS 4 INDEX: FIB4 SCORE: 1.44

## 2021-12-15 NOTE — PROGRESS NOTES
Subjective:     CC:  Diagnoses of Syncope, unspecified syncope type, Encounter to establish care, Pure hypercholesterolemia, Benign prostatic hyperplasia with nocturia, Diverticulosis, and Erectile dysfunction, unspecified erectile dysfunction type were pertinent to this visit.     Repeat blood pressure left arm 98/76, right arm 110/80.    HISTORY OF THE PRESENT ILLNESS: Patient is a 71 y.o. male. This pleasant patient is here today to establish care and discuss recent lightheadedness and one episode of syncope.. His prior PCP was Susana Aly PA-C.    1.  Lightheadedness and recent episode of passing out.  This began about mid November when the patient returned from a trip to [place].  He noted that the weather was very hot and he spent a great deal of time outdoors doing athletic activities.  Since returning to the [place] he has noticed many episodes of lightheadedness where he felt as if the he might lose his balance or pass out. Moving rapidly from sitting to standing induces the lightheadedness symptoms.      With one episode he was at the gym and after his workout he did have loss of consciousness with a fall to the ground.  He quickly regained consciousness and was assisted to his feet by another gym member and rapidly recovered and felt fine other than some lingering headache that may have been associated with his impact with the ground.      2.  Pure hypercholesterolemia  ASCVD score of 13.6% patient is not currently under treatment.    3.  BPH nocturia  Longstanding.  This is at baseline.  The patient indicates that he usually has to urinate 2-3 times per night.  This is not changed for many years.  He feels that he is still able to get adequate sleep.    4.  Diverticulosis  Longstanding, finding on colonoscopy.  Patient is asymptomatic.    5.  ED  Longstanding.  At baseline.  Well treated with sildenafil citrate.    Active Diagnosis:    Patient Active Problem List   Diagnosis   • Pure  "hypercholesterolemia   • History of trauma   • Arthritis   • Bunion of great toe   • Vitamin B12 deficiency   • Murmur   • Recurrent kidney stones   • Benign prostatic hyperplasia with nocturia   • Arteriosclerosis of aorta (HCC)   • Ectatic aorta (HCC)   • Diverticulosis   • Renal cyst, right   • Erectile dysfunction   • Postoperative pain   • Syncope          Current Outpatient Medications Ordered in Epic   Medication Sig Dispense Refill   • sildenafil citrate (VIAGRA) 100 MG tablet Take 1 Tablet by mouth as needed. 90 Tablet 0   • ibuprofen (MOTRIN) 600 MG Tab Take 1 Tablet by mouth every 6 hours. Alternate w/ tylenol to take a medication every 3 hrs, take scheduled for 3 days post-op then PRN 45 Tablet 0   • Omega-3 Fatty Acids (FISH OIL) 1200 MG Cap Take 3 Capsules by mouth every day. 2 capsules daily     • Misc Natural Products (LUTEIN 20 PO) Take 20 mg by mouth every day.       No current [facility]-ordered facility-administered medications on file.     ROS:   Gen: No fevers/chills, no changes in weight  HEENT: No changes in vision/hearing, sore throat.  Pulm: No cough, unexplained SOB.  CV: No chest pain/pressure, no palpitations.  No diaphoresis.  See HPI.  GI: No nausea/vomiting, no diarrhea  : No dysuria/nocturia  MSk: No myalgias  Skin: No rash/skin changes  Neuro: See HPI.  Heme/Lymph: no easy bruising      Objective:     Exam: /86 (BP Location: Left arm, Patient Position: Standing, BP Cuff Size: Adult)   Pulse 67   Temp 36.3 °C (97.3 °F) (Temporal)   Resp 12   Ht 1.803 m (5' 11\")   Wt 91.3 kg (201 lb 3.2 oz)   SpO2 96%  Body mass index is 28.06 kg/m².    General: Normal appearing. No distress.  HEENT: Normocephalic. Eyes conjunctiva clear lids without ptosis, pupils equal and reactive to light accommodation. Ears normal shape and contour, canals are clear bilaterally, tympanic membranes are benign, nasal mucosa benign, oropharynx is without erythema, edema or exudates.   Neck: Supple without " JVD or bruit. Thyroid is not enlarged.  Pulmonary: Clear to ausculation.  Normal effort. No rales, ronchi, or wheezing.  Cardiovascular: Regular rate and rhythm.  No murmur could be discerned.  Radial pulses are intact and equal bilaterally.  Abdomen: Soft, nondistended.  Neurologic: Grossly nonfocal.  CN II through XII intact.  Lymph: No cervical or supraclavicular lymph nodes are palpable  Skin: Warm and dry.  No obvious lesions.  Musculoskeletal: Normal gait. No extremity cyanosis, clubbing, or edema.  Psych: Normal mood and affect. Alert and oriented x3. Judgment and insight are normal.    A chaperone was offered to the patient during today's exam. Patient declined chaperone.    Labs:   12/15/2021:  -Orthostatics, lying down 108/78 heart rate 57, standing 110/86 heart rate 73, standing at a minute 116/86 heart rate 67 with a noted moment of bradycardia down to the low 50s.  -EKG, sinus rhythm with first-degree AV block.  This is consistent with previous EKGs.    4/16/2020:  -CBC, hematocrit noted at 40.5    12/9/2020:  -CMP, WNL      Assessment & Plan:   71 y.o. male with the following -    1.  Syncope  -Undiagnosed new problem with uncertain prognosis  -DDx: Orthostatic hypotension versus cardiac etiology in both cases likely complicated by exercise and dehydration.  Despite today's orthostatic measurements I suspect this patient has some degree of orthostatic hypotension combined with his first-degree AV block.  These conditions combined with a state of dehydration are likely resulting in transient hypotension and syncope.  -I recommended the patient remain well-hydrated and increase his sodium intake.  -Refer to cardiology.    2.  Pure hypercholesterolemia  -Chronic, stable/untreated.  -I discussed options for treatment with Mr. Clifton.  He does not wish to begin treatment at this point however he will keep this in consideration.    3.  BPH with nocturia  -Chronic, stable.  At baseline.  -Patient does not  wish to pursue any further treatment at this point in time.    4.  Diverticulosis  -Chronic, stable.  No treatment indicated at this time we will  -Follow with scheduled colorectal cancer screening due in 2025.    5.  ED  -Chronic, stable.  Well treated.  -Sildenafil 100 mg as needed    6.  Healthcare maintenance  -Discussed healthy diet/exercise/weight events.  We discussed the need for biannual dentistry visits and to always wear a seatbelt.  -Patient is up-to-date on vaccines and healthcare screening.      Return in about 3 months (around 3/15/2022).    Please note that this dictation was created using voice recognition software. I have made every reasonable attempt to correct obvious errors, but I expect that there are errors of grammar and possibly content that I did not discover before finalizing the note.      Otto An PA-C 12/15/2021

## 2022-01-13 ENCOUNTER — TELEPHONE (OUTPATIENT)
Dept: CARDIOLOGY | Facility: MEDICAL CENTER | Age: 72
End: 2022-01-13

## 2022-01-13 NOTE — TELEPHONE ENCOUNTER
Spoke to patient in regards to records for NP appointment with Dr. Rocha. Patient has never been treated by a cardiologist, all recent records in epic including blood work, cardiac testing, and EKG. Confirmed appt date, time and location.

## 2022-01-26 ENCOUNTER — OFFICE VISIT (OUTPATIENT)
Dept: CARDIOLOGY | Facility: MEDICAL CENTER | Age: 72
End: 2022-01-26
Payer: MEDICARE

## 2022-01-26 VITALS
OXYGEN SATURATION: 95 % | HEART RATE: 72 BPM | WEIGHT: 203.3 LBS | HEIGHT: 71 IN | SYSTOLIC BLOOD PRESSURE: 100 MMHG | DIASTOLIC BLOOD PRESSURE: 80 MMHG | BODY MASS INDEX: 28.46 KG/M2 | RESPIRATION RATE: 14 BRPM

## 2022-01-26 DIAGNOSIS — R55 SYNCOPE AND COLLAPSE: ICD-10-CM

## 2022-01-26 DIAGNOSIS — E78.5 DYSLIPIDEMIA: ICD-10-CM

## 2022-01-26 PROCEDURE — 99214 OFFICE O/P EST MOD 30 MIN: CPT | Performed by: INTERNAL MEDICINE

## 2022-01-26 ASSESSMENT — ENCOUNTER SYMPTOMS
WHEEZING: 0
IRREGULAR HEARTBEAT: 0
FALLS: 0
MYALGIAS: 0
PALPITATIONS: 0
COUGH: 0
SORE THROAT: 0
DIZZINESS: 0
NAUSEA: 0
BACK PAIN: 0
PARESTHESIAS: 0
DOUBLE VISION: 0
PND: 0
ORTHOPNEA: 0
DECREASED APPETITE: 0
FEVER: 0
SLEEP DISTURBANCES DUE TO BREATHING: 0
NUMBNESS: 0
DIAPHORESIS: 0
FLANK PAIN: 0
DYSPNEA ON EXERTION: 0
BLURRED VISION: 0
LOSS OF BALANCE: 0
DIARRHEA: 0
BLOATING: 0
NEAR-SYNCOPE: 0
SHORTNESS OF BREATH: 0
SYNCOPE: 1
HEADACHES: 0
VOMITING: 0
EXCESSIVE DAYTIME SLEEPINESS: 0
WEAKNESS: 0
CONSTIPATION: 0
LIGHT-HEADEDNESS: 0
NIGHT SWEATS: 0

## 2022-01-26 ASSESSMENT — FIBROSIS 4 INDEX: FIB4 SCORE: 1.44
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
## 2022-01-31 ENCOUNTER — HOSPITAL ENCOUNTER (OUTPATIENT)
Dept: LAB | Facility: MEDICAL CENTER | Age: 72
End: 2022-01-31
Attending: INTERNAL MEDICINE
Payer: MEDICARE

## 2022-01-31 DIAGNOSIS — E78.5 DYSLIPIDEMIA: ICD-10-CM

## 2022-01-31 LAB
CHOLEST SERPL-MCNC: 207 MG/DL (ref 100–199)
FASTING STATUS PATIENT QL REPORTED: NORMAL
HDLC SERPL-MCNC: 55 MG/DL
LDLC SERPL CALC-MCNC: 137 MG/DL
TRIGL SERPL-MCNC: 76 MG/DL (ref 0–149)

## 2022-01-31 PROCEDURE — 80061 LIPID PANEL: CPT

## 2022-01-31 PROCEDURE — 36415 COLL VENOUS BLD VENIPUNCTURE: CPT

## 2022-02-08 ENCOUNTER — TELEPHONE (OUTPATIENT)
Dept: CARDIOLOGY | Facility: MEDICAL CENTER | Age: 72
End: 2022-02-08

## 2022-02-08 DIAGNOSIS — E78.5 DYSLIPIDEMIA: ICD-10-CM

## 2022-02-08 RX ORDER — ROSUVASTATIN CALCIUM 10 MG/1
10 TABLET, COATED ORAL EVERY EVENING
Qty: 60 TABLET | Refills: 0 | Status: SHIPPED
Start: 2022-02-08 | End: 2022-03-11

## 2022-02-08 NOTE — TELEPHONE ENCOUNTER
[facility]    Pt called in regards to a letter he received to call [facility] nurse. Tried reaching nurse, but unavailable. Please call Pt back at [phone].    Thank you

## 2022-02-09 NOTE — TELEPHONE ENCOUNTER
Cheko Rocha M.D.   2/1/2022  3:21 PM PST         Please let the patient know that his 10-year ASCVD risk score is 11%.  Would recommend either undergoing coronary artery calcium score vs initiating Crestor 10 mg daily.  Thanks.     -----------------------------  Called pt [phone]  Pt in agreement to start Crestor 10mg daily per DA recommendation. Pr request only 60 day supply in order to watch for side effects. Pt agrees to contact office for further refills. Pt request Rx to be sent to [facility]. Rx placed per pt request.

## 2022-02-10 ENCOUNTER — HOSPITAL ENCOUNTER (OUTPATIENT)
Dept: RADIOLOGY | Facility: MEDICAL CENTER | Age: 72
End: 2022-02-10
Attending: INTERNAL MEDICINE
Payer: MEDICARE

## 2022-02-10 DIAGNOSIS — R55 SYNCOPE AND COLLAPSE: ICD-10-CM

## 2022-02-10 PROCEDURE — 93880 EXTRACRANIAL BILAT STUDY: CPT

## 2022-02-28 ENCOUNTER — HOSPITAL ENCOUNTER (OUTPATIENT)
Dept: CARDIOLOGY | Facility: MEDICAL CENTER | Age: 72
End: 2022-02-28
Attending: INTERNAL MEDICINE
Payer: MEDICARE

## 2022-02-28 DIAGNOSIS — R55 SYNCOPE AND COLLAPSE: ICD-10-CM

## 2022-02-28 PROCEDURE — 93306 TTE W/DOPPLER COMPLETE: CPT

## 2022-03-02 LAB
LV EJECT FRACT  99904: 65
LV EJECT FRACT MOD 2C 99903: 66.79
LV EJECT FRACT MOD 4C 99902: 60.37
LV EJECT FRACT MOD BP 99901: 64.66

## 2022-03-02 PROCEDURE — 93306 TTE W/DOPPLER COMPLETE: CPT | Mod: 26 | Performed by: INTERNAL MEDICINE

## 2022-03-03 ENCOUNTER — OFFICE VISIT (OUTPATIENT)
Dept: CARDIOLOGY | Facility: MEDICAL CENTER | Age: 72
End: 2022-03-03
Payer: MEDICARE

## 2022-03-03 VITALS
HEIGHT: 71 IN | WEIGHT: 204 LBS | RESPIRATION RATE: 14 BRPM | SYSTOLIC BLOOD PRESSURE: 110 MMHG | OXYGEN SATURATION: 95 % | BODY MASS INDEX: 28.56 KG/M2 | HEART RATE: 66 BPM | DIASTOLIC BLOOD PRESSURE: 80 MMHG

## 2022-03-03 DIAGNOSIS — R55 SYNCOPE, UNSPECIFIED SYNCOPE TYPE: ICD-10-CM

## 2022-03-03 DIAGNOSIS — E78.5 DYSLIPIDEMIA: ICD-10-CM

## 2022-03-03 DIAGNOSIS — I65.23 BILATERAL CAROTID ARTERY STENOSIS: ICD-10-CM

## 2022-03-03 PROCEDURE — 99214 OFFICE O/P EST MOD 30 MIN: CPT | Performed by: INTERNAL MEDICINE

## 2022-03-03 ASSESSMENT — FIBROSIS 4 INDEX: FIB4 SCORE: 1.44
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
## 2022-03-11 ENCOUNTER — OFFICE VISIT (OUTPATIENT)
Dept: MEDICAL GROUP | Facility: PHYSICIAN GROUP | Age: 72
End: 2022-03-11
Payer: MEDICARE

## 2022-03-11 VITALS
HEIGHT: 71 IN | BODY MASS INDEX: 28 KG/M2 | RESPIRATION RATE: 16 BRPM | WEIGHT: 200 LBS | HEART RATE: 85 BPM | SYSTOLIC BLOOD PRESSURE: 140 MMHG | OXYGEN SATURATION: 95 % | DIASTOLIC BLOOD PRESSURE: 70 MMHG | TEMPERATURE: 98.5 F

## 2022-03-11 DIAGNOSIS — R10.31 GROIN PAIN, RIGHT: ICD-10-CM

## 2022-03-11 DIAGNOSIS — M79.672 BILATERAL FOOT PAIN: ICD-10-CM

## 2022-03-11 DIAGNOSIS — M79.671 BILATERAL FOOT PAIN: ICD-10-CM

## 2022-03-11 DIAGNOSIS — R35.1 BENIGN PROSTATIC HYPERPLASIA WITH NOCTURIA: ICD-10-CM

## 2022-03-11 DIAGNOSIS — N40.1 BENIGN PROSTATIC HYPERPLASIA WITH NOCTURIA: ICD-10-CM

## 2022-03-11 PROCEDURE — 99214 OFFICE O/P EST MOD 30 MIN: CPT | Performed by: STUDENT IN AN ORGANIZED HEALTH CARE EDUCATION/TRAINING PROGRAM

## 2022-03-11 RX ORDER — TAMSULOSIN HYDROCHLORIDE 0.4 MG/1
0.4 CAPSULE ORAL
Qty: 30 CAPSULE | Refills: 2 | Status: SHIPPED | OUTPATIENT
Start: 2022-03-11 | End: 2022-05-05 | Stop reason: SDUPTHER

## 2022-03-11 ASSESSMENT — PATIENT HEALTH QUESTIONNAIRE - PHQ9: CLINICAL INTERPRETATION OF PHQ2 SCORE: 0

## 2022-03-11 ASSESSMENT — FIBROSIS 4 INDEX: FIB4 SCORE: 1.44

## 2022-03-11 NOTE — PROGRESS NOTES
CC:  Diagnoses of Benign prostatic hyperplasia with nocturia, Bilateral foot pain, and Groin pain, right were pertinent to this visit.    HISTORY OF THE PRESENT ILLNESS: Patient is a 71 y.o. male. This pleasant patient is here today to discuss 3 issues:    1.  Nocturia and post micturition dribbling.  Longstanding.  Patient usually has to get up twice a night for urination.  He does note some post micturition dribbling.  He is not currently taking any medications for this condition.    2.  Bilateral foot pain.  Patient has been having 3 to 4 weeks of bilateral foot pain.  This began when he switched to a new pairs of shoes.  He has already switch back to his old pair shoes but the pain persist.  He says that the pain is equal throughout the day, not getting worse with increased use.  Indicates that his symptoms are tingling and swelling.  He typically uses orthotic inserts in his foot and has not had these adjusted for many years.    3.  Right-sided groin pain.  This began at the beginning of this month that is right-sided and comes and goes.  It is a sensation of pressure in the inguinal region.  He does not report any change in size or shape of the right testicle or change in the fullness of the scrotal sac.  He reports no surface changes.      No problem-specific Assessment & Plan notes found for this encounter.      Current Outpatient Medications Ordered in Epic   Medication Sig Dispense Refill   • tamsulosin (FLOMAX) 0.4 MG capsule Take 1 Capsule by mouth 1/2 hour after breakfast. 30 Capsule 2   • sildenafil citrate (VIAGRA) 100 MG tablet Take 1 Tablet by mouth as needed. 90 Tablet 0   • ibuprofen (MOTRIN) 600 MG Tab Take 1 Tablet by mouth every 6 hours. Alternate w/ tylenol to take a medication every 3 hrs, take scheduled for 3 days post-op then PRN 45 Tablet 0   • Omega-3 Fatty Acids (FISH OIL) 1200 MG Cap Take 3 Capsules by mouth every day. 2 capsules daily     • Misc Natural Products (LUTEIN 20 PO) Take 20  "mg by mouth every day.       No current [facility]-ordered facility-administered medications on file.     ROS:   Gen: no fevers/chills, unplanned changes in weight  See HPI.    Objective:     Exam: /70 (BP Location: Left arm, Patient Position: Sitting, BP Cuff Size: Adult)   Pulse 85   Temp 36.9 °C (98.5 °F) (Temporal)   Resp 16   Ht 1.803 m (5' 11\")   Wt 90.7 kg (200 lb)   SpO2 95%  Body mass index is 27.89 kg/m².    General: Normal appearing. No distress.  HPulmonary: Clear to ausculation.  Normal effort. No rales, ronchi, or wheezing.  Cardiovascular: Regular rate and rhythm without murmur.   : No abnormalities of the testicles, spermatic cord or external inguinal rings could be noted.  No fullness of the inguinal canal.  No swelling/erythema/rash.  Neurologic: Grossly nonfocal  Skin: Warm and dry.  No obvious lesions.  Musculoskeletal: Normal gait. No extremity cyanosis, clubbing, or edema.  Bilateral feet: No abnormalities of bilateral feet could be detected.  There is no rash/heat/erythema/detectable swelling/deformity.  Good pedal pulses bilaterally.  Brisk capillary refill of the distal digits.  Monofilament test demonstrates normal sensation.  Deep palpation of the plantar fascia and its attachment points did not elicit any tenderness.    A chaperone was offered to the patient during today's exam. Patient declined chaperone.    Labs:   No pertinent labs.    Assessment & Plan:   71 y.o. male with the following -    1. Benign prostatic hyperplasia with nocturia  -Chronic with advancing symptoms.  -Tamsulosin 0.4 mg every morning.  Dispense 30.  Refill 2.    2. Bilateral foot pain  -Undiagnosed new problem with uncertain prognosis.  -This does not appear to be neuropathy or plantars fasciitis.  - Referral to Podiatry    3.  Right-sided groin pain.  -Undiagnosed new problem with uncertain prognosis.  -Ultrasound of the groin was offered but the patient declines at this point time.  -We will carefully " watch this and see if this resolves on its own or if it needs further attention.  Patient concurs with this plan.    Return if symptoms worsen or fail to improve.    Please note that this dictation was created using voice recognition software. I have made every reasonable attempt to correct obvious errors, but I expect that there are errors of grammar and possibly content that I did not discover before finalizing the note.    Otto An PA-C 3/11/2022

## 2022-04-04 ENCOUNTER — APPOINTMENT (RX ONLY)
Dept: URBAN - METROPOLITAN AREA CLINIC 35 | Facility: CLINIC | Age: 72
Setting detail: DERMATOLOGY
End: 2022-04-04

## 2022-04-04 DIAGNOSIS — L82.1 OTHER SEBORRHEIC KERATOSIS: ICD-10-CM

## 2022-04-04 PROCEDURE — ? COUNSELING

## 2022-04-04 PROCEDURE — 99212 OFFICE O/P EST SF 10 MIN: CPT

## 2022-04-04 PROCEDURE — ? LIQUID NITROGEN (COSMETIC)

## 2022-04-04 PROCEDURE — ? COSMETIC QUOTE

## 2022-04-04 ASSESSMENT — LOCATION SIMPLE DESCRIPTION DERM
LOCATION SIMPLE: RIGHT CHEEK
LOCATION SIMPLE: LEFT FOREHEAD
LOCATION SIMPLE: LEFT CHEEK
LOCATION SIMPLE: RIGHT EYEBROW
LOCATION SIMPLE: RIGHT FOREHEAD

## 2022-04-04 ASSESSMENT — LOCATION DETAILED DESCRIPTION DERM
LOCATION DETAILED: LEFT INFERIOR CENTRAL MALAR CHEEK
LOCATION DETAILED: RIGHT INFERIOR CENTRAL MALAR CHEEK
LOCATION DETAILED: RIGHT INFERIOR FOREHEAD
LOCATION DETAILED: RIGHT LATERAL EYEBROW
LOCATION DETAILED: LEFT INFERIOR LATERAL FOREHEAD

## 2022-04-04 ASSESSMENT — LOCATION ZONE DERM: LOCATION ZONE: FACE

## 2022-04-04 NOTE — PROCEDURE: COSMETIC QUOTE
[employer] Price Per Syringe: 500
Notice: We have created a more complete Cosmetic Quote plan.  The procedure name is also Cosmetic Quote.  Please review the new plan and hide the Cosmetic Quote plan you do not want to use.
[employer] Price Per Unit: 15
Discount Percentage: 0
Detail Level: Generalized

## 2022-04-04 NOTE — PROCEDURE: LIQUID NITROGEN (COSMETIC)
Detail Level: Detailed
Spray Paint Text: The liquid nitrogen was applied to the skin utilizing a spray paint frosting technique.
Render Post-Care Instructions In Note?: no
Show Spray Paint Technique Variable?: Yes
Consent: The patient's consent was obtained including but not limited to risks of crusting, scabbing, blistering, scarring, darker or lighter pigmentary change, recurrence, incomplete removal and infection. The patient understands that the procedure is cosmetic in nature and is not covered by insurance.
Price (Use Numbers Only, No Special Characters Or $): 0
Billing Information: Bill by Static Price
Post-Care Instructions: I reviewed with the patient in detail post-care instructions. Patient is to wear sunprotection, and avoid picking at any of the treated lesions. Pt may apply Vaseline to crusted or scabbing areas.

## 2022-05-05 RX ORDER — TAMSULOSIN HYDROCHLORIDE 0.4 MG/1
0.4 CAPSULE ORAL
Qty: 90 CAPSULE | Refills: 3 | Status: SHIPPED | OUTPATIENT
Start: 2022-05-05 | End: 2022-12-13 | Stop reason: SDUPTHER

## 2022-05-25 ENCOUNTER — PATIENT MESSAGE (OUTPATIENT)
Dept: CARDIOLOGY | Facility: MEDICAL CENTER | Age: 72
End: 2022-05-25
Payer: MEDICARE

## 2022-05-25 DIAGNOSIS — E78.5 DYSLIPIDEMIA: ICD-10-CM

## 2022-05-27 RX ORDER — ROSUVASTATIN CALCIUM 10 MG/1
10 TABLET, COATED ORAL EVERY EVENING
Qty: 90 TABLET | Refills: 0 | Status: SHIPPED | OUTPATIENT
Start: 2022-05-27 | End: 2022-07-24

## 2022-07-26 ENCOUNTER — APPOINTMENT (RX ONLY)
Dept: URBAN - METROPOLITAN AREA CLINIC 4 | Facility: CLINIC | Age: 72
Setting detail: DERMATOLOGY
End: 2022-07-26

## 2022-07-26 DIAGNOSIS — Z71.89 OTHER SPECIFIED COUNSELING: ICD-10-CM

## 2022-07-26 DIAGNOSIS — D49.2 NEOPLASM OF UNSPECIFIED BEHAVIOR OF BONE, SOFT TISSUE, AND SKIN: ICD-10-CM

## 2022-07-26 PROCEDURE — 11102 TANGNTL BX SKIN SINGLE LES: CPT

## 2022-07-26 PROCEDURE — ? COUNSELING

## 2022-07-26 PROCEDURE — ? BIOPSY BY SHAVE METHOD

## 2022-07-26 ASSESSMENT — LOCATION DETAILED DESCRIPTION DERM
LOCATION DETAILED: RIGHT RADIAL DORSAL HAND
LOCATION DETAILED: LEFT CENTRAL MALAR CHEEK
LOCATION DETAILED: LEFT ULNAR DORSAL HAND

## 2022-07-26 ASSESSMENT — LOCATION ZONE DERM
LOCATION ZONE: HAND
LOCATION ZONE: FACE

## 2022-07-26 ASSESSMENT — LOCATION SIMPLE DESCRIPTION DERM
LOCATION SIMPLE: RIGHT HAND
LOCATION SIMPLE: LEFT CHEEK
LOCATION SIMPLE: LEFT HAND

## 2022-07-26 NOTE — PROCEDURE: BIOPSY BY SHAVE METHOD

## 2022-07-28 PROBLEM — M75.41 SUBACROMIAL IMPINGEMENT OF RIGHT SHOULDER: Status: ACTIVE | Noted: 2022-07-28

## 2022-07-28 PROBLEM — S46.011A TRAUMATIC TEAR OF RIGHT ROTATOR CUFF, INITIAL ENCOUNTER: Status: ACTIVE | Noted: 2022-07-28

## 2022-07-28 PROBLEM — M75.21 BICEPS TENDONITIS ON RIGHT: Status: ACTIVE | Noted: 2022-07-28

## 2022-08-03 PROBLEM — M75.101 ROTATOR CUFF TEAR, RIGHT: Status: ACTIVE | Noted: 2022-08-03

## 2022-08-17 ENCOUNTER — APPOINTMENT (RX ONLY)
Dept: URBAN - METROPOLITAN AREA CLINIC 4 | Facility: CLINIC | Age: 72
Setting detail: DERMATOLOGY
End: 2022-08-17

## 2022-08-17 DIAGNOSIS — Z71.89 OTHER SPECIFIED COUNSELING: ICD-10-CM

## 2022-08-17 DIAGNOSIS — L57.0 ACTINIC KERATOSIS: ICD-10-CM

## 2022-08-17 DIAGNOSIS — L82.1 OTHER SEBORRHEIC KERATOSIS: ICD-10-CM

## 2022-08-17 DIAGNOSIS — L82.0 INFLAMED SEBORRHEIC KERATOSIS: ICD-10-CM

## 2022-08-17 DIAGNOSIS — L81.4 OTHER MELANIN HYPERPIGMENTATION: ICD-10-CM

## 2022-08-17 DIAGNOSIS — D18.0 HEMANGIOMA: ICD-10-CM

## 2022-08-17 PROBLEM — D18.01 HEMANGIOMA OF SKIN AND SUBCUTANEOUS TISSUE: Status: ACTIVE | Noted: 2022-08-17

## 2022-08-17 PROCEDURE — ? PRESCRIPTION

## 2022-08-17 PROCEDURE — ? MEDICATION COUNSELING

## 2022-08-17 PROCEDURE — 99213 OFFICE O/P EST LOW 20 MIN: CPT | Mod: 25

## 2022-08-17 PROCEDURE — ? COUNSELING

## 2022-08-17 PROCEDURE — ? LIQUID NITROGEN

## 2022-08-17 PROCEDURE — 17110 DESTRUCTION B9 LES UP TO 14: CPT

## 2022-08-17 RX ORDER — FLUOROURACIL 2 G/40G
CREAM TOPICAL BID
Qty: 40 | Refills: 0 | Status: ERX | COMMUNITY
Start: 2022-08-17

## 2022-08-17 RX ADMIN — FLUOROURACIL 1: 2 CREAM TOPICAL at 00:00

## 2022-08-17 ASSESSMENT — LOCATION ZONE DERM
LOCATION ZONE: ARM
LOCATION ZONE: FACE
LOCATION ZONE: TRUNK
LOCATION ZONE: LEG
LOCATION ZONE: SCALP

## 2022-08-17 ASSESSMENT — LOCATION SIMPLE DESCRIPTION DERM
LOCATION SIMPLE: RIGHT THIGH
LOCATION SIMPLE: RIGHT UPPER BACK
LOCATION SIMPLE: RIGHT ELBOW
LOCATION SIMPLE: LEFT THIGH
LOCATION SIMPLE: LEFT UPPER ARM
LOCATION SIMPLE: LEFT CHEEK
LOCATION SIMPLE: UPPER BACK
LOCATION SIMPLE: ABDOMEN
LOCATION SIMPLE: RIGHT CHEEK
LOCATION SIMPLE: CHEST
LOCATION SIMPLE: RIGHT UPPER ARM
LOCATION SIMPLE: SCALP

## 2022-08-17 ASSESSMENT — LOCATION DETAILED DESCRIPTION DERM
LOCATION DETAILED: PERIUMBILICAL SKIN
LOCATION DETAILED: LEFT CENTRAL MALAR CHEEK
LOCATION DETAILED: RIGHT ELBOW
LOCATION DETAILED: LEFT SUPERIOR CENTRAL MALAR CHEEK
LOCATION DETAILED: LEFT PROXIMAL POSTERIOR UPPER ARM
LOCATION DETAILED: RIGHT LATERAL MALAR CHEEK
LOCATION DETAILED: LEFT SUPERIOR PARIETAL SCALP
LOCATION DETAILED: SUPERIOR THORACIC SPINE
LOCATION DETAILED: RIGHT MEDIAL SUPERIOR CHEST
LOCATION DETAILED: LEFT DISTAL POSTERIOR UPPER ARM
LOCATION DETAILED: RIGHT PROXIMAL POSTERIOR UPPER ARM
LOCATION DETAILED: RIGHT DISTAL POSTERIOR UPPER ARM
LOCATION DETAILED: LEFT ANTERIOR DISTAL THIGH
LOCATION DETAILED: RIGHT MEDIAL UPPER BACK
LOCATION DETAILED: RIGHT ANTERIOR DISTAL THIGH

## 2022-08-17 NOTE — PROCEDURE: MEDICATION COUNSELING
Tremfya Counseling: I discussed with the patient the risks of guselkumab including but not limited to immunosuppression, serious infections, and drug reactions.  The patient understands that monitoring is required including a PPD at baseline and must alert us or the primary physician if symptoms of infection or other concerning signs are noted.
Solaraze Counseling:  I discussed with the patient the risks of Solaraze including but not limited to erythema, scaling, itching, weeping, crusting, and pain.
Thalidomide Counseling: I discussed with the patient the risks of thalidomide including but not limited to birth defects, anxiety, weakness, chest pain, dizziness, cough and severe allergy.
Cellcept Pregnancy And Lactation Text: This medication is Pregnancy Category D and isn't considered safe during pregnancy. It is unknown if this medication is excreted in breast milk.
5-Fu Counseling: 5-Fluorouracil Counseling:  I discussed with the patient the risks of 5-fluorouracil including but not limited to erythema, scaling, itching, weeping, crusting, and pain.
Erythromycin Counseling:  I discussed with the patient the risks of erythromycin including but not limited to GI upset, allergic reaction, drug rash, diarrhea, increase in liver enzymes, and yeast infections.
Itraconazole Counseling:  I discussed with the patient the risks of itraconazole including but not limited to liver damage, nausea/vomiting, neuropathy, and severe allergy.  The patient understands that this medication is best absorbed when taken with acidic beverages such as non-diet cola or ginger ale.  The patient understands that monitoring is required including baseline LFTs and repeat LFTs at intervals.  The patient understands that they are to contact us or the primary physician if concerning signs are noted.
Opioid Counseling: I discussed with the patient the potential side effects of opioids including but not limited to addiction, altered mental status, and depression. I stressed avoiding alcohol, benzodiazepines, muscle relaxants and sleep aids unless specifically okayed by a physician. The patient verbalized understanding of the proper use and possible adverse effects of opioids. All of the patient's questions and concerns were addressed. They were instructed to flush the remaining pills down the toilet if they did not need them for pain.
Niacinamide Pregnancy And Lactation Text: These medications are considered safe during pregnancy.
Azithromycin Pregnancy And Lactation Text: This medication is considered safe during pregnancy and is also secreted in breast milk.
Gabapentin Counseling: I discussed with the patient the risks of gabapentin including but not limited to dizziness, somnolence, fatigue and ataxia.
Simponi Counseling:  I discussed with the patient the risks of golimumab including but not limited to myelosuppression, immunosuppression, autoimmune hepatitis, demyelinating diseases, lymphoma, and serious infections.  The patient understands that monitoring is required including a PPD at baseline and must alert us or the primary physician if symptoms of infection or other concerning signs are noted.
Doxepin Pregnancy And Lactation Text: This medication is Pregnancy Category C and it isn't known if it is safe during pregnancy. It is also excreted in breast milk and breast feeding isn't recommended.
Infliximab Pregnancy And Lactation Text: This medication is Pregnancy Category B and is considered safe during pregnancy. It is unknown if this medication is excreted in breast milk.
Azelaic Acid Pregnancy And Lactation Text: This medication is considered safe during pregnancy and breast feeding.
Opzelura Pregnancy And Lactation Text: There is insufficient data to evaluate drug-associated risk for major birth defects, miscarriage, or other adverse maternal or fetal outcomes.  There is a pregnancy registry that monitors pregnancy outcomes in pregnant persons exposed to the medication during pregnancy.  It is unknown if this medication is excreted in breast milk.  Do not breastfeed during treatment and for about 4 weeks after the last dose.
Colchicine Pregnancy And Lactation Text: This medication is Pregnancy Category C and isn't considered safe during pregnancy. It is excreted in breast milk.
Erythromycin Pregnancy And Lactation Text: This medication is Pregnancy Category B and is considered safe during pregnancy. It is also excreted in breast milk.
Solaraze Pregnancy And Lactation Text: This medication is Pregnancy Category B and is considered safe. There is some data to suggest avoiding during the third trimester. It is unknown if this medication is excreted in breast milk.
Acitretin Counseling:  I discussed with the patient the risks of acitretin including but not limited to hair loss, dry lips/skin/eyes, liver damage, hyperlipidemia, depression/suicidal ideation, photosensitivity.  Serious rare side effects can include but are not limited to pancreatitis, pseudotumor cerebri, bony changes, clot formation/stroke/heart attack.  Patient understands that alcohol is contraindicated since it can result in liver toxicity and significantly prolong the elimination of the drug by many years.
Opioid Pregnancy And Lactation Text: These medications can lead to premature delivery and should be avoided during pregnancy. These medications are also present in breast milk in small amounts.
Topical Sulfur Applications Counseling: Topical Sulfur Counseling: Patient counseled that this medication may cause skin irritation or allergic reactions.  In the event of skin irritation, the patient was advised to reduce the amount of the drug applied or use it less frequently.   The patient verbalized understanding of the proper use and possible adverse effects of topical sulfur application.  All of the patient's questions and concerns were addressed.
Nsaids Counseling: NSAID Counseling: I discussed with the patient that NSAIDs should be taken with food. Prolonged use of NSAIDs can result in the development of stomach ulcers.  Patient advised to stop taking NSAIDs if abdominal pain occurs.  The patient verbalized understanding of the proper use and possible adverse effects of NSAIDs.  All of the patient's questions and concerns were addressed.
Imiquimod Counseling:  I discussed with the patient the risks of imiquimod including but not limited to erythema, scaling, itching, weeping, crusting, and pain.  Patient understands that the inflammatory response to imiquimod is variable from person to person and was educated regarded proper titration schedule.  If flu-like symptoms develop, patient knows to discontinue the medication and contact us.
Itraconazole Pregnancy And Lactation Text: This medication is Pregnancy Category C and it isn't know if it is safe during pregnancy. It is also excreted in breast milk.
Oxybutynin Pregnancy And Lactation Text: This medication is Pregnancy Category B and is considered safe during pregnancy. It is unknown if it is excreted in breast milk.
Sarecycline Counseling: Patient advised regarding possible photosensitivity and discoloration of the teeth, skin, lips, tongue and gums.  Patient instructed to avoid sunlight, if possible.  When exposed to sunlight, patients should wear protective clothing, sunglasses, and sunscreen.  The patient was instructed to call the office immediately if the following severe adverse effects occur:  hearing changes, easy bruising/bleeding, severe headache, or vision changes.  The patient verbalized understanding of the proper use and possible adverse effects of sarecycline.  All of the patient's questions and concerns were addressed.
Hydroxyzine Counseling: Patient advised that the medication is sedating and not to drive a car after taking this medication.  Patient informed of potential adverse effects including but not limited to dry mouth, urinary retention, and blurry vision.  The patient verbalized understanding of the proper use and possible adverse effects of hydroxyzine.  All of the patient's questions and concerns were addressed.
5-Fu Pregnancy And Lactation Text: This medication is Pregnancy Category X and contraindicated in pregnancy and in women who may become pregnant. It is unknown if this medication is excreted in breast milk.
Acitretin Pregnancy And Lactation Text: This medication is Pregnancy Category X and should not be given to women who are pregnant or may become pregnant in the future. This medication is excreted in breast milk.
Dupixent Counseling: I discussed with the patient the risks of dupilumab including but not limited to eye infection and irritation, cold sores, injection site reactions, worsening of asthma, allergic reactions and increased risk of parasitic infection.  Live vaccines should be avoided while taking dupilumab. Dupilumab will also interact with certain medications such as warfarin and cyclosporine. The patient understands that monitoring is required and they must alert us or the primary physician if symptoms of infection or other concerning signs are noted.
Olumiant Counseling: I discussed with the patient the risks of Olumiant therapy including but not limited to upper respiratory tract infections, shingles, cold sores, and nausea. Live vaccines should be avoided.  This medication has been linked to serious infections; higher rate of mortality; malignancy and lymphoproliferative disorders; major adverse cardiovascular events; thrombosis; gastrointestinal perforations; neutropenia; lymphopenia; anemia; liver enzyme elevations; and lipid elevations.
Tremfya Pregnancy And Lactation Text: The risk during pregnancy and breastfeeding is uncertain with this medication.
Picato Counseling:  I discussed with the patient the risks of Picato including but not limited to erythema, scaling, itching, weeping, crusting, and pain.
Thalidomide Pregnancy And Lactation Text: This medication is Pregnancy Category X and is absolutely contraindicated during pregnancy. It is unknown if it is excreted in breast milk.
Cyclophosphamide Counseling:  I discussed with the patient the risks of cyclophosphamide including but not limited to hair loss, hormonal abnormalities, decreased fertility, abdominal pain, diarrhea, nausea and vomiting, bone marrow suppression and infection. The patient understands that monitoring is required while taking this medication.
Benzoyl Peroxide Counseling: Patient counseled that medicine may cause skin irritation and bleach clothing.  In the event of skin irritation, the patient was advised to reduce the amount of the drug applied or use it less frequently.   The patient verbalized understanding of the proper use and possible adverse effects of benzoyl peroxide.  All of the patient's questions and concerns were addressed.
Bactrim Counseling:  I discussed with the patient the risks of sulfa antibiotics including but not limited to GI upset, allergic reaction, drug rash, diarrhea, dizziness, photosensitivity, and yeast infections.  Rarely, more serious reactions can occur including but not limited to aplastic anemia, agranulocytosis, methemoglobinemia, blood dyscrasias, liver or kidney failure, lung infiltrates or desquamative/blistering drug rashes.
Topical Sulfur Applications Pregnancy And Lactation Text: This medication is considered safe during pregnancy and breast feeding secondary to limited systemic absorption.
Nsaids Pregnancy And Lactation Text: These medications are considered safe up to 30 weeks gestation. It is excreted in breast milk.
Ketoconazole Counseling:   Patient counseled regarding improving absorption with orange juice.  Adverse effects include but are not limited to breast enlargement, headache, diarrhea, nausea, upset stomach, liver function test abnormalities, taste disturbance, and stomach pain.  There is a rare possibility of liver failure that can occur when taking ketoconazole. The patient understands that monitoring of LFTs may be required, especially at baseline. The patient verbalized understanding of the proper use and possible adverse effects of ketoconazole.  All of the patient's questions and concerns were addressed.
Imiquimod Pregnancy And Lactation Text: This medication is Pregnancy Category C. It is unknown if this medication is excreted in breast milk.
Sarecycline Pregnancy And Lactation Text: This medication is Pregnancy Category D and not consider safe during pregnancy. It is also excreted in breast milk.
Xeljanz Counseling: I discussed with the patient the risks of Xeljanz therapy including increased risk of infection, liver issues, headache, diarrhea, or cold symptoms. Live vaccines should be avoided. They were instructed to call if they have any problems.
Dapsone Counseling: I discussed with the patient the risks of dapsone including but not limited to hemolytic anemia, agranulocytosis, rashes, methemoglobinemia, kidney failure, peripheral neuropathy, headaches, GI upset, and liver toxicity.  Patients who start dapsone require monitoring including baseline LFTs and weekly CBCs for the first month, then every month thereafter.  The patient verbalized understanding of the proper use and possible adverse effects of dapsone.  All of the patient's questions and concerns were addressed.
Metronidazole Counseling:  I discussed with the patient the risks of metronidazole including but not limited to seizures, nausea/vomiting, a metallic taste in the mouth, nausea/vomiting and severe allergy.
Adbry Counseling: I discussed with the patient the risks of tralokinumab including but not limited to eye infection and irritation, cold sores, injection site reactions, worsening of asthma, allergic reactions and increased risk of parasitic infection.  Live vaccines should be avoided while taking tralokinumab. The patient understands that monitoring is required and they must alert us or the primary physician if symptoms of infection or other concerning signs are noted.
Propranolol Counseling:  I discussed with the patient the risks of propranolol including but not limited to low heart rate, low blood pressure, low blood sugar, restlessness and increased cold sensitivity. They should call the office if they experience any of these side effects.
Hydroxyzine Pregnancy And Lactation Text: This medication is not safe during pregnancy and should not be taken. It is also excreted in breast milk and breast feeding isn't recommended.
Drysol Counseling:  I discussed with the patient the risks of drysol/aluminum chloride including but not limited to skin rash, itching, irritation, burning.
Dupixent Pregnancy And Lactation Text: This medication likely crosses the placenta but the risk for the fetus is uncertain. This medication is excreted in breast milk.
Bexarotene Counseling:  I discussed with the patient the risks of bexarotene including but not limited to hair loss, dry lips/skin/eyes, liver abnormalities, hyperlipidemia, pancreatitis, depression/suicidal ideation, photosensitivity, drug rash/allergic reactions, hypothyroidism, anemia, leukopenia, infection, cataracts, and teratogenicity.  Patient understands that they will need regular blood tests to check lipid profile, liver function tests, white blood cell count, thyroid function tests and pregnancy test if applicable.
Propranolol Pregnancy And Lactation Text: This medication is Pregnancy Category C and it isn't known if it is safe during pregnancy. It is excreted in breast milk.
Benzoyl Peroxide Pregnancy And Lactation Text: This medication is Pregnancy Category C. It is unknown if benzoyl peroxide is excreted in breast milk.
Bactrim Pregnancy And Lactation Text: This medication is Pregnancy Category D and is known to cause fetal risk.  It is also excreted in breast milk.
Topical Retinoid counseling:  Patient advised to apply a pea-sized amount only at bedtime and wait 30 minutes after washing their face before applying.  If too drying, patient may add a non-comedogenic moisturizer. The patient verbalized understanding of the proper use and possible adverse effects of retinoids.  All of the patient's questions and concerns were addressed.
Glycopyrrolate Counseling:  I discussed with the patient the risks of glycopyrrolate including but not limited to skin rash, drowsiness, dry mouth, difficulty urinating, and blurred vision.
Tranexamic Acid Counseling:  Patient advised of the small risk of bleeding problems with tranexamic acid. They were also instructed to call if they developed any nausea, vomiting or diarrhea. All of the patient's questions and concerns were addressed.
Tetracycline Counseling: Patient counseled regarding possible photosensitivity and increased risk for sunburn.  Patient instructed to avoid sunlight, if possible.  When exposed to sunlight, patients should wear protective clothing, sunglasses, and sunscreen.  The patient was instructed to call the office immediately if the following severe adverse effects occur:  hearing changes, easy bruising/bleeding, severe headache, or vision changes.  The patient verbalized understanding of the proper use and possible adverse effects of tetracycline.  All of the patient's questions and concerns were addressed. Patient understands to avoid pregnancy while on therapy due to potential birth defects.
Cyclophosphamide Pregnancy And Lactation Text: This medication is Pregnancy Category D and it isn't considered safe during pregnancy. This medication is excreted in breast milk.
Wartpeel Counseling:  I discussed with the patient the risks of Wartpeel including but not limited to erythema, scaling, itching, weeping, crusting, and pain.
Dapsone Pregnancy And Lactation Text: This medication is Pregnancy Category C and is not considered safe during pregnancy or breast feeding.
Arava Counseling:  Patient counseled regarding adverse effects of Arava including but not limited to nausea, vomiting, abnormalities in liver function tests. Patients may develop mouth sores, rash, diarrhea, and abnormalities in blood counts. The patient understands that monitoring is required including LFTs and blood counts.  There is a rare possibility of scarring of the liver and lung problems that can occur when taking methotrexate. Persistent nausea, loss of appetite, pale stools, dark urine, cough, and shortness of breath should be reported immediately. Patient advised to discontinue Arava treatment and consult with a physician prior to attempting conception. The patient will have to undergo a treatment to eliminate Arava from the body prior to conception.
Olumiant Pregnancy And Lactation Text: Based on animal studies, Olumiant may cause embryo-fetal harm when administered to pregnant women.  The medication should not be used in pregnancy.  Breastfeeding is not recommended during treatment.
Ketoconazole Pregnancy And Lactation Text: This medication is Pregnancy Category C and it isn't know if it is safe during pregnancy. It is also excreted in breast milk and breast feeding isn't recommended.
Xellouz Pregnancy And Lactation Text: This medication is Pregnancy Category D and is not considered safe during pregnancy.  The risk during breast feeding is also uncertain.
Odomzo Counseling- I discussed with the patient the risks of Odomzo including but not limited to nausea, vomiting, diarrhea, constipation, weight loss, changes in the sense of taste, decreased appetite, muscle spasms, and hair loss.  The patient verbalized understanding of the proper use and possible adverse effects of Odomzo.  All of the patient's questions and concerns were addressed.
Enbrel Counseling:  I discussed with the patient the risks of etanercept including but not limited to myelosuppression, immunosuppression, autoimmune hepatitis, demyelinating diseases, lymphoma, and infections.  The patient understands that monitoring is required including a PPD at baseline and must alert us or the primary physician if symptoms of infection or other concerning signs are noted.
Tranexamic Acid Pregnancy And Lactation Text: It is unknown if this medication is safe during pregnancy or breast feeding.
Klisyri Counseling:  I discussed with the patient the risks of Klisyri including but not limited to erythema, scaling, itching, weeping, crusting, and pain.
Cephalexin Counseling: I counseled the patient regarding use of cephalexin as an antibiotic for prophylactic and/or therapeutic purposes. Cephalexin (commonly prescribed under brand name Keflex) is a cephalosporin antibiotic which is active against numerous classes of bacteria, including most skin bacteria. Side effects may include nausea, diarrhea, gastrointestinal upset, rash, hives, yeast infections, and in rare cases, hepatitis, kidney disease, seizures, fever, confusion, neurologic symptoms, and others. Patients with severe allergies to penicillin medications are cautioned that there is about a 10% incidence of cross-reactivity with cephalosporins. When possible, patients with penicillin allergies should use alternatives to cephalosporins for antibiotic therapy.
Protopic Counseling: Patient may experience a mild burning sensation during topical application. Protopic is not approved in children less than 2 years of age. There have been case reports of hematologic and skin malignancies in patients using topical calcineurin inhibitors although causality is questionable.
Glycopyrrolate Pregnancy And Lactation Text: This medication is Pregnancy Category B and is considered safe during pregnancy. It is unknown if it is excreted breast milk.
Adbry Pregnancy And Lactation Text: It is unknown if this medication will adversely affect pregnancy or breast feeding.
Metronidazole Pregnancy And Lactation Text: This medication is Pregnancy Category B and considered safe during pregnancy.  It is also excreted in breast milk.
Dutasteride Male Counseling: Dustasteride Counseling:  I discussed with the patient the risks of use of dutasteride including but not limited to decreased libido, decreased ejaculate volume, and gynecomastia. Women who can become pregnant should not handle medication.  All of the patient's questions and concerns were addressed.
Albendazole Counseling:  I discussed with the patient the risks of albendazole including but not limited to cytopenia, kidney damage, nausea/vomiting and severe allergy.  The patient understands that this medication is being used in an off-label manner.
Rinvoq Counseling: I discussed with the patient the risks of Rinvoq therapy including but not limited to upper respiratory tract infections, shingles, cold sores, bronchitis, nausea, cough, fever, acne, and headache. Live vaccines should be avoided.  This medication has been linked to serious infections; higher rate of mortality; malignancy and lymphoproliferative disorders; major adverse cardiovascular events; thrombosis; thrombocytopenia, anemia, and neutropenia; lipid elevations; liver enzyme elevations; and gastrointestinal perforations.
Skyrizi Counseling: I discussed with the patient the risks of risankizumab-rzaa including but not limited to immunosuppression, and serious infections.  The patient understands that monitoring is required including a PPD at baseline and must alert us or the primary physician if symptoms of infection or other concerning signs are noted.
Carac Counseling:  I discussed with the patient the risks of Carac including but not limited to erythema, scaling, itching, weeping, crusting, and pain.
Cyclosporine Counseling:  I discussed with the patient the risks of cyclosporine including but not limited to hypertension, gingival hyperplasia,myelosuppression, immunosuppression, liver damage, kidney damage, neurotoxicity, lymphoma, and serious infections. The patient understands that monitoring is required including baseline blood pressure, CBC, CMP, lipid panel and uric acid, and then 1-2 times monthly CMP and blood pressure.
Bexarotene Pregnancy And Lactation Text: This medication is Pregnancy Category X and should not be given to women who are pregnant or may become pregnant. This medication should not be used if you are breast feeding.
Klisyri Pregnancy And Lactation Text: It is unknown if this medication can harm a developing fetus or if it is excreted in breast milk.
Birth Control Pills Counseling: Birth Control Pill Counseling: I discussed with the patient the potential side effects of OCPs including but not limited to increased risk of stroke, heart attack, thrombophlebitis, deep venous thrombosis, hepatic adenomas, breast changes, GI upset, headaches, and depression.  The patient verbalized understanding of the proper use and possible adverse effects of OCPs. All of the patient's questions and concerns were addressed.
Elidel Counseling: Patient may experience a mild burning sensation during topical application. Elidel is not approved in children less than 2 years of age. There have been case reports of hematologic and skin malignancies in patients using topical calcineurin inhibitors although causality is questionable.
Hydroxychloroquine Counseling:  I discussed with the patient that a baseline ophthalmologic exam is needed at the start of therapy and every year thereafter while on therapy. A CBC may also be warranted for monitoring.  The side effects of this medication were discussed with the patient, including but not limited to agranulocytosis, aplastic anemia, seizures, rashes, retinopathy, and liver toxicity. Patient instructed to call the office should any adverse effect occur.  The patient verbalized understanding of the proper use and possible adverse effects of Plaquenil.  All the patient's questions and concerns were addressed.
Valtrex Counseling: I discussed with the patient the risks of valacyclovir including but not limited to kidney damage, nausea, vomiting and severe allergy.  The patient understands that if the infection seems to be worsening or is not improving, they are to call.
Cyclosporine Pregnancy And Lactation Text: This medication is Pregnancy Category C and it isn't know if it is safe during pregnancy. This medication is excreted in breast milk.
Tazorac Counseling:  Patient advised that medication is irritating and drying.  Patient may need to apply sparingly and wash off after an hour before eventually leaving it on overnight.  The patient verbalized understanding of the proper use and possible adverse effects of tazorac.  All of the patient's questions and concerns were addressed.
Isotretinoin Counseling: Patient should get monthly blood tests, not donate blood, not drive at night if vision affected, not share medication, and not undergo elective surgery for 6 months after tx completed. Side effects reviewed, pt to contact office should one occur.
Minocycline Counseling: Patient advised regarding possible photosensitivity and discoloration of the teeth, skin, lips, tongue and gums.  Patient instructed to avoid sunlight, if possible.  When exposed to sunlight, patients should wear protective clothing, sunglasses, and sunscreen.  The patient was instructed to call the office immediately if the following severe adverse effects occur:  hearing changes, easy bruising/bleeding, severe headache, or vision changes.  The patient verbalized understanding of the proper use and possible adverse effects of minocycline.  All of the patient's questions and concerns were addressed.
Terbinafine Counseling: Patient counseling regarding adverse effects of terbinafine including but not limited to headache, diarrhea, rash, upset stomach, liver function test abnormalities, itching, taste/smell disturbance, nausea, abdominal pain, and flatulence.  There is a rare possibility of liver failure that can occur when taking terbinafine.  The patient understands that a baseline LFT and kidney function test may be required. The patient verbalized understanding of the proper use and possible adverse effects of terbinafine.  All of the patient's questions and concerns were addressed.
Clofazimine Counseling:  I discussed with the patient the risks of clofazimine including but not limited to skin and eye pigmentation, liver damage, nausea/vomiting, gastrointestinal bleeding and allergy.
Xolair Counseling:  Patient informed of potential adverse effects including but not limited to fever, muscle aches, rash and allergic reactions.  The patient verbalized understanding of the proper use and possible adverse effects of Xolair.  All of the patient's questions and concerns were addressed.
Cibinqo Counseling: I discussed with the patient the risks of Cibinqo therapy including but not limited to common cold, nausea, headache, cold sores, increased blood CPK levels, dizziness, UTIs, fatigue, acne, and vomitting. Live vaccines should be avoided.  This medication has been linked to serious infections; higher rate of mortality; malignancy and lymphoproliferative disorders; major adverse cardiovascular events; thrombosis; thrombocytopenia and lymphopenia; lipid elevations; and retinal detachment.
Protopic Pregnancy And Lactation Text: This medication is Pregnancy Category C. It is unknown if this medication is excreted in breast milk when applied topically.
Cephalexin Pregnancy And Lactation Text: This medication is Pregnancy Category B and considered safe during pregnancy.  It is also excreted in breast milk but can be used safely for shorter doses.
Detail Level: Simple
Rinvoq Pregnancy And Lactation Text: Based on animal studies, Rinvoq may cause embryo-fetal harm when administered to pregnant women.  The medication should not be used in pregnancy.  Breastfeeding is not recommended during treatment and for 6 days after the last dose.
Minoxidil Counseling: Minoxidil is a topical medication which can increase blood flow where it is applied. It is uncertain how this medication increases hair growth. Side effects are uncommon and include stinging and allergic reactions.
Winlevi Counseling:  I discussed with the patient the risks of topical clascoterone including but not limited to erythema, scaling, itching, and stinging. Patient voiced their understanding.
Xolair Pregnancy And Lactation Text: This medication is Pregnancy Category B and is considered safe during pregnancy. This medication is excreted in breast milk.
Dutasteride Pregnancy And Lactation Text: This medication is absolutely contraindicated in women, especially during pregnancy and breast feeding. Feminization of male fetuses is possible if taking while pregnant.
Albendazole Pregnancy And Lactation Text: This medication is Pregnancy Category C and it isn't known if it is safe during pregnancy. It is also excreted in breast milk.
Birth Control Pills Pregnancy And Lactation Text: This medication should be avoided if pregnant and for the first 30 days post-partum.
Tazorac Pregnancy And Lactation Text: This medication is not safe during pregnancy. It is unknown if this medication is excreted in breast milk.
Oral Minoxidil Counseling- I discussed with the patient the risks of oral minoxidil including but not limited to shortness of breath, swelling of the feet or ankles, dizziness, lightheadedness, unwanted hair growth and allergic reaction.  The patient verbalized understanding of the proper use and possible adverse effects of oral minoxidil.  All of the patient's questions and concerns were addressed.
Terbinafine Pregnancy And Lactation Text: This medication is Pregnancy Category B and is considered safe during pregnancy. It is also excreted in breast milk and breast feeding isn't recommended.
Humira Counseling:  I discussed with the patient the risks of adalimumab including but not limited to myelosuppression, immunosuppression, autoimmune hepatitis, demyelinating diseases, lymphoma, and serious infections.  The patient understands that monitoring is required including a PPD at baseline and must alert us or the primary physician if symptoms of infection or other concerning signs are noted.
Fluconazole Counseling:  Patient counseled regarding adverse effects of fluconazole including but not limited to headache, diarrhea, nausea, upset stomach, liver function test abnormalities, taste disturbance, and stomach pain.  There is a rare possibility of liver failure that can occur when taking fluconazole.  The patient understands that monitoring of LFTs and kidney function test may be required, especially at baseline. The patient verbalized understanding of the proper use and possible adverse effects of fluconazole.  All of the patient's questions and concerns were addressed.
Calcipotriene Counseling: Cantharidin Counseling:  I discussed with the patient the risks of Cantharidin including but not limited to pain, redness, burning, itching, and blistering.
Stelara Counseling:  I discussed with the patient the risks of ustekinumab including but not limited to immunosuppression, malignancy, posterior leukoencephalopathy syndrome, and serious infections.  The patient understands that monitoring is required including a PPD at baseline and must alert us or the primary physician if symptoms of infection or other concerning signs are noted.
Cibinqo Pregnancy And Lactation Text: It is unknown if this medication will adversely affect pregnancy or breast feeding.  You should not take this medication if you are currently pregnant or planning a pregnancy or while breastfeeding.
Spironolactone Counseling: Patient advised regarding risks of diarrhea, abdominal pain, hyperkalemia, birth defects (for female patients), liver toxicity and renal toxicity. The patient may need blood work to monitor liver and kidney function and potassium levels while on therapy. The patient verbalized understanding of the proper use and possible adverse effects of spironolactone.  All of the patient's questions and concerns were addressed.
Isotretinoin Pregnancy And Lactation Text: This medication is Pregnancy Category X and is considered extremely dangerous during pregnancy. It is unknown if it is excreted in breast milk.
Hydroxychloroquine Pregnancy And Lactation Text: This medication has been shown to cause fetal harm but it isn't assigned a Pregnancy Risk Category. There are small amounts excreted in breast milk.
Rituxan Counseling:  I discussed with the patient the risks of Rituxan infusions. Side effects can include infusion reactions, severe drug rashes including mucocutaneous reactions, reactivation of latent hepatitis and other infections and rarely progressive multifocal leukoencephalopathy.  All of the patient's questions and concerns were addressed.
Include Pregnancy/Lactation Warning?: No
Minoxidil Pregnancy And Lactation Text: This medication has not been assigned a Pregnancy Risk Category but animal studies failed to show danger with the topical medication. It is unknown if the medication is excreted in breast milk.
Qbrexza Counseling:  I discussed with the patient the risks of Qbrexza including but not limited to headache, mydriasis, blurred vision, dry eyes, nasal dryness, dry mouth, dry throat, dry skin, urinary hesitation, and constipation.  Local skin reactions including erythema, burning, stinging, and itching can also occur.
Erivedge Counseling- I discussed with the patient the risks of Erivedge including but not limited to nausea, vomiting, diarrhea, constipation, weight loss, changes in the sense of taste, decreased appetite, muscle spasms, and hair loss.  The patient verbalized understanding of the proper use and possible adverse effects of Erivedge.  All of the patient's questions and concerns were addressed.
Valtrex Pregnancy And Lactation Text: this medication is Pregnancy Category B and is considered safe during pregnancy. This medication is not directly found in breast milk but it's metabolite acyclovir is present.
Methotrexate Counseling:  Patient counseled regarding adverse effects of methotrexate including but not limited to nausea, vomiting, abnormalities in liver function tests. Patients may develop mouth sores, rash, diarrhea, and abnormalities in blood counts. The patient understands that monitoring is required including LFT's and blood counts.  There is a rare possibility of scarring of the liver and lung problems that can occur when taking methotrexate. Persistent nausea, loss of appetite, pale stools, dark urine, cough, and shortness of breath should be reported immediately. Patient advised to discontinue methotrexate treatment at least three months before attempting to become pregnant.  I discussed the need for folate supplements while taking methotrexate.  These supplements can decrease side effects during methotrexate treatment. The patient verbalized understanding of the proper use and possible adverse effects of methotrexate.  All of the patient's questions and concerns were addressed.
Clindamycin Counseling: I counseled the patient regarding use of clindamycin as an antibiotic for prophylactic and/or therapeutic purposes. Clindamycin is active against numerous classes of bacteria, including skin bacteria. Side effects may include nausea, diarrhea, gastrointestinal upset, rash, hives, yeast infections, and in rare cases, colitis.
Oral Minoxidil Pregnancy And Lactation Text: This medication should only be used when clearly needed if you are pregnant, attempting to become pregnant or breast feeding.
Winlevi Pregnancy And Lactation Text: This medication is considered safe during pregnancy and breastfeeding.
Ivermectin Counseling:  Patient instructed to take medication on an empty stomach with a full glass of water.  Patient informed of potential adverse effects including but not limited to nausea, diarrhea, dizziness, itching, and swelling of the extremities or lymph nodes.  The patient verbalized understanding of the proper use and possible adverse effects of ivermectin.  All of the patient's questions and concerns were addressed.
Topical Clindamycin Counseling: Patient counseled that this medication may cause skin irritation or allergic reactions.  In the event of skin irritation, the patient was advised to reduce the amount of the drug applied or use it less frequently.   The patient verbalized understanding of the proper use and possible adverse effects of clindamycin.  All of the patient's questions and concerns were addressed.
Eucrisa Counseling: Patient may experience a mild burning sensation during topical application. Eucrisa is not approved in children less than 3 months of age.
Quinolones Counseling:  I discussed with the patient the risks of fluoroquinolones including but not limited to GI upset, allergic reaction, drug rash, diarrhea, dizziness, photosensitivity, yeast infections, liver function test abnormalities, tendonitis/tendon rupture.
High Dose Vitamin A Counseling: Side effects reviewed, pt to contact office should one occur.
Clindamycin Pregnancy And Lactation Text: This medication can be used in pregnancy if certain situations. Clindamycin is also present in breast milk.
Spironolactone Pregnancy And Lactation Text: This medication can cause feminization of the male fetus and should be avoided during pregnancy. The active metabolite is also found in breast milk.
Qbrexza Pregnancy And Lactation Text: There is no available data on Qbrexza use in pregnant women.  There is no available data on Qbrexza use in lactation.
Azathioprine Counseling:  I discussed with the patient the risks of azathioprine including but not limited to myelosuppression, immunosuppression, hepatotoxicity, lymphoma, and infections.  The patient understands that monitoring is required including baseline LFTs, Creatinine, possible TPMP genotyping and weekly CBCs for the first month and then every 2 weeks thereafter.  The patient verbalized understanding of the proper use and possible adverse effects of azathioprine.  All of the patient's questions and concerns were addressed.
Cimetidine Counseling:  I discussed with the patient the risks of Cimetidine including but not limited to gynecomastia, headache, diarrhea, nausea, drowsiness, arrhythmias, pancreatitis, skin rashes, psychosis, bone marrow suppression and kidney toxicity.
Calcipotriene Pregnancy And Lactation Text: This medication has not been proven safe during pregnancy. It is unknown if this medication is excreted in breast milk.
Libtayo Counseling- I discussed with the patient the risks of Libtayo including but not limited to nausea, vomiting, diarrhea, and bone or muscle pain.  The patient verbalized understanding of the proper use and possible adverse effects of Libtayo.  All of the patient's questions and concerns were addressed.
Cimzia Counseling:  I discussed with the patient the risks of Cimzia including but not limited to immunosuppression, allergic reactions and infections.  The patient understands that monitoring is required including a PPD at baseline and must alert us or the primary physician if symptoms of infection or other concerning signs are noted.
Methotrexate Pregnancy And Lactation Text: This medication is Pregnancy Category X and is known to cause fetal harm. This medication is excreted in breast milk.
Zyclara Counseling:  I discussed with the patient the risks of imiquimod including but not limited to erythema, scaling, itching, weeping, crusting, and pain.  Patient understands that the inflammatory response to imiquimod is variable from person to person and was educated regarded proper titration schedule.  If flu-like symptoms develop, patient knows to discontinue the medication and contact us.
Ilumya Counseling: I discussed with the patient the risks of tildrakizumab including but not limited to immunosuppression, malignancy, posterior leukoencephalopathy syndrome, and serious infections.  The patient understands that monitoring is required including a PPD at baseline and must alert us or the primary physician if symptoms of infection or other concerning signs are noted.
Mirvaso Counseling: Mirvaso is a topical medication which can decrease superficial blood flow where applied. Side effects are uncommon and include stinging, redness and allergic reactions.
Rituxan Pregnancy And Lactation Text: This medication is Pregnancy Category C and it isn't know if it is safe during pregnancy. It is unknown if this medication is excreted in breast milk but similar antibodies are known to be excreted.
Aklief counseling:  Patient advised to apply a pea-sized amount only at bedtime and wait 30 minutes after washing their face before applying.  If too drying, patient may add a non-comedogenic moisturizer.  The most commonly reported side effects including irritation, redness, scaling, dryness, stinging, burning, itching, and increased risk of sunburn.  The patient verbalized understanding of the proper use and possible adverse effects of retinoids.  All of the patient's questions and concerns were addressed.
Prednisone Counseling:  I discussed with the patient the risks of prolonged use of prednisone including but not limited to weight gain, insomnia, osteoporosis, mood changes, diabetes, susceptibility to infection, glaucoma and high blood pressure.  In cases where prednisone use is prolonged, patients should be monitored with blood pressure checks, serum glucose levels and an eye exam.  Additionally, the patient may need to be placed on GI prophylaxis, PCP prophylaxis, and calcium and vitamin D supplementation and/or a bisphosphonate.  The patient verbalized understanding of the proper use and the possible adverse effects of prednisone.  All of the patient's questions and concerns were addressed.
Taltz Counseling: I discussed with the patient the risks of ixekizumab including but not limited to immunosuppression, serious infections, worsening of inflammatory bowel disease and drug reactions.  The patient understands that monitoring is required including a PPD at baseline and must alert us or the primary physician if symptoms of infection or other concerning signs are noted.
Otezla Counseling: The side effects of Otezla were discussed with the patient, including but not limited to worsening or new depression, weight loss, diarrhea, nausea, upper respiratory tract infection, and headache. Patient instructed to call the office should any adverse effect occur.  The patient verbalized understanding of the proper use and possible adverse effects of Otezla.  All the patient's questions and concerns were addressed.
Doxycycline Counseling:  Patient counseled regarding possible photosensitivity and increased risk for sunburn.  Patient instructed to avoid sunlight, if possible.  When exposed to sunlight, patients should wear protective clothing, sunglasses, and sunscreen.  The patient was instructed to call the office immediately if the following severe adverse effects occur:  hearing changes, easy bruising/bleeding, severe headache, or vision changes.  The patient verbalized understanding of the proper use and possible adverse effects of doxycycline.  All of the patient's questions and concerns were addressed.
Rhofade Counseling: Rhofade is a topical medication which can decrease superficial blood flow where applied. Side effects are uncommon and include stinging, redness and allergic reactions.
Libtayo Pregnancy And Lactation Text: This medication is contraindicated in pregnancy and when breast feeding.
Griseofulvin Counseling:  I discussed with the patient the risks of griseofulvin including but not limited to photosensitivity, cytopenia, liver damage, nausea/vomiting and severe allergy.  The patient understands that this medication is best absorbed when taken with a fatty meal (e.g., ice cream or french fries).
Cimzia Pregnancy And Lactation Text: This medication crosses the placenta but can be considered safe in certain situations. Cimzia may be excreted in breast milk.
Aklief Pregnancy And Lactation Text: It is unknown if this medication is safe to use during pregnancy.  It is unknown if this medication is excreted in breast milk.  Breastfeeding women should use the topical cream on the smallest area of the skin for the shortest time needed while breastfeeding.  Do not apply to nipple and areola.
Siliq Counseling:  I discussed with the patient the risks of Siliq including but not limited to new or worsening depression, suicidal thoughts and behavior, immunosuppression, malignancy, posterior leukoencephalopathy syndrome, and serious infections.  The patient understands that monitoring is required including a PPD at baseline and must alert us or the primary physician if symptoms of infection or other concerning signs are noted. There is also a special program designed to monitor depression which is required with Siliq.
Cantharidin Counseling: Calcipotriene Counseling:  I discussed with the patient the risks of calcipotriene including but not limited to erythema, scaling, itching, and irritation.
Finasteride Male Counseling: Finasteride Counseling:  I discussed with the patient the risks of use of finasteride including but not limited to decreased libido, decreased ejaculate volume, gynecomastia, and depression. Women should not handle medication.  All of the patient's questions and concerns were addressed.
High Dose Vitamin A Pregnancy And Lactation Text: High dose vitamin A therapy is contraindicated during pregnancy and breast feeding.
Rifampin Counseling: I discussed with the patient the risks of rifampin including but not limited to liver damage, kidney damage, red-orange body fluids, nausea/vomiting and severe allergy.
Otezla Pregnancy And Lactation Text: This medication is Pregnancy Category C and it isn't known if it is safe during pregnancy. It is unknown if it is excreted in breast milk.
Mirvaso Pregnancy And Lactation Text: This medication has not been assigned a Pregnancy Risk Category. It is unknown if the medication is excreted in breast milk.
SSKI Counseling:  I discussed with the patient the risks of SSKI including but not limited to thyroid abnormalities, metallic taste, GI upset, fever, headache, acne, arthralgias, paraesthesias, lymphadenopathy, easy bleeding, arrhythmias, and allergic reaction.
Griseofulvin Pregnancy And Lactation Text: This medication is Pregnancy Category X and is known to cause serious birth defects. It is unknown if this medication is excreted in breast milk but breast feeding should be avoided.
Niacinamide Counseling: I recommended taking niacin or niacinamide, also know as vitamin B3, twice daily. Recent evidence suggests that taking vitamin B3 (500 mg twice daily) can reduce the risk of actinic keratoses and non-melanoma skin cancers. Side effects of vitamin B3 include flushing and headache.
Cosentyx Counseling:  I discussed with the patient the risks of Cosentyx including but not limited to worsening of Crohn's disease, immunosuppression, allergic reactions and infections.  The patient understands that monitoring is required including a PPD at baseline and must alert us or the primary physician if symptoms of infection or other concerning signs are noted.
Topical Ketoconazole Counseling: Patient counseled that this medication may cause skin irritation or allergic reactions.  In the event of skin irritation, the patient was advised to reduce the amount of the drug applied or use it less frequently.   The patient verbalized understanding of the proper use and possible adverse effects of ketoconazole.  All of the patient's questions and concerns were addressed.
Hydroquinone Counseling:  Patient advised that medication may result in skin irritation, lightening (hypopigmentation), dryness, and burning.  In the event of skin irritation, the patient was advised to reduce the amount of the drug applied or use it less frequently.  Rarely, spots that are treated with hydroquinone can become darker (pseudoochronosis).  Should this occur, patient instructed to stop medication and call the office. The patient verbalized understanding of the proper use and possible adverse effects of hydroquinone.  All of the patient's questions and concerns were addressed.
Cantharidin Pregnancy And Lactation Text: The use of this medication during pregnancy or lactation is not recommended as there is insufficient data.
Doxepin Counseling:  Patient advised that the medication is sedating and not to drive a car after taking this medication. Patient informed of potential adverse effects including but not limited to dry mouth, urinary retention, and blurry vision.  The patient verbalized understanding of the proper use and possible adverse effects of doxepin.  All of the patient's questions and concerns were addressed.
Doxycycline Pregnancy And Lactation Text: This medication is Pregnancy Category D and not consider safe during pregnancy. It is also excreted in breast milk but is considered safe for shorter treatment courses.
Colchicine Counseling:  Patient counseled regarding adverse effects including but not limited to stomach upset (nausea, vomiting, stomach pain, or diarrhea).  Patient instructed to limit alcohol consumption while taking this medication.  Colchicine may reduce blood counts especially with prolonged use.  The patient understands that monitoring of kidney function and blood counts may be required, especially at baseline. The patient verbalized understanding of the proper use and possible adverse effects of colchicine.  All of the patient's questions and concerns were addressed.
Azithromycin Counseling:  I discussed with the patient the risks of azithromycin including but not limited to GI upset, allergic reaction, drug rash, diarrhea, and yeast infections.
Opzelura Counseling:  I discussed with the patient the risks of Opzelura including but not limited to nasopharngitis, bronchitis, ear infection, eosinophila, hives, diarrhea, folliculitis, tonsillitis, and rhinorrhea.  Taken orally, this medication has been linked to serious infections; higher rate of mortality; malignancy and lymphoproliferative disorders; major adverse cardiovascular events; thrombosis; thrombocytopenia, anemia, and neutropenia; and lipid elevations.
Azelaic Acid Counseling: Patient counseled that medicine may cause skin irritation and to avoid applying near the eyes.  In the event of skin irritation, the patient was advised to reduce the amount of the drug applied or use it less frequently.   The patient verbalized understanding of the proper use and possible adverse effects of azelaic acid.  All of the patient's questions and concerns were addressed.
Finasteride Pregnancy And Lactation Text: This medication is absolutely contraindicated during pregnancy. It is unknown if it is excreted in breast milk.
Rifampin Pregnancy And Lactation Text: This medication is Pregnancy Category C and it isn't know if it is safe during pregnancy. It is also excreted in breast milk and should not be used if you are breast feeding.
Sski Pregnancy And Lactation Text: This medication is Pregnancy Category D and isn't considered safe during pregnancy. It is excreted in breast milk.
Cellcept Counseling:  I discussed with the patient the risks of mycophenolate mofetil including but not limited to infection/immunosuppression, GI upset, hypokalemia, hypercholesterolemia, bone marrow suppression, lymphoproliferative disorders, malignancy, GI ulceration/bleed/perforation, colitis, interstitial lung disease, kidney failure, progressive multifocal leukoencephalopathy, and birth defects.  The patient understands that monitoring is required including a baseline creatinine and regular CBC testing. In addition, patient must alert us immediately if symptoms of infection or other concerning signs are noted.
Infliximab Counseling:  I discussed with the patient the risks of infliximab including but not limited to myelosuppression, immunosuppression, autoimmune hepatitis, demyelinating diseases, lymphoma, and serious infections.  The patient understands that monitoring is required including a PPD at baseline and must alert us or the primary physician if symptoms of infection or other concerning signs are noted.
Oxybutynin Counseling:  I discussed with the patient the risks of oxybutynin including but not limited to skin rash, drowsiness, dry mouth, difficulty urinating, and blurred vision.

## 2022-08-17 NOTE — PROCEDURE: LIQUID NITROGEN
Include Z78.9 (Other Specified Conditions Influencing Health Status) As An Associated Diagnosis?: No
Medical Necessity Information: It is in your best interest to select a reason for this procedure from the list below. All of these items fulfill various CMS LCD requirements except the new and changing color options.
Medical Necessity Clause: This procedure was medically necessary because the lesions that were treated were:
Show Applicator Variable?: Yes
Detail Level: Detailed
Post-Care Instructions: I reviewed with the patient in detail post-care instructions. Patient is to wear sunprotection, and avoid picking at any of the treated lesions. Pt may apply Vaseline to crusted or scabbing areas.
Consent: The patient's consent was obtained including but not limited to risks of crusting, scabbing, blistering, scarring, darker or lighter pigmentary change, recurrence, incomplete removal and infection.
Spray Paint Text: The liquid nitrogen was applied to the skin utilizing a spray paint frosting technique.

## 2022-08-22 ENCOUNTER — APPOINTMENT (RX ONLY)
Dept: URBAN - METROPOLITAN AREA CLINIC 4 | Facility: CLINIC | Age: 72
Setting detail: DERMATOLOGY
End: 2022-08-22

## 2022-10-12 DIAGNOSIS — N52.8 OTHER MALE ERECTILE DYSFUNCTION: ICD-10-CM

## 2022-10-12 RX ORDER — SILDENAFIL 100 MG/1
100 TABLET, FILM COATED ORAL PRN
Qty: 90 TABLET | Refills: 0 | Status: SHIPPED | OUTPATIENT
Start: 2022-10-12 | End: 2023-10-02 | Stop reason: SDUPTHER

## 2022-11-10 ENCOUNTER — PATIENT MESSAGE (OUTPATIENT)
Dept: HEALTH INFORMATION MANAGEMENT | Facility: OTHER | Age: 72
End: 2022-11-10

## 2022-11-21 ENCOUNTER — HOSPITAL ENCOUNTER (OUTPATIENT)
Dept: RADIOLOGY | Facility: MEDICAL CENTER | Age: 72
End: 2022-11-21
Attending: UROLOGY
Payer: MEDICARE

## 2022-11-21 DIAGNOSIS — N20.0 CALCULUS OF KIDNEY: ICD-10-CM

## 2022-11-21 SDOH — ECONOMIC STABILITY: INCOME INSECURITY: HOW HARD IS IT FOR YOU TO PAY FOR THE VERY BASICS LIKE FOOD, HOUSING, MEDICAL CARE, AND HEATING?: NOT HARD AT ALL

## 2022-11-21 SDOH — ECONOMIC STABILITY: TRANSPORTATION INSECURITY
IN THE PAST 12 MONTHS, HAS LACK OF RELIABLE TRANSPORTATION KEPT YOU FROM MEDICAL APPOINTMENTS, MEETINGS, WORK OR FROM GETTING THINGS NEEDED FOR DAILY LIVING?: NO

## 2022-11-21 SDOH — ECONOMIC STABILITY: TRANSPORTATION INSECURITY
IN THE PAST 12 MONTHS, HAS THE LACK OF TRANSPORTATION KEPT YOU FROM MEDICAL APPOINTMENTS OR FROM GETTING MEDICATIONS?: NO

## 2022-11-21 SDOH — ECONOMIC STABILITY: HOUSING INSECURITY: IN THE LAST 12 MONTHS, HOW MANY PLACES HAVE YOU LIVED?: 1

## 2022-11-21 SDOH — HEALTH STABILITY: PHYSICAL HEALTH: ON AVERAGE, HOW MANY MINUTES DO YOU ENGAGE IN EXERCISE AT THIS LEVEL?: 50 MIN

## 2022-11-21 SDOH — HEALTH STABILITY: MENTAL HEALTH
STRESS IS WHEN SOMEONE FEELS TENSE, NERVOUS, ANXIOUS, OR CAN'T SLEEP AT NIGHT BECAUSE THEIR MIND IS TROUBLED. HOW STRESSED ARE YOU?: ONLY A LITTLE

## 2022-11-21 SDOH — HEALTH STABILITY: PHYSICAL HEALTH: ON AVERAGE, HOW MANY DAYS PER WEEK DO YOU ENGAGE IN MODERATE TO STRENUOUS EXERCISE (LIKE A BRISK WALK)?: 3 DAYS

## 2022-11-21 SDOH — ECONOMIC STABILITY: FOOD INSECURITY: WITHIN THE PAST 12 MONTHS, YOU WORRIED THAT YOUR FOOD WOULD RUN OUT BEFORE YOU GOT MONEY TO BUY MORE.: NEVER TRUE

## 2022-11-21 SDOH — ECONOMIC STABILITY: HOUSING INSECURITY
IN THE LAST 12 MONTHS, WAS THERE A TIME WHEN YOU DID NOT HAVE A STEADY PLACE TO SLEEP OR SLEPT IN A SHELTER (INCLUDING NOW)?: NO

## 2022-11-21 SDOH — ECONOMIC STABILITY: FOOD INSECURITY: WITHIN THE PAST 12 MONTHS, THE FOOD YOU BOUGHT JUST DIDN'T LAST AND YOU DIDN'T HAVE MONEY TO GET MORE.: NEVER TRUE

## 2022-11-21 SDOH — ECONOMIC STABILITY: TRANSPORTATION INSECURITY
IN THE PAST 12 MONTHS, HAS LACK OF TRANSPORTATION KEPT YOU FROM MEETINGS, WORK, OR FROM GETTING THINGS NEEDED FOR DAILY LIVING?: NO

## 2022-11-21 SDOH — ECONOMIC STABILITY: INCOME INSECURITY: IN THE LAST 12 MONTHS, WAS THERE A TIME WHEN YOU WERE NOT ABLE TO PAY THE MORTGAGE OR RENT ON TIME?: NO

## 2022-11-21 ASSESSMENT — SOCIAL DETERMINANTS OF HEALTH (SDOH)
HOW OFTEN DO YOU ATTENT MEETINGS OF THE CLUB OR ORGANIZATION YOU BELONG TO?: PATIENT DECLINED
HOW OFTEN DO YOU HAVE A DRINK CONTAINING ALCOHOL: 4 OR MORE TIMES A WEEK
HOW OFTEN DO YOU ATTEND CHURCH OR RELIGIOUS SERVICES?: NEVER
HOW MANY DRINKS CONTAINING ALCOHOL DO YOU HAVE ON A TYPICAL DAY WHEN YOU ARE DRINKING: 1 OR 2
HOW HARD IS IT FOR YOU TO PAY FOR THE VERY BASICS LIKE FOOD, HOUSING, MEDICAL CARE, AND HEATING?: NOT HARD AT ALL
DO YOU BELONG TO ANY CLUBS OR ORGANIZATIONS SUCH AS CHURCH GROUPS UNIONS, FRATERNAL OR ATHLETIC GROUPS, OR SCHOOL GROUPS?: NO
HOW OFTEN DO YOU GET TOGETHER WITH FRIENDS OR RELATIVES?: ONCE A WEEK
HOW OFTEN DO YOU ATTENT MEETINGS OF THE CLUB OR ORGANIZATION YOU BELONG TO?: PATIENT DECLINED
IN A TYPICAL WEEK, HOW MANY TIMES DO YOU TALK ON THE PHONE WITH FAMILY, FRIENDS, OR NEIGHBORS?: TWICE A WEEK
IN A TYPICAL WEEK, HOW MANY TIMES DO YOU TALK ON THE PHONE WITH FAMILY, FRIENDS, OR NEIGHBORS?: TWICE A WEEK
HOW OFTEN DO YOU HAVE SIX OR MORE DRINKS ON ONE OCCASION: NEVER
DO YOU BELONG TO ANY CLUBS OR ORGANIZATIONS SUCH AS CHURCH GROUPS UNIONS, FRATERNAL OR ATHLETIC GROUPS, OR SCHOOL GROUPS?: NO
HOW OFTEN DO YOU GET TOGETHER WITH FRIENDS OR RELATIVES?: ONCE A WEEK
WITHIN THE PAST 12 MONTHS, YOU WORRIED THAT YOUR FOOD WOULD RUN OUT BEFORE YOU GOT THE MONEY TO BUY MORE: NEVER TRUE
HOW OFTEN DO YOU ATTEND CHURCH OR RELIGIOUS SERVICES?: NEVER

## 2022-11-21 ASSESSMENT — LIFESTYLE VARIABLES
HOW MANY STANDARD DRINKS CONTAINING ALCOHOL DO YOU HAVE ON A TYPICAL DAY: 1 OR 2
HOW OFTEN DO YOU HAVE SIX OR MORE DRINKS ON ONE OCCASION: NEVER
HOW OFTEN DO YOU HAVE A DRINK CONTAINING ALCOHOL: 4 OR MORE TIMES A WEEK
AUDIT-C TOTAL SCORE: 4
SKIP TO QUESTIONS 9-10: 1

## 2022-11-22 ENCOUNTER — OFFICE VISIT (OUTPATIENT)
Dept: MEDICAL GROUP | Facility: PHYSICIAN GROUP | Age: 72
End: 2022-11-22
Payer: MEDICARE

## 2022-11-22 VITALS
WEIGHT: 215 LBS | RESPIRATION RATE: 16 BRPM | HEIGHT: 71 IN | TEMPERATURE: 98.7 F | BODY MASS INDEX: 30.1 KG/M2 | HEART RATE: 72 BPM | OXYGEN SATURATION: 94 % | DIASTOLIC BLOOD PRESSURE: 62 MMHG | SYSTOLIC BLOOD PRESSURE: 128 MMHG

## 2022-11-22 DIAGNOSIS — Z01.84 IMMUNITY STATUS TESTING: ICD-10-CM

## 2022-11-22 DIAGNOSIS — E78.00 PURE HYPERCHOLESTEROLEMIA: ICD-10-CM

## 2022-11-22 DIAGNOSIS — Z00.00 HEALTHCARE MAINTENANCE: ICD-10-CM

## 2022-11-22 PROCEDURE — 99213 OFFICE O/P EST LOW 20 MIN: CPT | Performed by: STUDENT IN AN ORGANIZED HEALTH CARE EDUCATION/TRAINING PROGRAM

## 2022-11-22 RX ORDER — NAPROXEN 500 MG/1
TABLET ORAL
COMMUNITY
Start: 2022-08-03 | End: 2023-10-25

## 2022-11-22 RX ORDER — SILDENAFIL 100 MG/1
1 TABLET, FILM COATED ORAL PRN
COMMUNITY
Start: 2022-10-12 | End: 2022-11-22

## 2022-11-22 RX ORDER — TRIAMCINOLONE ACETONIDE 1 MG/G
1 CREAM TOPICAL 2 TIMES DAILY
Qty: 45 G | Refills: 0 | Status: SHIPPED | OUTPATIENT
Start: 2022-11-22 | End: 2023-04-19

## 2022-11-22 RX ORDER — OXYCODONE HYDROCHLORIDE 5 MG/1
TABLET ORAL
COMMUNITY
Start: 2022-09-09 | End: 2022-11-22

## 2022-11-23 NOTE — PROGRESS NOTES
Subjective:     CC:  Diagnoses of Pure hypercholesterolemia, Healthcare maintenance, and Immunity status testing were pertinent to this visit.    HISTORY OF THE PRESENT ILLNESS: Patient is a 72 y.o. male. This pleasant patient is here today to discuss:    1. Pure hypercholesterolemia  Recently started on rosuvastatin 10 mg daily.  Patient reports no myalgias.  He did not get a follow-up lipid profile.    2. Healthcare maintenance  No new concerns or complaints.  Patient has not been able to exercise up to his normal standards due to recent shoulder surgery.  He is considering trying stationary cycling until his shoulder is adequately recovered for his normal exercise regimen.    3. Immunity status testing  Patient believes he has been immunized for hepatitis B secondary to international travel.    Active Diagnosis:    Patient Active Problem List   Diagnosis    Pure hypercholesterolemia    History of trauma    Arthritis    Bunion of great toe    Vitamin B12 deficiency    Murmur    Recurrent kidney stones    Benign prostatic hyperplasia with nocturia    Arteriosclerosis of aorta (HCC)    Ectatic aorta (HCC)    Diverticulosis    Renal cyst, right    Erectile dysfunction    Postoperative pain    Syncope    Bilateral foot pain    Traumatic tear of right rotator cuff, initial encounter    Biceps tendonitis on right    Subacromial impingement of right shoulder    Rotator cuff tear, right      Current Outpatient Medications Ordered in Epic   Medication Sig Dispense Refill    naproxen (NAPROSYN) 500 MG Tab       triamcinolone acetonide (KENALOG) 0.1 % Cream Apply 1 Application topically 2 times a day. 45 g 0    sildenafil citrate (VIAGRA) 100 MG tablet Take 1 Tablet by mouth as needed for Erectile Dysfunction. 90 Tablet 0    rosuvastatin (CRESTOR) 10 MG Tab TAKE ONE TABLET BY MOUTH EVERY EVENING 90 Tablet 2    tamsulosin (FLOMAX) 0.4 MG capsule Take 1 Capsule by mouth 1/2 hour after breakfast. 90 Capsule 3    Omega-3 Fatty  "Acids (FISH OIL) 1200 MG Cap Take 3 Capsules by mouth every day. 2 capsules daily      Misc Natural Products (LUTEIN 20 PO) Take 20 mg by mouth every day.       No current [facility]-ordered facility-administered medications on file.     ROS:   Gen: No fevers/chills, no changes in weight  HEENT: No changes in vision/hearing, sore throat.  Pulm: No cough, unexplained SOB.  CV: No chest pain/pressure, no palpitations  GI: No nausea/vomiting, no diarrhea  : No dysuria/nocturia  MSk: No myalgias  Skin: No rash/skin changes  Neuro: No headaches, no numbness/tingling  Heme/Lymph: no easy bruising      Objective:     Exam: /62 (BP Location: Left arm, Patient Position: Sitting, BP Cuff Size: Adult)   Pulse 72   Temp 37.1 °C (98.7 °F) (Temporal)   Resp 16   Ht 1.803 m (5' 11\")   Wt 97.5 kg (215 lb)   SpO2 94%  Body mass index is 29.99 kg/m².    General: Normal appearing. No distress.  HEENT: Normocephalic. Eyes conjunctiva clear lids without ptosis. Pupils equal and reactive to light accommodation. Ears normal shape and contour. Canals are clear bilaterally, tympanic membranes are benign. Nasal mucosa benign, oropharynx is without erythema, edema or exudates.   Neck: Supple without JVD. Thyroid is not enlarged.  Pulmonary: Clear to ausculation.  Normal effort. No rales, ronchi, or wheezing.  Cardiovascular: Regular rate and rhythm without murmur. Radial pulses are intact and equal bilaterally.  Abdomen: Nondistended   neurologic: Grossly nonfocal.  CN II through XII intact.  Lymph: No cervical or supraclavicular lymph nodes are palpable  Skin: Warm and dry.  No obvious lesions.  Musculoskeletal: Normal gait. No extremity cyanosis, clubbing, or edema.  Psych: Normal mood and affect. Alert and oriented x3. Judgment and insight are normal.    A chaperone was offered to the patient during today's exam. Patient declined chaperone.    Labs:   1/31/2022:  -Lipid profile showing total cholesterol 207, triglycerides 76, HDL " 55, .    Assessment & Plan:   72 y.o. male with the following -    1. Pure hypercholesterolemia  -Chronic, stable.  -Continue rosuvastatin 10 mg daily.  - Lipid Profile; Future    2. Healthcare maintenance  - Comp Metabolic Panel; Future  - CBC WITHOUT DIFFERENTIAL; Future    3. Immunity status testing  - HEP B SURFACE AB; Future    Return in about 1 year (around 11/22/2023).  Per patient preference.    Please note that this dictation was created using voice recognition software. I have made every reasonable attempt to correct obvious errors, but I expect that there are errors of grammar and possibly content that I did not discover before finalizing the note.      Otto An PA-C 11/22/2022

## 2022-11-28 ENCOUNTER — HOSPITAL ENCOUNTER (OUTPATIENT)
Dept: LAB | Facility: MEDICAL CENTER | Age: 72
End: 2022-11-28
Attending: STUDENT IN AN ORGANIZED HEALTH CARE EDUCATION/TRAINING PROGRAM
Payer: MEDICARE

## 2022-11-28 DIAGNOSIS — Z00.00 HEALTHCARE MAINTENANCE: ICD-10-CM

## 2022-11-28 DIAGNOSIS — E78.00 PURE HYPERCHOLESTEROLEMIA: ICD-10-CM

## 2022-11-28 DIAGNOSIS — Z01.84 IMMUNITY STATUS TESTING: ICD-10-CM

## 2022-11-28 LAB
ALBUMIN SERPL BCP-MCNC: 4.4 G/DL (ref 3.2–4.9)
ALBUMIN/GLOB SERPL: 1.6 G/DL
ALP SERPL-CCNC: 51 U/L (ref 30–99)
ALT SERPL-CCNC: 12 U/L (ref 2–50)
ANION GAP SERPL CALC-SCNC: 9 MMOL/L (ref 7–16)
AST SERPL-CCNC: 15 U/L (ref 12–45)
BILIRUB SERPL-MCNC: 0.7 MG/DL (ref 0.1–1.5)
BUN SERPL-MCNC: 17 MG/DL (ref 8–22)
CALCIUM SERPL-MCNC: 9.5 MG/DL (ref 8.5–10.5)
CHLORIDE SERPL-SCNC: 107 MMOL/L (ref 96–112)
CHOLEST SERPL-MCNC: 151 MG/DL (ref 100–199)
CO2 SERPL-SCNC: 26 MMOL/L (ref 20–33)
CREAT SERPL-MCNC: 0.99 MG/DL (ref 0.5–1.4)
ERYTHROCYTE [DISTWIDTH] IN BLOOD BY AUTOMATED COUNT: 42.6 FL (ref 35.9–50)
FASTING STATUS PATIENT QL REPORTED: NORMAL
GFR SERPLBLD CREATININE-BSD FMLA CKD-EPI: 82 ML/MIN/1.73 M 2
GLOBULIN SER CALC-MCNC: 2.8 G/DL (ref 1.9–3.5)
GLUCOSE SERPL-MCNC: 94 MG/DL (ref 65–99)
HBV SURFACE AB SERPL IA-ACNC: 404 MIU/ML (ref 0–10)
HCT VFR BLD AUTO: 45 % (ref 42–52)
HDLC SERPL-MCNC: 57 MG/DL
HGB BLD-MCNC: 15.1 G/DL (ref 14–18)
LDLC SERPL CALC-MCNC: 66 MG/DL
MCH RBC QN AUTO: 30.9 PG (ref 27–33)
MCHC RBC AUTO-ENTMCNC: 33.6 G/DL (ref 33.7–35.3)
MCV RBC AUTO: 92 FL (ref 81.4–97.8)
PLATELET # BLD AUTO: 259 K/UL (ref 164–446)
PMV BLD AUTO: 10.5 FL (ref 9–12.9)
POTASSIUM SERPL-SCNC: 4.9 MMOL/L (ref 3.6–5.5)
PROT SERPL-MCNC: 7.2 G/DL (ref 6–8.2)
RBC # BLD AUTO: 4.89 M/UL (ref 4.7–6.1)
SODIUM SERPL-SCNC: 142 MMOL/L (ref 135–145)
TRIGL SERPL-MCNC: 138 MG/DL (ref 0–149)
WBC # BLD AUTO: 6 K/UL (ref 4.8–10.8)

## 2022-11-28 PROCEDURE — 80061 LIPID PANEL: CPT

## 2022-11-28 PROCEDURE — 80053 COMPREHEN METABOLIC PANEL: CPT

## 2022-11-28 PROCEDURE — 85027 COMPLETE CBC AUTOMATED: CPT

## 2022-11-28 PROCEDURE — 86706 HEP B SURFACE ANTIBODY: CPT | Mod: GA

## 2022-11-28 PROCEDURE — 36415 COLL VENOUS BLD VENIPUNCTURE: CPT

## 2022-12-14 RX ORDER — TAMSULOSIN HYDROCHLORIDE 0.4 MG/1
0.4 CAPSULE ORAL
Qty: 90 CAPSULE | Refills: 3 | Status: SHIPPED | OUTPATIENT
Start: 2022-12-14 | End: 2023-04-04 | Stop reason: SDUPTHER

## 2023-01-26 ENCOUNTER — HOSPITAL ENCOUNTER (OUTPATIENT)
Dept: RADIOLOGY | Facility: MEDICAL CENTER | Age: 73
End: 2023-01-26
Attending: PHYSICAL MEDICINE & REHABILITATION
Payer: MEDICARE

## 2023-01-26 DIAGNOSIS — M51.36 DEGENERATION OF LUMBAR INTERVERTEBRAL DISC: ICD-10-CM

## 2023-01-26 PROCEDURE — 72148 MRI LUMBAR SPINE W/O DYE: CPT

## 2023-04-04 RX ORDER — TAMSULOSIN HYDROCHLORIDE 0.4 MG/1
0.4 CAPSULE ORAL
Qty: 90 CAPSULE | Refills: 0 | Status: SHIPPED | OUTPATIENT
Start: 2023-04-04 | End: 2023-07-17 | Stop reason: SDUPTHER

## 2023-04-19 ENCOUNTER — OFFICE VISIT (OUTPATIENT)
Dept: MEDICAL GROUP | Facility: PHYSICIAN GROUP | Age: 73
End: 2023-04-19
Payer: MEDICARE

## 2023-04-19 VITALS
BODY MASS INDEX: 29.62 KG/M2 | HEIGHT: 71 IN | DIASTOLIC BLOOD PRESSURE: 60 MMHG | HEART RATE: 75 BPM | TEMPERATURE: 97.6 F | WEIGHT: 211.6 LBS | OXYGEN SATURATION: 93 % | SYSTOLIC BLOOD PRESSURE: 140 MMHG | RESPIRATION RATE: 15 BRPM

## 2023-04-19 DIAGNOSIS — N40.1 BENIGN PROSTATIC HYPERPLASIA WITH NOCTURIA: ICD-10-CM

## 2023-04-19 DIAGNOSIS — R35.1 BENIGN PROSTATIC HYPERPLASIA WITH NOCTURIA: ICD-10-CM

## 2023-04-19 DIAGNOSIS — E78.00 PURE HYPERCHOLESTEROLEMIA: ICD-10-CM

## 2023-04-19 DIAGNOSIS — E53.8 VITAMIN B12 DEFICIENCY: ICD-10-CM

## 2023-04-19 PROCEDURE — G0439 PPPS, SUBSEQ VISIT: HCPCS | Performed by: STUDENT IN AN ORGANIZED HEALTH CARE EDUCATION/TRAINING PROGRAM

## 2023-04-19 ASSESSMENT — PATIENT HEALTH QUESTIONNAIRE - PHQ9: CLINICAL INTERPRETATION OF PHQ2 SCORE: 0

## 2023-04-19 ASSESSMENT — FIBROSIS 4 INDEX: FIB4 SCORE: 1.17

## 2023-04-19 ASSESSMENT — ENCOUNTER SYMPTOMS: GENERAL WELL-BEING: FAIR

## 2023-04-19 ASSESSMENT — ACTIVITIES OF DAILY LIVING (ADL): BATHING_REQUIRES_ASSISTANCE: 0

## 2023-07-06 ENCOUNTER — TELEPHONE (OUTPATIENT)
Dept: CARDIOLOGY | Facility: MEDICAL CENTER | Age: 73
End: 2023-07-06
Payer: MEDICARE

## 2023-07-06 DIAGNOSIS — E78.5 DYSLIPIDEMIA: ICD-10-CM

## 2023-07-06 DIAGNOSIS — E78.00 PURE HYPERCHOLESTEROLEMIA: ICD-10-CM

## 2023-07-06 DIAGNOSIS — I65.23 BILATERAL CAROTID ARTERY STENOSIS: ICD-10-CM

## 2023-07-06 NOTE — TELEPHONE ENCOUNTER
AUDRA    Caller: Burton Clifton    Medication Name and Dosage:   rosuvastatin (CRESTOR) 10 MG Tab    Medication amount left: 8    Preferred Pharmacy:  [id number] - [facility] - [address]   [address] [address]   Phone:  [phone]  Fax:  [phone]     Other questions (Topic): N/A    Callback Number (Will only call for issues): [phone]    Thank you,  -Tosin SEE

## 2023-07-12 NOTE — TELEPHONE ENCOUNTER
AUDRA          Caller: Rob Clifton      Topic/issue: Patient was calling about the refill for his rosuvastatin (CRESTOR) 10 MG Tab medication and he was wanting to know if it was still going to be filled because he's down to his last 2 pills      Callback Number: [phone]      Thank you    -Chris SEE

## 2023-07-13 RX ORDER — ROSUVASTATIN CALCIUM 10 MG/1
10 TABLET, COATED ORAL EVERY EVENING
Qty: 90 TABLET | Refills: 0 | Status: SHIPPED | OUTPATIENT
Start: 2023-07-13 | End: 2023-07-25 | Stop reason: SDUPTHER

## 2023-07-13 NOTE — TELEPHONE ENCOUNTER
90 day courtesy refill sent to pharmacy per DA  Return in about 6 months (around 9/3/2022), route to  pool  Lab order placed per protocol and mailed to home address  ---------------------------------------  Called pt [phone]  No answer, left VM to call [phone]  Remind pt of past due FU with DA as noted above      rosuvastatin (CRESTOR) 10 MG Tab 90 Tablet 0/0 7/13/2023     Sig - Route: Take 1 Tablet by mouth every evening. - Oral    Sent to pharmacy as: Rosuvastatin Calcium 10 MG Oral Tablet (Crestor)    Notes to Pharmacy: 90 day courtesy refill. Must call [phone] for further refill.    Cosign for Ordering: Required by Cheko Rocha M.D.    E-Prescribing Status: Receipt confirmed by pharmacy (7/13/2023  3:06 PM PDT)      Pharmacy    [id number] - [place], [place] - [address] [facility]

## 2023-07-14 ENCOUNTER — TELEPHONE (OUTPATIENT)
Dept: CARDIOLOGY | Facility: MEDICAL CENTER | Age: 73
End: 2023-07-14
Payer: MEDICARE

## 2023-07-17 RX ORDER — TAMSULOSIN HYDROCHLORIDE 0.4 MG/1
0.4 CAPSULE ORAL
Qty: 90 CAPSULE | Refills: 1 | Status: SHIPPED | OUTPATIENT
Start: 2023-07-17 | End: 2023-10-25 | Stop reason: SDUPTHER

## 2023-07-20 ENCOUNTER — HOSPITAL ENCOUNTER (OUTPATIENT)
Dept: LAB | Facility: MEDICAL CENTER | Age: 73
End: 2023-07-20
Attending: INTERNAL MEDICINE
Payer: MEDICARE

## 2023-07-20 ENCOUNTER — OFFICE VISIT (OUTPATIENT)
Dept: MEDICAL GROUP | Facility: PHYSICIAN GROUP | Age: 73
End: 2023-07-20
Payer: MEDICARE

## 2023-07-20 VITALS
OXYGEN SATURATION: 95 % | WEIGHT: 214 LBS | BODY MASS INDEX: 29.96 KG/M2 | HEART RATE: 69 BPM | TEMPERATURE: 97.5 F | SYSTOLIC BLOOD PRESSURE: 100 MMHG | DIASTOLIC BLOOD PRESSURE: 66 MMHG | HEIGHT: 71 IN

## 2023-07-20 DIAGNOSIS — I65.23 BILATERAL CAROTID ARTERY STENOSIS: ICD-10-CM

## 2023-07-20 DIAGNOSIS — E78.00 PURE HYPERCHOLESTEROLEMIA: ICD-10-CM

## 2023-07-20 DIAGNOSIS — I70.0 ARTERIOSCLEROSIS OF AORTA (HCC): ICD-10-CM

## 2023-07-20 DIAGNOSIS — G44.209 TENSION HEADACHE: ICD-10-CM

## 2023-07-20 DIAGNOSIS — I77.819 ECTATIC AORTA (HCC): ICD-10-CM

## 2023-07-20 DIAGNOSIS — E78.5 DYSLIPIDEMIA: ICD-10-CM

## 2023-07-20 LAB
ANION GAP SERPL CALC-SCNC: 12 MMOL/L (ref 7–16)
BUN SERPL-MCNC: 27 MG/DL (ref 8–22)
CALCIUM SERPL-MCNC: 9.1 MG/DL (ref 8.5–10.5)
CHLORIDE SERPL-SCNC: 105 MMOL/L (ref 96–112)
CHOLEST SERPL-MCNC: 144 MG/DL (ref 100–199)
CO2 SERPL-SCNC: 25 MMOL/L (ref 20–33)
CREAT SERPL-MCNC: 1.13 MG/DL (ref 0.5–1.4)
FASTING STATUS PATIENT QL REPORTED: NORMAL
GFR SERPLBLD CREATININE-BSD FMLA CKD-EPI: 70 ML/MIN/1.73 M 2
GLUCOSE SERPL-MCNC: 84 MG/DL (ref 65–99)
HDLC SERPL-MCNC: 56 MG/DL
LDLC SERPL CALC-MCNC: 73 MG/DL
POTASSIUM SERPL-SCNC: 4.4 MMOL/L (ref 3.6–5.5)
SODIUM SERPL-SCNC: 142 MMOL/L (ref 135–145)
TRIGL SERPL-MCNC: 73 MG/DL (ref 0–149)

## 2023-07-20 PROCEDURE — 99213 OFFICE O/P EST LOW 20 MIN: CPT | Performed by: STUDENT IN AN ORGANIZED HEALTH CARE EDUCATION/TRAINING PROGRAM

## 2023-07-20 PROCEDURE — 3074F SYST BP LT 130 MM HG: CPT | Performed by: STUDENT IN AN ORGANIZED HEALTH CARE EDUCATION/TRAINING PROGRAM

## 2023-07-20 PROCEDURE — 36415 COLL VENOUS BLD VENIPUNCTURE: CPT

## 2023-07-20 PROCEDURE — 80061 LIPID PANEL: CPT

## 2023-07-20 PROCEDURE — 80048 BASIC METABOLIC PNL TOTAL CA: CPT

## 2023-07-20 PROCEDURE — 3078F DIAST BP <80 MM HG: CPT | Performed by: STUDENT IN AN ORGANIZED HEALTH CARE EDUCATION/TRAINING PROGRAM

## 2023-07-20 ASSESSMENT — FIBROSIS 4 INDEX: FIB4 SCORE: 1.17

## 2023-07-20 NOTE — PROGRESS NOTES
Subjective:     CC:  Diagnoses of Tension headache, Arteriosclerosis of aorta (HCC), and Ectatic aorta (HCC) were pertinent to this visit.    HISTORY OF THE PRESENT ILLNESS: Patient is a 72 y.o. male. This pleasant patient is here today to discuss:    1. Tension headache  Onset approximately 6 weeks ago.  After returning from a trip to [facility].  Of note he did have cataract correction surgery right before leaving for [facility].  Patient feels the sensation is more intense on the left side but is present bilaterally.  He feels symptoms are always present but wax and wane.  He characterizes his discomfort is nonpulsatile pressure.  He denies any visual disturbances or neck symptoms.  He feels symptoms are aggravated by caffeine, EtOH, sildenafil.  Symptoms are relieved by going to bed.  He has attempted no treatment.    2. Arteriosclerosis of aorta (HCC)  Condition present    3. Ectatic aorta (HCC)  Condition present      Active Diagnosis:    Patient Active Problem List   Diagnosis    Pure hypercholesterolemia    History of trauma    Arthritis    Bunion of great toe    Vitamin B12 deficiency    Murmur    Recurrent kidney stones    Benign prostatic hyperplasia with nocturia    Arteriosclerosis of aorta (HCC)    Ectatic aorta (HCC)    Diverticulosis    Renal cyst, right    Erectile dysfunction    Postoperative pain    Syncope    Bilateral foot pain    Traumatic tear of right rotator cuff, initial encounter    Biceps tendonitis on right    Subacromial impingement of right shoulder    Rotator cuff tear, right    Tension headache      Current Outpatient Medications Ordered in Epic   Medication Sig Dispense Refill    tamsulosin (FLOMAX) 0.4 MG capsule Take 1 Capsule by mouth 1/2 hour after breakfast. 90 Capsule 1    rosuvastatin (CRESTOR) 10 MG Tab Take 1 Tablet by mouth every evening. 90 Tablet 0    naproxen (NAPROSYN) 500 MG Tab       sildenafil citrate (VIAGRA) 100 MG tablet Take 1 Tablet by mouth as needed for Erectile  "Dysfunction. 90 Tablet 0    Omega-3 Fatty Acids (FISH OIL) 1200 MG Cap Take 3 Capsules by mouth every day. 2 capsules daily      Misc Natural Products (LUTEIN 20 PO) Take 20 mg by mouth every day.       No current [facility]-ordered facility-administered medications on file.     ROS:   See HPI.    Objective:     Exam: /66 (BP Location: Left arm, Patient Position: Sitting, BP Cuff Size: Adult)   Pulse 69   Temp 36.4 °C (97.5 °F) (Temporal)   Ht 1.803 m (5' 11\")   Wt 97.1 kg (214 lb)   SpO2 95%  Body mass index is 29.85 kg/m².    General: Normal appearing. No distress.  HEENT: Normocephalic. Eyes conjunctiva clear lids without ptosis. Pupils equal and reactive to light accommodation. Ears normal shape and contour. Canals are clear bilaterally, tympanic membranes are benign. Nasal mucosa benign, oropharynx is without erythema, edema or exudates.   Neck: Supple without JVD. Thyroid is not enlarged.  neurologic: Grossly nonfocal.  CN II through XII intact.  Normal coordination, no dysdiadochokinesia.  Lymph: No cervical or supraclavicular lymph nodes are palpable  Skin: Warm and dry.  No obvious lesions.  Musculoskeletal: Normal gait. No extremity cyanosis, clubbing, or edema.  Psych: Normal mood and affect. Alert and oriented x3. Judgment and insight are normal.        Assessment & Plan:   72 y.o. male with the following -    Labs:   7/20/2023:  -BMP showing BUN of 27, otherwise normal.    12/16/2020:  -Renal colic evaluation revealing atherosclerosis of the aorta.    9/22/2015:  -Echocardiogram showing dilated aortic root.    1. Tension headache  -Chronic.  -Symptoms for tension headache diagnosis better than migraine or cluster headache.  -Follow with over-the-counter nonsteroidal anti-inflammatory, naproxen sodium recommended for the next 10 days to see if he can get the symptoms under control.  Otherwise have asked the patient to contact me via [facility] at the 10-day yajaira let me know if this has been an adequate " therapy or not.  If he has not had relief we may consider an alternate agent such as a stronger NSAID or possibly beta-blocker.    2. Arteriosclerosis of aorta (HCC)  -Chronic.    3. Ectatic aorta (HCC)  -Chronic.    Return if symptoms worsen or fail to improve.    Please note that this dictation was created using voice recognition software. I have made every reasonable attempt to correct obvious errors, but I expect that there are errors of grammar and possibly content that I did not discover before finalizing the note.      Otto An PA-C 7/20/2023

## 2023-07-25 ENCOUNTER — OFFICE VISIT (OUTPATIENT)
Dept: CARDIOLOGY | Facility: MEDICAL CENTER | Age: 73
End: 2023-07-25
Attending: NURSE PRACTITIONER
Payer: MEDICARE

## 2023-07-25 VITALS
SYSTOLIC BLOOD PRESSURE: 110 MMHG | DIASTOLIC BLOOD PRESSURE: 70 MMHG | BODY MASS INDEX: 30.24 KG/M2 | HEART RATE: 66 BPM | RESPIRATION RATE: 16 BRPM | WEIGHT: 216 LBS | HEIGHT: 71 IN | OXYGEN SATURATION: 96 %

## 2023-07-25 DIAGNOSIS — E78.5 DYSLIPIDEMIA: ICD-10-CM

## 2023-07-25 DIAGNOSIS — I65.23 ATHEROSCLEROSIS OF BOTH CAROTID ARTERIES: ICD-10-CM

## 2023-07-25 PROCEDURE — 3078F DIAST BP <80 MM HG: CPT | Performed by: NURSE PRACTITIONER

## 2023-07-25 PROCEDURE — 99212 OFFICE O/P EST SF 10 MIN: CPT | Performed by: NURSE PRACTITIONER

## 2023-07-25 PROCEDURE — 99214 OFFICE O/P EST MOD 30 MIN: CPT | Performed by: NURSE PRACTITIONER

## 2023-07-25 PROCEDURE — 3074F SYST BP LT 130 MM HG: CPT | Performed by: NURSE PRACTITIONER

## 2023-07-25 RX ORDER — ROSUVASTATIN CALCIUM 10 MG/1
10 TABLET, COATED ORAL EVERY EVENING
Qty: 90 TABLET | Refills: 2 | Status: ON HOLD
Start: 2023-07-25 | End: 2023-11-14

## 2023-07-25 ASSESSMENT — ENCOUNTER SYMPTOMS
SHORTNESS OF BREATH: 0
CLAUDICATION: 0
ABDOMINAL PAIN: 0
COUGH: 0
DIZZINESS: 0
ORTHOPNEA: 0
PALPITATIONS: 0
MYALGIAS: 0
PND: 0
FEVER: 0

## 2023-07-25 ASSESSMENT — FIBROSIS 4 INDEX: FIB4 SCORE: 1.17
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
## 2023-09-07 ENCOUNTER — APPOINTMENT (RX ONLY)
Dept: URBAN - METROPOLITAN AREA CLINIC 4 | Facility: CLINIC | Age: 73
Setting detail: DERMATOLOGY
End: 2023-09-07

## 2023-09-07 DIAGNOSIS — L57.0 ACTINIC KERATOSIS: ICD-10-CM

## 2023-09-07 DIAGNOSIS — L82.1 OTHER SEBORRHEIC KERATOSIS: ICD-10-CM

## 2023-09-07 DIAGNOSIS — D22 MELANOCYTIC NEVI: ICD-10-CM

## 2023-09-07 DIAGNOSIS — Z71.89 OTHER SPECIFIED COUNSELING: ICD-10-CM

## 2023-09-07 DIAGNOSIS — D18.0 HEMANGIOMA: ICD-10-CM

## 2023-09-07 DIAGNOSIS — L81.4 OTHER MELANIN HYPERPIGMENTATION: ICD-10-CM

## 2023-09-07 PROBLEM — D23.21 OTHER BENIGN NEOPLASM OF SKIN OF RIGHT EAR AND EXTERNAL AURICULAR CANAL: Status: ACTIVE | Noted: 2023-09-07

## 2023-09-07 PROBLEM — D18.01 HEMANGIOMA OF SKIN AND SUBCUTANEOUS TISSUE: Status: ACTIVE | Noted: 2023-09-07

## 2023-09-07 PROBLEM — D23.22 OTHER BENIGN NEOPLASM OF SKIN OF LEFT EAR AND EXTERNAL AURICULAR CANAL: Status: ACTIVE | Noted: 2023-09-07

## 2023-09-07 PROBLEM — D22.61 MELANOCYTIC NEVI OF RIGHT UPPER LIMB, INCLUDING SHOULDER: Status: ACTIVE | Noted: 2023-09-07

## 2023-09-07 PROBLEM — D22.62 MELANOCYTIC NEVI OF LEFT UPPER LIMB, INCLUDING SHOULDER: Status: ACTIVE | Noted: 2023-09-07

## 2023-09-07 PROCEDURE — ? LIQUID NITROGEN

## 2023-09-07 PROCEDURE — ? SUNSCREEN RECOMMENDATIONS

## 2023-09-07 PROCEDURE — 99213 OFFICE O/P EST LOW 20 MIN: CPT | Mod: 25

## 2023-09-07 PROCEDURE — 17000 DESTRUCT PREMALG LESION: CPT

## 2023-09-07 PROCEDURE — ? COUNSELING

## 2023-09-07 PROCEDURE — 17003 DESTRUCT PREMALG LES 2-14: CPT

## 2023-09-07 ASSESSMENT — LOCATION SIMPLE DESCRIPTION DERM
LOCATION SIMPLE: UPPER BACK
LOCATION SIMPLE: RIGHT CHEEK
LOCATION SIMPLE: CHEST
LOCATION SIMPLE: LEFT HAND
LOCATION SIMPLE: LEFT TEMPLE
LOCATION SIMPLE: RIGHT UPPER ARM
LOCATION SIMPLE: LEFT UPPER ARM
LOCATION SIMPLE: RIGHT HAND
LOCATION SIMPLE: ABDOMEN

## 2023-09-07 ASSESSMENT — LOCATION DETAILED DESCRIPTION DERM
LOCATION DETAILED: SUPERIOR THORACIC SPINE
LOCATION DETAILED: STERNUM
LOCATION DETAILED: RIGHT ANTERIOR PROXIMAL UPPER ARM
LOCATION DETAILED: EPIGASTRIC SKIN
LOCATION DETAILED: LEFT ULNAR DORSAL HAND
LOCATION DETAILED: LEFT ANTERIOR PROXIMAL UPPER ARM
LOCATION DETAILED: RIGHT SUPERIOR LATERAL MALAR CHEEK
LOCATION DETAILED: LEFT CENTRAL TEMPLE
LOCATION DETAILED: RIGHT RADIAL DORSAL HAND
LOCATION DETAILED: RIGHT SUPERIOR PREAURICULAR CHEEK

## 2023-09-07 ASSESSMENT — LOCATION ZONE DERM
LOCATION ZONE: ARM
LOCATION ZONE: HAND
LOCATION ZONE: FACE
LOCATION ZONE: TRUNK

## 2023-09-07 NOTE — PROCEDURE: LIQUID NITROGEN
Render Post-Care Instructions In Note?: no
Number Of Freeze-Thaw Cycles: 1 freeze-thaw cycle
Show Aperture Variable?: Yes
Post-Care Instructions: I reviewed with the patient in detail post-care instructions. Patient is to wear sunprotection, and avoid picking at any of the treated lesions. Pt may apply Vaseline to crusted or scabbing areas.
Duration Of Freeze Thaw-Cycle (Seconds): 3
Application Tool (Optional): Cry-AC
Detail Level: Detailed
Aperture Size (Optional): C
Consent: The patient's consent was obtained including but not limited to risks of crusting, scabbing, blistering, scarring, darker or lighter pigmentary change, recurrence, incomplete removal and infection.

## 2023-09-15 ENCOUNTER — DOCUMENTATION (OUTPATIENT)
Dept: HEALTH INFORMATION MANAGEMENT | Facility: OTHER | Age: 73
End: 2023-09-15
Payer: MEDICARE

## 2023-10-25 ENCOUNTER — OFFICE VISIT (OUTPATIENT)
Dept: MEDICAL GROUP | Facility: PHYSICIAN GROUP | Age: 73
End: 2023-10-25
Payer: MEDICARE

## 2023-10-25 VITALS
TEMPERATURE: 97.3 F | BODY MASS INDEX: 29.51 KG/M2 | HEART RATE: 72 BPM | OXYGEN SATURATION: 96 % | WEIGHT: 210.8 LBS | DIASTOLIC BLOOD PRESSURE: 76 MMHG | HEIGHT: 71 IN | SYSTOLIC BLOOD PRESSURE: 110 MMHG

## 2023-10-25 DIAGNOSIS — Z23 NEED FOR VACCINATION: ICD-10-CM

## 2023-10-25 DIAGNOSIS — Z12.5 ENCOUNTER FOR SCREENING FOR MALIGNANT NEOPLASM OF PROSTATE: ICD-10-CM

## 2023-10-25 DIAGNOSIS — N40.1 BENIGN PROSTATIC HYPERPLASIA WITH NOCTURIA: ICD-10-CM

## 2023-10-25 DIAGNOSIS — N52.9 VASCULOGENIC ERECTILE DYSFUNCTION, UNSPECIFIED VASCULOGENIC ERECTILE DYSFUNCTION TYPE: ICD-10-CM

## 2023-10-25 DIAGNOSIS — M79.642 PAIN IN BOTH HANDS: ICD-10-CM

## 2023-10-25 DIAGNOSIS — M79.641 PAIN IN BOTH HANDS: ICD-10-CM

## 2023-10-25 DIAGNOSIS — G44.52 NEW DAILY PERSISTENT HEADACHE: ICD-10-CM

## 2023-10-25 DIAGNOSIS — E78.00 PURE HYPERCHOLESTEROLEMIA: ICD-10-CM

## 2023-10-25 DIAGNOSIS — R35.1 BENIGN PROSTATIC HYPERPLASIA WITH NOCTURIA: ICD-10-CM

## 2023-10-25 PROCEDURE — 3078F DIAST BP <80 MM HG: CPT

## 2023-10-25 PROCEDURE — 90662 IIV NO PRSV INCREASED AG IM: CPT

## 2023-10-25 PROCEDURE — 3074F SYST BP LT 130 MM HG: CPT

## 2023-10-25 PROCEDURE — 99214 OFFICE O/P EST MOD 30 MIN: CPT | Mod: 25

## 2023-10-25 PROCEDURE — G0008 ADMIN INFLUENZA VIRUS VAC: HCPCS

## 2023-10-25 RX ORDER — MELOXICAM 15 MG/1
15 TABLET ORAL DAILY
Qty: 90 TABLET | Refills: 1 | Status: SHIPPED | OUTPATIENT
Start: 2023-10-25 | End: 2024-01-24 | Stop reason: SDUPTHER

## 2023-10-25 RX ORDER — TAMSULOSIN HYDROCHLORIDE 0.4 MG/1
0.4 CAPSULE ORAL
Qty: 90 CAPSULE | Refills: 1 | Status: SHIPPED | OUTPATIENT
Start: 2023-10-25 | End: 2024-01-10 | Stop reason: SDUPTHER

## 2023-10-25 ASSESSMENT — ENCOUNTER SYMPTOMS
COUGH: 0
FOCAL WEAKNESS: 0
SHORTNESS OF BREATH: 0
FEVER: 0
VOMITING: 0
TINGLING: 0
DIARRHEA: 0
BLURRED VISION: 0
SENSORY CHANGE: 0
CONSTIPATION: 0
WEIGHT LOSS: 0
SPEECH CHANGE: 0
NAUSEA: 0
WEAKNESS: 0
ABDOMINAL PAIN: 0
MYALGIAS: 0
DIZZINESS: 0
CHILLS: 0
HEADACHES: 1

## 2023-10-25 ASSESSMENT — FIBROSIS 4 INDEX: FIB4 SCORE: 1.19

## 2023-10-25 NOTE — PROGRESS NOTES
Subjective:     CC:  Diagnoses of Encounter for screening for malignant neoplasm of prostate, Benign prostatic hyperplasia with nocturia, Pain in both hands, New daily persistent headache, Pure hypercholesterolemia, Need for vaccination, and Vasculogenic erectile dysfunction, unspecified vasculogenic erectile dysfunction type were pertinent to this visit.    HISTORY OF THE PRESENT ILLNESS: Patient is a 73 y.o. male. This pleasant patient is here today to establish care and discuss the following problems:    Problem   New Daily Persistent Headache    Reports 4 months of buzzing sensation to occipital region of head.  Sensation is persistent. Worse with caffiene, alcohol, THC, exercise.  Alleviated when patient is actively engaged in an activity (non exertional). Denies loss of hearing loss, no recent visual changes (history of cataracts with correction-stable).  Denies neurological changes or gait difficulty.   No recent URTI or viral illness     Pain in Both Hands    Reports persistent joint difficulty in hands for the past 2-3 months.  Denies parasthesia related to pain but does have CTS but denies correlation to joint discomfort. Digits most are PIP joints, denies discomfort of CMC or DIP joints. Denies weakness.      Erectile Dysfunction    Chronic condition for which patient is taking Viagra 100 mg 1 times daily if needed for ED.  Doing well on this medication without reported side effects.     Benign Prostatic Hyperplasia With Nocturia    Chronic condition for which patient is taking tamsulosin 0.4 mg daily.  He has seen urology in the past.  He does report to get up 1-2 times a night to urinate.     Pure Hypercholesterolemia    Chronic condition for which patient takes rosuvastatin 10 mg daily  - He reports to follow healthy lifestyle and works out regularly  Lab Results   Component Value Date/Time    CHOLSTRLTOT 144 07/20/2023 08:07 AM    CHOLSTRLTOT 151 11/28/2022 08:42 AM    LDL 73 07/20/2023 08:07 AM     "LDL 66 11/28/2022 08:42 AM    HDL 56 07/20/2023 08:07 AM    HDL 57 11/28/2022 08:42 AM    TRIGLYCERIDE 73 07/20/2023 08:07 AM    TRIGLYCERIDE 138 11/28/2022 08:42 AM                Health Maintenance: Completed    ROS:   Review of Systems   Constitutional:  Negative for chills, fever, malaise/fatigue and weight loss.   Eyes:  Negative for blurred vision.   Respiratory:  Negative for cough and shortness of breath.    Cardiovascular:  Negative for chest pain.   Gastrointestinal:  Negative for abdominal pain, constipation, diarrhea, nausea and vomiting.   Musculoskeletal:  Positive for joint pain. Negative for myalgias.   Neurological:  Positive for headaches (\"Buzzing sensation\"). Negative for dizziness, tingling, sensory change, speech change, focal weakness and weakness.         Objective:     Exam: /76 (BP Location: Left arm, Patient Position: Sitting, BP Cuff Size: Adult)   Pulse 72   Temp 36.3 °C (97.3 °F) (Temporal)   Ht 1.803 m (5' 11\")   Wt 95.6 kg (210 lb 12.8 oz)   SpO2 96%  Body mass index is 29.4 kg/m².    Physical Exam  Constitutional:       Appearance: Normal appearance.   HENT:      Head: Normocephalic.   Eyes:      Conjunctiva/sclera: Conjunctivae normal.      Pupils: Pupils are equal, round, and reactive to light.   Cardiovascular:      Rate and Rhythm: Normal rate and regular rhythm.      Heart sounds: No murmur heard.  Pulmonary:      Effort: Pulmonary effort is normal. No respiratory distress.      Breath sounds: Normal breath sounds.   Musculoskeletal:         General: No swelling, tenderness or deformity. Normal range of motion.   Skin:     General: Skin is warm and dry.   Neurological:      General: No focal deficit present.      Mental Status: He is alert and oriented to person, place, and time.   Psychiatric:         Mood and Affect: Mood normal.         Behavior: Behavior normal.           Labs:   Lab Results   Component Value Date/Time    CHOLSTRLTOT 144 07/20/2023 08:07 AM    LDL " "73 07/20/2023 08:07 AM    HDL 56 07/20/2023 08:07 AM    TRIGLYCERIDE 73 07/20/2023 08:07 AM       Lab Results   Component Value Date/Time    SODIUM 142 07/20/2023 08:07 AM    POTASSIUM 4.4 07/20/2023 08:07 AM    CHLORIDE 105 07/20/2023 08:07 AM    CO2 25 07/20/2023 08:07 AM    GLUCOSE 84 07/20/2023 08:07 AM    BUN 27 (H) 07/20/2023 08:07 AM    CREATININE 1.13 07/20/2023 08:07 AM     Lab Results   Component Value Date/Time    ALKPHOSPHAT 51 11/28/2022 08:42 AM    ASTSGOT 15 11/28/2022 08:42 AM    ALTSGPT 12 11/28/2022 08:42 AM    TBILIRUBIN 0.7 11/28/2022 08:42 AM       Assessment & Plan: Medical Decision Making   73 y.o. male with the following -    Problem List Items Addressed This Visit       Pure hypercholesterolemia     Chronic, controlled. On a statin for primary prevention and tolerating it well. The  last cholesterol panel in July 2023 was all within normal limits so we will continue the current dosing.  Recommend continuation of diligent efforts towards dietary improvements (Mediterranean Diet) and moderate intensity exercise at least 30 minutes per day most days of the week.  The ASCVD Risk score (Timur DK, et al., 2019) failed to calculate.             Relevant Orders    Comp Metabolic Panel    Lipid Profile    Benign prostatic hyperplasia with nocturia     Chronic condition appears stable  - We will continue current dose of tamsulosin 0.4 mg  - If condition worsens or fails to improve will increase dose to 0.8 mg daily and likely refer back to urology for further evaluation  - PSA level ordered         Relevant Medications    tamsulosin (FLOMAX) 0.4 MG capsule    Other Relevant Orders    Comp Metabolic Panel    Erectile dysfunction     Chronic condition stable therefore we will continue patient on current dose of Viagra 100 mg 1 times daily if needed for ED.         New daily persistent headache     Undiagnosed condition of uncertain prognosis  -CT head ordered  -ED precautions for \"worst headache ever\", " neurological changes including altered level of consciousness,focal weakness,  worsening or progression of symptoms         Relevant Medications    meloxicam (MOBIC) 15 MG tablet    Other Relevant Orders    CT-HEAD WITH & W/O    Pain in both hands      Undiagnosed condition of uncertain behavior gnosis  Imaging of hands ordered bilateral bony erosions/arthritis  Labs as follows:         Relevant Medications    meloxicam (MOBIC) 15 MG tablet    Other Relevant Orders    DX-JOINT SURVEY-HANDS SINGLE VIEW    RHEUMATOID ARTHRITIS FACTOR    Comp Metabolic Panel     Other Visit Diagnoses       Encounter for screening for malignant neoplasm of prostate        Relevant Orders    PROSTATE SPECIFIC AG SCREENING    Need for vaccination        Relevant Orders    Influenza Vaccine, High Dose (65+ Only) (Completed)            Differential diagnosis, natural history, supportive care, and indications for immediate follow-up discussed.  Shared decision making approach was utilized, and patient is amendable with plan of care.  Patient understands to return to clinic or go to the emergency department if symptoms worsen. All questions and concerns addressed to the best of my knowledge.      Return in about 6 months (around 4/25/2024).    Please note that this dictation was created using voice recognition software. I have made every reasonable attempt to correct obvious errors, but I expect that there are errors of grammar and possibly content that I did not discover before finalizing the note.

## 2023-10-25 NOTE — ASSESSMENT & PLAN NOTE
Chronic condition stable therefore we will continue patient on current dose of Viagra 100 mg 1 times daily if needed for ED.

## 2023-10-25 NOTE — ASSESSMENT & PLAN NOTE
"Undiagnosed condition of uncertain prognosis  -CT head ordered  -ED precautions for \"worst headache ever\", neurological changes including altered level of consciousness,focal weakness,  worsening or progression of symptoms  "

## 2023-10-25 NOTE — ASSESSMENT & PLAN NOTE
Chronic condition appears stable  - We will continue current dose of tamsulosin 0.4 mg  - If condition worsens or fails to improve will increase dose to 0.8 mg daily and likely refer back to urology for further evaluation  - PSA level ordered

## 2023-10-25 NOTE — ASSESSMENT & PLAN NOTE
Undiagnosed condition of uncertain behavior gnosis  Imaging of hands ordered bilateral bony erosions/arthritis  Labs as follows:

## 2023-10-25 NOTE — ASSESSMENT & PLAN NOTE
Chronic, controlled. On a statin for primary prevention and tolerating it well. The  last cholesterol panel in July 2023 was all within normal limits so we will continue the current dosing.  Recommend continuation of diligent efforts towards dietary improvements (Mediterranean Diet) and moderate intensity exercise at least 30 minutes per day most days of the week.  The ASCVD Risk score (Timur ROBISON, et al., 2019) failed to calculate.

## 2023-10-30 ENCOUNTER — HOSPITAL ENCOUNTER (OUTPATIENT)
Dept: LAB | Facility: MEDICAL CENTER | Age: 73
End: 2023-10-30
Payer: MEDICARE

## 2023-10-30 DIAGNOSIS — M79.642 PAIN IN BOTH HANDS: ICD-10-CM

## 2023-10-30 DIAGNOSIS — N40.1 BENIGN PROSTATIC HYPERPLASIA WITH NOCTURIA: ICD-10-CM

## 2023-10-30 DIAGNOSIS — M79.641 PAIN IN BOTH HANDS: ICD-10-CM

## 2023-10-30 DIAGNOSIS — Z12.5 ENCOUNTER FOR SCREENING FOR MALIGNANT NEOPLASM OF PROSTATE: ICD-10-CM

## 2023-10-30 DIAGNOSIS — E78.00 PURE HYPERCHOLESTEROLEMIA: ICD-10-CM

## 2023-10-30 DIAGNOSIS — R35.1 BENIGN PROSTATIC HYPERPLASIA WITH NOCTURIA: ICD-10-CM

## 2023-10-30 LAB
ALBUMIN SERPL BCP-MCNC: 4.3 G/DL (ref 3.2–4.9)
ALBUMIN/GLOB SERPL: 1.6 G/DL
ALP SERPL-CCNC: 49 U/L (ref 30–99)
ALT SERPL-CCNC: 6 U/L (ref 2–50)
ANION GAP SERPL CALC-SCNC: 10 MMOL/L (ref 7–16)
AST SERPL-CCNC: 13 U/L (ref 12–45)
BILIRUB SERPL-MCNC: 0.6 MG/DL (ref 0.1–1.5)
BUN SERPL-MCNC: 21 MG/DL (ref 8–22)
CALCIUM ALBUM COR SERPL-MCNC: 9 MG/DL (ref 8.5–10.5)
CALCIUM SERPL-MCNC: 9.2 MG/DL (ref 8.5–10.5)
CHLORIDE SERPL-SCNC: 105 MMOL/L (ref 96–112)
CHOLEST SERPL-MCNC: 127 MG/DL (ref 100–199)
CO2 SERPL-SCNC: 25 MMOL/L (ref 20–33)
CREAT SERPL-MCNC: 1.08 MG/DL (ref 0.5–1.4)
FASTING STATUS PATIENT QL REPORTED: NORMAL
GFR SERPLBLD CREATININE-BSD FMLA CKD-EPI: 73 ML/MIN/1.73 M 2
GLOBULIN SER CALC-MCNC: 2.7 G/DL (ref 1.9–3.5)
GLUCOSE SERPL-MCNC: 85 MG/DL (ref 65–99)
HDLC SERPL-MCNC: 51 MG/DL
LDLC SERPL CALC-MCNC: 60 MG/DL
POTASSIUM SERPL-SCNC: 4.4 MMOL/L (ref 3.6–5.5)
PROT SERPL-MCNC: 7 G/DL (ref 6–8.2)
PSA SERPL-MCNC: 2.53 NG/ML (ref 0–4)
RHEUMATOID FACT SER IA-ACNC: <10 IU/ML (ref 0–14)
SODIUM SERPL-SCNC: 140 MMOL/L (ref 135–145)
TRIGL SERPL-MCNC: 82 MG/DL (ref 0–149)

## 2023-10-30 PROCEDURE — 80061 LIPID PANEL: CPT

## 2023-10-30 PROCEDURE — 36415 COLL VENOUS BLD VENIPUNCTURE: CPT

## 2023-10-30 PROCEDURE — 80053 COMPREHEN METABOLIC PANEL: CPT

## 2023-10-30 PROCEDURE — 86431 RHEUMATOID FACTOR QUANT: CPT

## 2023-10-30 PROCEDURE — 84153 ASSAY OF PSA TOTAL: CPT | Mod: GA

## 2023-11-03 ENCOUNTER — HOSPITAL ENCOUNTER (OUTPATIENT)
Dept: RADIOLOGY | Facility: MEDICAL CENTER | Age: 73
End: 2023-11-03
Payer: MEDICARE

## 2023-11-03 DIAGNOSIS — G44.52 NEW DAILY PERSISTENT HEADACHE: ICD-10-CM

## 2023-11-03 DIAGNOSIS — M79.641 PAIN IN BOTH HANDS: ICD-10-CM

## 2023-11-03 DIAGNOSIS — M79.642 PAIN IN BOTH HANDS: ICD-10-CM

## 2023-11-03 PROCEDURE — 77077 JOINT SURVEY SINGLE VIEW: CPT

## 2023-11-03 PROCEDURE — 70450 CT HEAD/BRAIN W/O DYE: CPT

## 2023-11-06 DIAGNOSIS — M25.551 RIGHT HIP PAIN: ICD-10-CM

## 2023-11-09 ENCOUNTER — APPOINTMENT (OUTPATIENT)
Dept: MEDICAL GROUP | Facility: PHYSICIAN GROUP | Age: 73
End: 2023-11-09
Payer: MEDICARE

## 2023-11-13 ENCOUNTER — HOSPITAL ENCOUNTER (INPATIENT)
Facility: MEDICAL CENTER | Age: 73
LOS: 1 days | DRG: 322 | End: 2023-11-14
Attending: EMERGENCY MEDICINE | Admitting: STUDENT IN AN ORGANIZED HEALTH CARE EDUCATION/TRAINING PROGRAM
Payer: MEDICARE

## 2023-11-13 ENCOUNTER — APPOINTMENT (OUTPATIENT)
Dept: CARDIOLOGY | Facility: MEDICAL CENTER | Age: 73
DRG: 322 | End: 2023-11-13
Attending: PHYSICIAN ASSISTANT
Payer: MEDICARE

## 2023-11-13 ENCOUNTER — APPOINTMENT (OUTPATIENT)
Dept: RADIOLOGY | Facility: MEDICAL CENTER | Age: 73
DRG: 322 | End: 2023-11-13
Attending: EMERGENCY MEDICINE
Payer: MEDICARE

## 2023-11-13 ENCOUNTER — APPOINTMENT (OUTPATIENT)
Dept: CARDIOLOGY | Facility: MEDICAL CENTER | Age: 73
DRG: 322 | End: 2023-11-13
Attending: STUDENT IN AN ORGANIZED HEALTH CARE EDUCATION/TRAINING PROGRAM
Payer: MEDICARE

## 2023-11-13 DIAGNOSIS — R07.9 CHEST PAIN, UNSPECIFIED TYPE: ICD-10-CM

## 2023-11-13 DIAGNOSIS — I21.4 NSTEMI (NON-ST ELEVATED MYOCARDIAL INFARCTION) (HCC): Primary | ICD-10-CM

## 2023-11-13 LAB
ACT BLD: 206 SEC (ref 74–137)
ACT BLD: 250 SEC (ref 74–137)
ACT BLD: 250 SEC (ref 74–137)
ACT BLD: 255 SEC (ref 74–137)
ALBUMIN SERPL BCP-MCNC: 4.1 G/DL (ref 3.2–4.9)
ALBUMIN/GLOB SERPL: 1.5 G/DL
ALP SERPL-CCNC: 60 U/L (ref 30–99)
ALT SERPL-CCNC: 10 U/L (ref 2–50)
ANION GAP SERPL CALC-SCNC: 12 MMOL/L (ref 7–16)
APTT PPP: 30.4 SEC (ref 24.7–36)
AST SERPL-CCNC: 16 U/L (ref 12–45)
BASOPHILS # BLD AUTO: 0.5 % (ref 0–1.8)
BASOPHILS # BLD: 0.04 K/UL (ref 0–0.12)
BILIRUB SERPL-MCNC: 0.3 MG/DL (ref 0.1–1.5)
BUN SERPL-MCNC: 24 MG/DL (ref 8–22)
CALCIUM ALBUM COR SERPL-MCNC: 8.9 MG/DL (ref 8.5–10.5)
CALCIUM SERPL-MCNC: 9 MG/DL (ref 8.5–10.5)
CHLORIDE SERPL-SCNC: 104 MMOL/L (ref 96–112)
CO2 SERPL-SCNC: 24 MMOL/L (ref 20–33)
CREAT SERPL-MCNC: 1.04 MG/DL (ref 0.5–1.4)
EKG IMPRESSION: NORMAL
EOSINOPHIL # BLD AUTO: 0.13 K/UL (ref 0–0.51)
EOSINOPHIL NFR BLD: 1.6 % (ref 0–6.9)
ERYTHROCYTE [DISTWIDTH] IN BLOOD BY AUTOMATED COUNT: 39.1 FL (ref 35.9–50)
EST. AVERAGE GLUCOSE BLD GHB EST-MCNC: 114 MG/DL
GFR SERPLBLD CREATININE-BSD FMLA CKD-EPI: 77 ML/MIN/1.73 M 2
GLOBULIN SER CALC-MCNC: 2.7 G/DL (ref 1.9–3.5)
GLUCOSE SERPL-MCNC: 112 MG/DL (ref 65–99)
HBA1C MFR BLD: 5.6 % (ref 4–5.6)
HCT VFR BLD AUTO: 42.4 % (ref 42–52)
HGB BLD-MCNC: 14.7 G/DL (ref 14–18)
IMM GRANULOCYTES # BLD AUTO: 0.02 K/UL (ref 0–0.11)
IMM GRANULOCYTES NFR BLD AUTO: 0.2 % (ref 0–0.9)
INR PPP: 0.97 (ref 0.87–1.13)
LV EJECT FRACT  99904: 55
LV EJECT FRACT MOD 2C 99903: 45.31
LV EJECT FRACT MOD 4C 99902: 51.51
LV EJECT FRACT MOD BP 99901: 51.05
LYMPHOCYTES # BLD AUTO: 1.52 K/UL (ref 1–4.8)
LYMPHOCYTES NFR BLD: 18.9 % (ref 22–41)
MAGNESIUM SERPL-MCNC: 2 MG/DL (ref 1.5–2.5)
MCH RBC QN AUTO: 30.6 PG (ref 27–33)
MCHC RBC AUTO-ENTMCNC: 34.7 G/DL (ref 32.3–36.5)
MCV RBC AUTO: 88.3 FL (ref 81.4–97.8)
MONOCYTES # BLD AUTO: 0.63 K/UL (ref 0–0.85)
MONOCYTES NFR BLD AUTO: 7.8 % (ref 0–13.4)
NEUTROPHILS # BLD AUTO: 5.69 K/UL (ref 1.82–7.42)
NEUTROPHILS NFR BLD: 71 % (ref 44–72)
NRBC # BLD AUTO: 0 K/UL
NRBC BLD-RTO: 0 /100 WBC (ref 0–0.2)
PLATELET # BLD AUTO: 208 K/UL (ref 164–446)
PMV BLD AUTO: 9.6 FL (ref 9–12.9)
POTASSIUM SERPL-SCNC: 3.9 MMOL/L (ref 3.6–5.5)
PROT SERPL-MCNC: 6.8 G/DL (ref 6–8.2)
PROTHROMBIN TIME: 13 SEC (ref 12–14.6)
RBC # BLD AUTO: 4.8 M/UL (ref 4.7–6.1)
SODIUM SERPL-SCNC: 140 MMOL/L (ref 135–145)
TROPONIN T SERPL-MCNC: 14 NG/L (ref 6–19)
TROPONIN T SERPL-MCNC: 192 NG/L (ref 6–19)
TROPONIN T SERPL-MCNC: 233 NG/L (ref 6–19)
TROPONIN T SERPL-MCNC: 29 NG/L (ref 6–19)
TROPONIN T SERPL-MCNC: 292 NG/L (ref 6–19)
TROPONIN T SERPL-MCNC: 83 NG/L (ref 6–19)
UFH PPP CHRO-ACNC: 0.36 IU/ML
UFH PPP CHRO-ACNC: <0.1 IU/ML
WBC # BLD AUTO: 8 K/UL (ref 4.8–10.8)

## 2023-11-13 PROCEDURE — 92928 PRQ TCAT PLMT NTRAC ST 1 LES: CPT | Mod: LC | Performed by: INTERNAL MEDICINE

## 2023-11-13 PROCEDURE — 93458 L HRT ARTERY/VENTRICLE ANGIO: CPT | Mod: 26,59 | Performed by: INTERNAL MEDICINE

## 2023-11-13 PROCEDURE — 99152 MOD SED SAME PHYS/QHP 5/>YRS: CPT | Performed by: INTERNAL MEDICINE

## 2023-11-13 PROCEDURE — 027034Z DILATION OF CORONARY ARTERY, ONE ARTERY WITH DRUG-ELUTING INTRALUMINAL DEVICE, PERCUTANEOUS APPROACH: ICD-10-PCS | Performed by: INTERNAL MEDICINE

## 2023-11-13 PROCEDURE — 93005 ELECTROCARDIOGRAM TRACING: CPT

## 2023-11-13 PROCEDURE — 99285 EMERGENCY DEPT VISIT HI MDM: CPT

## 2023-11-13 PROCEDURE — 4A023N7 MEASUREMENT OF CARDIAC SAMPLING AND PRESSURE, LEFT HEART, PERCUTANEOUS APPROACH: ICD-10-PCS | Performed by: INTERNAL MEDICINE

## 2023-11-13 PROCEDURE — 36415 COLL VENOUS BLD VENIPUNCTURE: CPT

## 2023-11-13 PROCEDURE — 93005 ELECTROCARDIOGRAM TRACING: CPT | Performed by: EMERGENCY MEDICINE

## 2023-11-13 PROCEDURE — 80053 COMPREHEN METABOLIC PANEL: CPT

## 2023-11-13 PROCEDURE — 92978 ENDOLUMINL IVUS OCT C 1ST: CPT | Mod: 26,LC | Performed by: INTERNAL MEDICINE

## 2023-11-13 PROCEDURE — 83036 HEMOGLOBIN GLYCOSYLATED A1C: CPT

## 2023-11-13 PROCEDURE — 770020 HCHG ROOM/CARE - TELE (206)

## 2023-11-13 PROCEDURE — A9270 NON-COVERED ITEM OR SERVICE: HCPCS | Performed by: STUDENT IN AN ORGANIZED HEALTH CARE EDUCATION/TRAINING PROGRAM

## 2023-11-13 PROCEDURE — 700111 HCHG RX REV CODE 636 W/ 250 OVERRIDE (IP): Performed by: EMERGENCY MEDICINE

## 2023-11-13 PROCEDURE — 93005 ELECTROCARDIOGRAM TRACING: CPT | Performed by: STUDENT IN AN ORGANIZED HEALTH CARE EDUCATION/TRAINING PROGRAM

## 2023-11-13 PROCEDURE — 700105 HCHG RX REV CODE 258: Performed by: INTERNAL MEDICINE

## 2023-11-13 PROCEDURE — B2111ZZ FLUOROSCOPY OF MULTIPLE CORONARY ARTERIES USING LOW OSMOLAR CONTRAST: ICD-10-PCS | Performed by: INTERNAL MEDICINE

## 2023-11-13 PROCEDURE — A9270 NON-COVERED ITEM OR SERVICE: HCPCS

## 2023-11-13 PROCEDURE — 99223 1ST HOSP IP/OBS HIGH 75: CPT | Mod: 25 | Performed by: INTERNAL MEDICINE

## 2023-11-13 PROCEDURE — 93306 TTE W/DOPPLER COMPLETE: CPT

## 2023-11-13 PROCEDURE — A9270 NON-COVERED ITEM OR SERVICE: HCPCS | Performed by: EMERGENCY MEDICINE

## 2023-11-13 PROCEDURE — 96365 THER/PROPH/DIAG IV INF INIT: CPT

## 2023-11-13 PROCEDURE — B2151ZZ FLUOROSCOPY OF LEFT HEART USING LOW OSMOLAR CONTRAST: ICD-10-PCS | Performed by: INTERNAL MEDICINE

## 2023-11-13 PROCEDURE — B240ZZ3 ULTRASONOGRAPHY OF SINGLE CORONARY ARTERY, INTRAVASCULAR: ICD-10-PCS | Performed by: INTERNAL MEDICINE

## 2023-11-13 PROCEDURE — 85610 PROTHROMBIN TIME: CPT

## 2023-11-13 PROCEDURE — 83735 ASSAY OF MAGNESIUM: CPT

## 2023-11-13 PROCEDURE — 700111 HCHG RX REV CODE 636 W/ 250 OVERRIDE (IP): Mod: JZ

## 2023-11-13 PROCEDURE — 700102 HCHG RX REV CODE 250 W/ 637 OVERRIDE(OP): Performed by: EMERGENCY MEDICINE

## 2023-11-13 PROCEDURE — 85730 THROMBOPLASTIN TIME PARTIAL: CPT

## 2023-11-13 PROCEDURE — 85520 HEPARIN ASSAY: CPT | Mod: 91

## 2023-11-13 PROCEDURE — 99153 MOD SED SAME PHYS/QHP EA: CPT

## 2023-11-13 PROCEDURE — 99291 CRITICAL CARE FIRST HOUR: CPT | Performed by: STUDENT IN AN ORGANIZED HEALTH CARE EDUCATION/TRAINING PROGRAM

## 2023-11-13 PROCEDURE — 93306 TTE W/DOPPLER COMPLETE: CPT | Mod: 26 | Performed by: INTERNAL MEDICINE

## 2023-11-13 PROCEDURE — 71045 X-RAY EXAM CHEST 1 VIEW: CPT

## 2023-11-13 PROCEDURE — 700101 HCHG RX REV CODE 250

## 2023-11-13 PROCEDURE — 96366 THER/PROPH/DIAG IV INF ADDON: CPT

## 2023-11-13 PROCEDURE — 93005 ELECTROCARDIOGRAM TRACING: CPT | Performed by: INTERNAL MEDICINE

## 2023-11-13 PROCEDURE — 700102 HCHG RX REV CODE 250 W/ 637 OVERRIDE(OP)

## 2023-11-13 PROCEDURE — 85025 COMPLETE CBC W/AUTO DIFF WBC: CPT

## 2023-11-13 PROCEDURE — 700102 HCHG RX REV CODE 250 W/ 637 OVERRIDE(OP): Performed by: STUDENT IN AN ORGANIZED HEALTH CARE EDUCATION/TRAINING PROGRAM

## 2023-11-13 PROCEDURE — 700117 HCHG RX CONTRAST REV CODE 255: Performed by: INTERNAL MEDICINE

## 2023-11-13 PROCEDURE — 85347 COAGULATION TIME ACTIVATED: CPT | Mod: 91

## 2023-11-13 PROCEDURE — 84484 ASSAY OF TROPONIN QUANT: CPT | Mod: 91

## 2023-11-13 RX ORDER — LIDOCAINE HYDROCHLORIDE 20 MG/ML
INJECTION, SOLUTION INFILTRATION; PERINEURAL
Status: COMPLETED
Start: 2023-11-13 | End: 2023-11-13

## 2023-11-13 RX ORDER — PRASUGREL 10 MG/1
TABLET, FILM COATED ORAL
Status: COMPLETED
Start: 2023-11-13 | End: 2023-11-13

## 2023-11-13 RX ORDER — SODIUM CHLORIDE 9 MG/ML
3 INJECTION, SOLUTION INTRAVENOUS CONTINUOUS
Status: ACTIVE | OUTPATIENT
Start: 2023-11-13 | End: 2023-11-13

## 2023-11-13 RX ORDER — HEPARIN SODIUM 1000 [USP'U]/ML
INJECTION, SOLUTION INTRAVENOUS; SUBCUTANEOUS
Status: COMPLETED
Start: 2023-11-13 | End: 2023-11-13

## 2023-11-13 RX ORDER — NITROGLYCERIN 0.4 MG/1
0.4 TABLET SUBLINGUAL ONCE
Status: DISCONTINUED | OUTPATIENT
Start: 2023-11-13 | End: 2023-11-13

## 2023-11-13 RX ORDER — HEPARIN SODIUM 200 [USP'U]/100ML
INJECTION, SOLUTION INTRAVENOUS
Status: COMPLETED
Start: 2023-11-13 | End: 2023-11-13

## 2023-11-13 RX ORDER — ROSUVASTATIN CALCIUM 20 MG/1
20 TABLET, COATED ORAL EVERY EVENING
Status: DISCONTINUED | OUTPATIENT
Start: 2023-11-13 | End: 2023-11-14 | Stop reason: HOSPADM

## 2023-11-13 RX ORDER — PRASUGREL 10 MG/1
60 TABLET, FILM COATED ORAL ONCE
Status: DISCONTINUED | OUTPATIENT
Start: 2023-11-13 | End: 2023-11-13

## 2023-11-13 RX ORDER — HEPARIN SODIUM 5000 [USP'U]/100ML
0-30 INJECTION, SOLUTION INTRAVENOUS CONTINUOUS
Status: DISCONTINUED | OUTPATIENT
Start: 2023-11-13 | End: 2023-11-13

## 2023-11-13 RX ORDER — VERAPAMIL HYDROCHLORIDE 2.5 MG/ML
INJECTION, SOLUTION INTRAVENOUS
Status: COMPLETED
Start: 2023-11-13 | End: 2023-11-13

## 2023-11-13 RX ORDER — ROSUVASTATIN CALCIUM 20 MG/1
10 TABLET, COATED ORAL EVERY EVENING
Status: DISCONTINUED | OUTPATIENT
Start: 2023-11-13 | End: 2023-11-13

## 2023-11-13 RX ORDER — TAMSULOSIN HYDROCHLORIDE 0.4 MG/1
0.4 CAPSULE ORAL
Status: DISCONTINUED | OUTPATIENT
Start: 2023-11-13 | End: 2023-11-14 | Stop reason: HOSPADM

## 2023-11-13 RX ORDER — MIDAZOLAM HYDROCHLORIDE 1 MG/ML
INJECTION INTRAMUSCULAR; INTRAVENOUS
Status: COMPLETED
Start: 2023-11-13 | End: 2023-11-13

## 2023-11-13 RX ORDER — PRASUGREL 10 MG/1
10 TABLET, FILM COATED ORAL DAILY
Status: DISCONTINUED | OUTPATIENT
Start: 2023-11-14 | End: 2023-11-14 | Stop reason: HOSPADM

## 2023-11-13 RX ORDER — ASPIRIN 81 MG/1
81 TABLET ORAL DAILY
Status: DISCONTINUED | OUTPATIENT
Start: 2023-11-14 | End: 2023-11-14 | Stop reason: HOSPADM

## 2023-11-13 RX ORDER — MORPHINE SULFATE 4 MG/ML
1 INJECTION INTRAVENOUS EVERY 4 HOURS PRN
Status: DISCONTINUED | OUTPATIENT
Start: 2023-11-13 | End: 2023-11-14 | Stop reason: HOSPADM

## 2023-11-13 RX ORDER — HEPARIN SODIUM 1000 [USP'U]/ML
4000 INJECTION, SOLUTION INTRAVENOUS; SUBCUTANEOUS ONCE
Status: COMPLETED | OUTPATIENT
Start: 2023-11-13 | End: 2023-11-13

## 2023-11-13 RX ORDER — ASPIRIN 81 MG/1
TABLET, CHEWABLE ORAL
Status: COMPLETED
Start: 2023-11-13 | End: 2023-11-13

## 2023-11-13 RX ORDER — HEPARIN SODIUM 1000 [USP'U]/ML
2000 INJECTION, SOLUTION INTRAVENOUS; SUBCUTANEOUS PRN
Status: DISCONTINUED | OUTPATIENT
Start: 2023-11-13 | End: 2023-11-13

## 2023-11-13 RX ORDER — SODIUM CHLORIDE 9 MG/ML
INJECTION, SOLUTION INTRAVENOUS ONCE
Status: DISCONTINUED | OUTPATIENT
Start: 2023-11-13 | End: 2023-11-13

## 2023-11-13 RX ADMIN — MIDAZOLAM HYDROCHLORIDE 1.5 MG: 1 INJECTION, SOLUTION INTRAMUSCULAR; INTRAVENOUS at 13:30

## 2023-11-13 RX ADMIN — VERAPAMIL HYDROCHLORIDE 2.5 MG: 2.5 INJECTION, SOLUTION INTRAVENOUS at 11:42

## 2023-11-13 RX ADMIN — PRASUGREL 60 MG: 10 TABLET, FILM COATED ORAL at 13:31

## 2023-11-13 RX ADMIN — METOPROLOL TARTRATE 12.5 MG: 25 TABLET, FILM COATED ORAL at 17:06

## 2023-11-13 RX ADMIN — ROSUVASTATIN CALCIUM 20 MG: 20 TABLET, FILM COATED ORAL at 17:06

## 2023-11-13 RX ADMIN — HEPARIN SODIUM: 1000 INJECTION, SOLUTION INTRAVENOUS; SUBCUTANEOUS at 12:56

## 2023-11-13 RX ADMIN — METOPROLOL TARTRATE 12.5 MG: 25 TABLET, FILM COATED ORAL at 10:45

## 2023-11-13 RX ADMIN — SODIUM CHLORIDE 3 ML/KG/HR: 9 INJECTION, SOLUTION INTRAVENOUS at 15:23

## 2023-11-13 RX ADMIN — FENTANYL CITRATE 100 MCG: 50 INJECTION, SOLUTION INTRAMUSCULAR; INTRAVENOUS at 11:42

## 2023-11-13 RX ADMIN — HEPARIN SODIUM 12 UNITS/KG/HR: 5000 INJECTION, SOLUTION INTRAVENOUS at 03:28

## 2023-11-13 RX ADMIN — HEPARIN SODIUM 2000 UNITS: 200 INJECTION, SOLUTION INTRAVENOUS at 11:41

## 2023-11-13 RX ADMIN — FENTANYL CITRATE 100 MCG: 50 INJECTION, SOLUTION INTRAMUSCULAR; INTRAVENOUS at 12:49

## 2023-11-13 RX ADMIN — MIDAZOLAM HYDROCHLORIDE 2 MG: 1 INJECTION, SOLUTION INTRAMUSCULAR; INTRAVENOUS at 12:10

## 2023-11-13 RX ADMIN — FENTANYL CITRATE 75 MCG: 50 INJECTION, SOLUTION INTRAMUSCULAR; INTRAVENOUS at 13:30

## 2023-11-13 RX ADMIN — HEPARIN SODIUM: 1000 INJECTION, SOLUTION INTRAVENOUS; SUBCUTANEOUS at 11:42

## 2023-11-13 RX ADMIN — LIDOCAINE HYDROCHLORIDE: 20 INJECTION, SOLUTION INFILTRATION; PERINEURAL at 11:41

## 2023-11-13 RX ADMIN — HEPARIN SODIUM 4000 UNITS: 1000 INJECTION, SOLUTION INTRAVENOUS; SUBCUTANEOUS at 03:25

## 2023-11-13 RX ADMIN — TAMSULOSIN HYDROCHLORIDE 0.4 MG: 0.4 CAPSULE ORAL at 09:27

## 2023-11-13 RX ADMIN — ASPIRIN 81 MG: 81 TABLET, CHEWABLE ORAL at 11:31

## 2023-11-13 RX ADMIN — MIDAZOLAM HYDROCHLORIDE 2 MG: 1 INJECTION, SOLUTION INTRAMUSCULAR; INTRAVENOUS at 11:42

## 2023-11-13 RX ADMIN — IOHEXOL 142 ML: 350 INJECTION, SOLUTION INTRAVENOUS at 13:30

## 2023-11-13 RX ADMIN — NITROGLYCERIN 10 ML: 20 INJECTION INTRAVENOUS at 11:41

## 2023-11-13 ASSESSMENT — LIFESTYLE VARIABLES
TOTAL SCORE: 0
CONSUMPTION TOTAL: NEGATIVE
ON A TYPICAL DAY WHEN YOU DRINK ALCOHOL HOW MANY DRINKS DO YOU HAVE: 0
HAVE PEOPLE ANNOYED YOU BY CRITICIZING YOUR DRINKING: NO
ALCOHOL_USE: NO
EVER HAD A DRINK FIRST THING IN THE MORNING TO STEADY YOUR NERVES TO GET RID OF A HANGOVER: NO
EVER FELT BAD OR GUILTY ABOUT YOUR DRINKING: NO
TOTAL SCORE: 0
TOTAL SCORE: 0
HAVE YOU EVER FELT YOU SHOULD CUT DOWN ON YOUR DRINKING: NO
HOW MANY TIMES IN THE PAST YEAR HAVE YOU HAD 5 OR MORE DRINKS IN A DAY: 0
DOES PATIENT WANT TO STOP DRINKING: NO
AVERAGE NUMBER OF DAYS PER WEEK YOU HAVE A DRINK CONTAINING ALCOHOL: 0

## 2023-11-13 ASSESSMENT — ENCOUNTER SYMPTOMS
FEVER: 0
PSYCHIATRIC NEGATIVE: 1
NEUROLOGICAL NEGATIVE: 1
GASTROINTESTINAL NEGATIVE: 1
CHILLS: 0
RESPIRATORY NEGATIVE: 1
EYES NEGATIVE: 1
MUSCULOSKELETAL NEGATIVE: 1

## 2023-11-13 ASSESSMENT — PAIN DESCRIPTION - PAIN TYPE
TYPE: ACUTE PAIN
TYPE: ACUTE PAIN

## 2023-11-13 ASSESSMENT — PAIN DESCRIPTION - DESCRIPTORS: DESCRIPTORS: SHARP

## 2023-11-13 ASSESSMENT — COGNITIVE AND FUNCTIONAL STATUS - GENERAL
SUGGESTED CMS G CODE MODIFIER MOBILITY: CJ
DAILY ACTIVITIY SCORE: 24
WALKING IN HOSPITAL ROOM: A LITTLE
MOVING FROM LYING ON BACK TO SITTING ON SIDE OF FLAT BED: A LITTLE
TURNING FROM BACK TO SIDE WHILE IN FLAT BAD: A LITTLE
MOBILITY SCORE: 21
SUGGESTED CMS G CODE MODIFIER DAILY ACTIVITY: CH

## 2023-11-13 ASSESSMENT — PATIENT HEALTH QUESTIONNAIRE - PHQ9
2. FEELING DOWN, DEPRESSED, IRRITABLE, OR HOPELESS: NOT AT ALL
SUM OF ALL RESPONSES TO PHQ9 QUESTIONS 1 AND 2: 0
SUM OF ALL RESPONSES TO PHQ9 QUESTIONS 1 AND 2: 0
2. FEELING DOWN, DEPRESSED, IRRITABLE, OR HOPELESS: NOT AT ALL
1. LITTLE INTEREST OR PLEASURE IN DOING THINGS: NOT AT ALL
1. LITTLE INTEREST OR PLEASURE IN DOING THINGS: NOT AT ALL

## 2023-11-13 ASSESSMENT — FIBROSIS 4 INDEX: FIB4 SCORE: 1.45

## 2023-11-13 NOTE — ED TRIAGE NOTES
"Chief Complaint   Patient presents with    Chest Pain     Pt BIBA after sharp CP woke pt from sleep. Denies cardiac hx, denies SOB/N/V/dizziness     BP (!) 142/78   Pulse 65   Resp 17   Ht 1.803 m (5' 11\")   Wt 93 kg (205 lb 0.4 oz)   SpO2 96%   BMI 28.60 kg/m²       Pt BIBA from home for above cc  Pt called after sharp CP  woke him from sleep PTA  Pt denies any cardiac hx or family hx    Pt describes as sharp pain that radiates to R side of chest, pt is not a smoker, denies any SOB, N/V, dizziness, back pain    Pt took 1000 tylenol at home PTA    EMS gave 324 ASA en route  Nitro not given as pt endorses taking Viagra earlier this evening    Pt is A&O x4, GCS 15, pain is now 4/10 from 8/10 after interventions        CP/EKG protocol ordered UA   "

## 2023-11-13 NOTE — ED PROVIDER NOTES
ED Provider Note    Scribed for Percy Wright by Percy Wright. 11/13/2023  1:05 AM    Primary care provider: Jake Haines D.N.P.  Means of arrival: Ambulance   History obtained from: Patient  History limited by: None    CHIEF COMPLAINT  Chief Complaint   Patient presents with    Chest Pain     Pt BIBA after sharp CP woke pt from sleep. Denies cardiac hx, denies SOB/N/V/dizziness     EXTERNAL RECORDS REVIEWED  Outpatient Notes patient was seen at the [facility] for left shoulder pain less than a week ago    HPI/ROS  LIMITATION TO HISTORY   Select: : None  OUTSIDE HISTORIAN(S):  Family wife at bedside helps provide collateral formation regarding patient's course of illness    HPI  Rob Clifton is a 73 y.o. male who presents to the Emergency Department with no cardiac history presenting for acute sharp right-sided chest pain that woke him from sleep around 1130 tonight, that radiates to the right arm and down to the elbow.  No history of similar presentation.  Patient is fairly healthy for his age.  Did have rotator cuff surgery on the right about a year ago but no similar symptoms at that time.  He has been in normal health throughout the day, him and his wife went to a show together, he had 2-3 drinks, watch the football game and went to bed at around 1015 this evening.  He woke around 11:30 PM with symptoms.  He had no concomitant symptoms such as shortness of breath, diaphoresis, nausea, vomiting, diarrhea, denies fevers, no recent sick contacts or illness, no flulike symptoms.  No cardiac history.  EMS was called and he received 4 aspirin and was transported here.  EKG in the field showed no STEMI and vital signs were reported as normal.  He denies tobacco or drug use.  Drinks about 2 drinks per day on average.    REVIEW OF SYSTEMS  As above, all other systems reviewed and are negative.   See HPI for further details.     PAST MEDICAL HISTORY   has a past medical history of Achilles  tendon infection (04/2020), Arthritis, Heart burn, Hernia, abdominal, High cholesterol, and Kidney stones.  SURGICAL HISTORY   has a past surgical history that includes irrigation & debridement ortho (Left, 4/9/2020); other orthopedic surgery (Bilateral, 1998); other orthopedic surgery (Left, 1982); other orthopedic surgery (2001, 2015); inguinal hernia repair robotic xi (Right, 9/9/2021); shldr arthroscop,surg,w/rotat cuff repr (Right, 8/3/2022); repair biceps long tendon (8/3/2022); and shldr arthroscop,part acromioplas (8/3/2022).  SOCIAL HISTORY  Social History     Tobacco Use    Smoking status: Never    Smokeless tobacco: Never   Vaping Use    Vaping Use: Some days    Substances: THC    Devices: Disposable   Substance Use Topics    Alcohol use: Yes     Alcohol/week: 8.4 oz     Types: 14 Glasses of wine per week     Comment: 1-2 daily    Drug use: Yes     Types: Marijuana, Oral, Inhaled     Comment: marijuana occ., last use 2 days ago      Social History     Substance and Sexual Activity   Drug Use Yes    Types: Marijuana, Oral, Inhaled    Comment: marijuana occ., last use 2 days ago     FAMILY HISTORY  Family History   Problem Relation Age of Onset    No Known Problems Mother     No Known Problems Father     Other Maternal Grandmother         Possibly TB    No Known Problems Maternal Grandfather     No Known Problems Paternal Grandmother     No Known Problems Paternal Grandfather     Heart Disease Neg Hx     Cancer Neg Hx     Diabetes Neg Hx     Stroke Neg Hx      CURRENT MEDICATIONS  Home Medications       Reviewed by Ca Sanchez PhT (Pharmacy Tech) on 11/13/23 at 0309  Med List Status: Complete     Medication Last Dose Status   Doxylamine Succinate, Sleep, (SLEEP AID PO) 11/12/2023 Active   meloxicam (MOBIC) 15 MG tablet 11/12/2023 Active   Misc Natural Products (LUTEIN 20 PO) 11/12/2023 Active   rosuvastatin (CRESTOR) 10 MG Tab 11/12/2023 Active   sildenafil citrate (VIAGRA) 100 MG tablet PRN Active    tamsulosin (FLOMAX) 0.4 MG capsule 11/12/2023 Active                  ALLERGIES  Allergies   Allergen Reactions    Bactrim Ds Unspecified     Patient states jaundice       PHYSICAL EXAM    VITAL SIGNS:   Vitals:    11/13/23 1700 11/13/23 1730 11/13/23 1800 11/13/23 1923   BP: 114/66 127/71 125/75 128/70   Pulse: (!) 55 (!) 57 (!) 56 (!) 56   Resp:    16   Temp:    36.8 °C (98.2 °F)   TempSrc:    Temporal   SpO2: 95%   98%   Weight:       Height:         Vitals: My interpretation: normotensive, not tachycardic, afebrile, not hypoxic    Reinterpretation of vitals: Unchanged    Cardiac Monitor Interpretation: The cardiac monitor revealed normal Sinus Rhythm  as interpreted by me. The cardiac monitor was ordered secondary to the patient's history of chest pain and to monitor for dysrhythmia and/or tachycardia.    PE:   Gen: sitting comfortably, speaking clearly, appears in no acute distress   ENT: Mucous membranes moist, posterior pharynx clear, uvula midline, nares patent bilaterally   Neck: Supple, FROM  Pulmonary: Lungs are clear to auscultation bilaterally. No tachypnea  CV:  RRR, no murmur appreciated, pulses 2+ in both upper and lower extremities, no reproducible tenderness to the chest wall or reproducibility of symptoms with palpation  Abdomen: soft, NT/ND; no rebound/guarding  : no CVA or suprapubic tenderness   Neuro: A&Ox4 (person, place, time, situation), speech fluent, gait steady, no focal deficits appreciated  Skin: No rash or lesions.  No pallor or jaundice.  No cyanosis.  Warm and dry.     DIAGNOSTIC STUDIES / PROCEDURES    LABS  Results for orders placed or performed during the hospital encounter of 11/13/23   CBC with Differential   Result Value Ref Range    WBC 8.0 4.8 - 10.8 K/uL    RBC 4.80 4.70 - 6.10 M/uL    Hemoglobin 14.7 14.0 - 18.0 g/dL    Hematocrit 42.4 42.0 - 52.0 %    MCV 88.3 81.4 - 97.8 fL    MCH 30.6 27.0 - 33.0 pg    MCHC 34.7 32.3 - 36.5 g/dL    RDW 39.1 35.9 - 50.0 fL     Platelet Count 208 164 - 446 K/uL    MPV 9.6 9.0 - 12.9 fL    Neutrophils-Polys 71.00 44.00 - 72.00 %    Lymphocytes 18.90 (L) 22.00 - 41.00 %    Monocytes 7.80 0.00 - 13.40 %    Eosinophils 1.60 0.00 - 6.90 %    Basophils 0.50 0.00 - 1.80 %    Immature Granulocytes 0.20 0.00 - 0.90 %    Nucleated RBC 0.00 0.00 - 0.20 /100 WBC    Neutrophils (Absolute) 5.69 1.82 - 7.42 K/uL    Lymphs (Absolute) 1.52 1.00 - 4.80 K/uL    Monos (Absolute) 0.63 0.00 - 0.85 K/uL    Eos (Absolute) 0.13 0.00 - 0.51 K/uL    Baso (Absolute) 0.04 0.00 - 0.12 K/uL    Immature Granulocytes (abs) 0.02 0.00 - 0.11 K/uL    NRBC (Absolute) 0.00 K/uL   Complete Metabolic Panel (CMP)   Result Value Ref Range    Sodium 140 135 - 145 mmol/L    Potassium 3.9 3.6 - 5.5 mmol/L    Chloride 104 96 - 112 mmol/L    Co2 24 20 - 33 mmol/L    Anion Gap 12.0 7.0 - 16.0    Glucose 112 (H) 65 - 99 mg/dL    Bun 24 (H) 8 - 22 mg/dL    Creatinine 1.04 0.50 - 1.40 mg/dL    Calcium 9.0 8.5 - 10.5 mg/dL    Correct Calcium 8.9 8.5 - 10.5 mg/dL    AST(SGOT) 16 12 - 45 U/L    ALT(SGPT) 10 2 - 50 U/L    Alkaline Phosphatase 60 30 - 99 U/L    Total Bilirubin 0.3 0.1 - 1.5 mg/dL    Albumin 4.1 3.2 - 4.9 g/dL    Total Protein 6.8 6.0 - 8.2 g/dL    Globulin 2.7 1.9 - 3.5 g/dL    A-G Ratio 1.5 g/dL   Troponins NOW   Result Value Ref Range    Troponin T 14 6 - 19 ng/L   ESTIMATED GFR   Result Value Ref Range    GFR (CKD-EPI) 77 >60 mL/min/1.73 m 2   TROPONIN   Result Value Ref Range    Troponin T 29 (H) 6 - 19 ng/L   TROPONIN   Result Value Ref Range    Troponin T 83 (H) 6 - 19 ng/L   aPTT   Result Value Ref Range    APTT 30.4 24.7 - 36.0 sec   Prothrombin Time   Result Value Ref Range    PT 13.0 12.0 - 14.6 sec    INR 0.97 0.87 - 1.13   Heparin Xa (Unfractionated)   Result Value Ref Range    Heparin Xa (UFH) <0.10 IU/mL   HEMOGLOBIN A1C   Result Value Ref Range    Glycohemoglobin 5.6 4.0 - 5.6 %    Est Avg Glucose 114 mg/dL   MAGNESIUM   Result Value Ref Range    Magnesium 2.0  1.5 - 2.5 mg/dL   Heparin Anti-Xa   Result Value Ref Range    Heparin Xa (UFH) 0.36 IU/mL   TROPONIN   Result Value Ref Range    Troponin T 292 (H) 6 - 19 ng/L   TROPONIN   Result Value Ref Range    Troponin T 233 (H) 6 - 19 ng/L   EKG   Result Value Ref Range    Report       [facility] Emergency [facility]    Test Date:  2023  Pt Name:    VIMAL HENDERSON               Department: ER  MRN:        [id number]                      Room:       BL 22  Gender:     Male                         Technician: [id number]  :        1950                   Requested By:ER TRIAGE PROTOCOL  Order #:    [id number]                    Reading MD: Kacie Stratton    Measurements  Intervals                                Axis  Rate:       62                           P:          40  AK:         265                          QRS:        20  QRSD:       96                           T:          10  QT:         417  QTc:        424    Interpretive Statements  Sinus rhythm  Prolonged AK interval  Borderline T wave abnormalities  Compared to ECG 2015 14:01:37  T-wave abnormality now present  Sinus bradycardia no longer present  Electronically Signed On 2023 01:06:06 PST by Kacie Stratton     EKG (NOW)   Result Value Ref Range    Report       [facility] Emergency Dept.    Test Date:  2023  Pt Name:    VIMAL HENDERSON               Department: ER  MRN:        [id number]                      Room:       BL 22  Gender:     Male                         Technician: [id number]  :        1950                   Requested By:KACIE STRATTON  Order #:    [id number]                    Reading MD:    Measurements  Intervals                                Axis  Rate:       55                           P:          40  AK:         263                          QRS:        22  QRSD:       97                           T:          3  QT:         449  QTc:        430    Interpretive Statements  Sinus  bradycardia  Prolonged WA interval  Borderline T wave abnormalities  Compared to ECG 2023 01:00:10  Sinus rhythm no longer present  T-wave abnormality still present     EKG in four (4) hours   Result Value Ref Range    Report       [facility] Emergency Dept.    Test Date:  2023  Pt Name:    VIMAL HENDERSON               Department: ER  MRN:        [id number]                      Room:       [facility]  Gender:     Male                         Technician: [id number]  :        1950                   Requested By:LUAN PERLA  Order #:    [id number]                    Reading MD:    Measurements  Intervals                                Axis  Rate:       59                           P:          21  WA:         267                          QRS:        14  QRSD:       90                           T:          3  QT:         428  QTc:        424    Interpretive Statements  Sinus bradycardia  Prolonged WA interval  Baseline wander in lead(s) V1  Compared to ECG 2023 02:48:26  T-wave abnormality no longer present     EKG STAT   Result Value Ref Range    Report       [facility]    Test Date:  2023  Pt Name:    VIMAL HENDERSON               Department: 171  MRN:        [id number]                      Room:       [facility]  Gender:     Male                         Technician: [id number]  :        1950                   Requested By:ROCKY MCGEE  Order #:    [id number]                    Reading MD:    Measurements  Intervals                                Axis  Rate:       48                           P:          24  WA:         275                          QRS:        42  QRSD:       88                           T:          4  QT:         450  QTc:        402    Interpretive Statements  Sinus bradycardia  Prolonged WA interval  Compared to ECG 2023 09:05:38  No significant changes     POCT activated clotting time device results   Result Value Ref Range    Istat Activated  Clotting Time 206 (H) 74 - 137 sec   POCT activated clotting time device results   Result Value Ref Range    Istat Activated Clotting Time 250 (H) 74 - 137 sec   POCT activated clotting time device results   Result Value Ref Range    Istat Activated Clotting Time 250 (H) 74 - 137 sec   POCT activated clotting time device results   Result Value Ref Range    Istat Activated Clotting Time 255 (H) 74 - 137 sec      All labs reviewed by me. Labs were compared to prior labs if they were available. Significant for no leukocytosis, no anemia, normal electrolytes, normal glucose, normal renal function, normal liver enzymes, normal bilirubin, troponin negative, repeat troponin doubled.  Third troponin elevated to 83    RADIOLOGY  I have independently interpreted the diagnostic imaging associated with this visit and am waiting the final reading from the radiologist.   My preliminary interpretation is a follows: No significant cardiomegaly, no widened mediastinum, no focal infiltrates  Radiologist interpretation is as follows:  DX-CHEST-PORTABLE (1 VIEW)   Final Result      No acute cardiopulmonary abnormality.      EC-ECHOCARDIOGRAM COMPLETE W/O CONT    (Results Pending)   CL-LEFT HEART CATHETERIZATION WITH POSSIBLE INTERVENTION    (Results Pending)     COURSE & MEDICAL DECISION MAKING  Nursing notes, VS, PMSFHx, labs, imaging, EKG reviewed in chart.    Heart Score: Moderate    Ddx: MI, PE, pneumonia, costochondritis, NSTEMI, STEMI, strain, sprain, fracture, dislocation, pneumothorax    MDM: 1:05 AM Rob Clifton is a 73 y.o. male who presented with acute sharp right-sided chest pain that radiates to the right arm that woke him from sleep around 11:30 PM tonight.  Patient went to bed around 10:30 PM.  He had about 2-3 alcoholic beverages which is normal for him.  Him and his wife present here, patient came in by ambulance, wife drove here.  He had a normal day, went to show together, and were watching TV this evening.  Pain  started while patient was at rest in bed, sleeping and woke him from sleep.  No prior history of cardiac disease.  Pain is still present upon arrival here.  Did take Viagra tonight.  EMS gave aspirin and transported with no STEMI activation as EKG was normal in the field and patient's vital signs were reported as normal.  Upon arrival here patient still having right-sided chest pain but no reproducibility of symptoms and otherwise has a benign physical exam is very well-appearing.  EKG done emergently shows no ischemic changes or arrhythmia and is otherwise fairly reassuring and unremarkable.  Chest x-ray my independent interpretation shows no significant wide mediastinum, cardiomegaly or focal infiltrate.  Initiated labs with CBC, CMP and troponin.  All labs reviewed by me. Labs were compared to prior labs if they were available. Significant for no leukocytosis, no anemia, normal electrolytes, normal glucose, normal renal function, normal liver enzymes, normal bilirubin, initial troponin negative.  However, patient second troponin was double the previous at 1 hour concerning for developing CAD potentially.  Repeat EKG done as well shows no ischemic changes still.  However patient's third troponin came back elevated at 83 concerning for ACS and heparinization started.  Patient will be admitted to the hospitalist team for continued monitoring and evaluation, trending troponins and cardiology evaluation.  Discussed with him and his wife at bedside and they verbalized understanding plan for admission and are amenable.  Patient critically ill but stable at time of admission.      CRITICAL CARE TIME 33 minutes: Acute NSTEMI requiring admission and cardiology evaluation for potential catheterization and heparinization bolus and drip here in the ED  There was a very real possibility of deterioration of the patient's condition.  This patient required the highest level of care.  I provided critical care services which  included: review of the medical record, treatment orders, ordering and reviewing test results, frequent reevaluation of the patient's condition and response to treatment, as well as discussing the case with appropriate personnel and various consultants. The critical care time associated with the care of this patient is exclusive of any procedures or specific interventions.    ADDITIONAL PROBLEM LIST AND DISPOSITION    I have discussed management of the patient with the following physicians and JOANNE's: None    Discussion of management with other Q or appropriate source(s): None     Escalation of care considered, and ultimately not performed:acute inpatient care management, however at this time, the patient is most appropriate for outpatient management    Barriers to care at this time, including but not limited to:  None .     Decision tools and prescription drugs considered including, but not limited to: HEART Score moderate .    FINAL IMPRESSION  1. NSTEMI (non-ST elevated myocardial infarction) (HCC) Acute   2. Chest pain, unspecified type Acute      The note accurately reflects work and decisions made by me.  Percy Wright  11/13/2023  1:05 AM

## 2023-11-13 NOTE — PROCEDURES
Cardiac Catheterization Procedure Note    DATE: 11/13/2023    : Mehran Hernandez MD    PROCEDURES PERFORMED:  Left heart catheterization  Coronary angiography  Intravascular ultrasound of the left circumflex coronary  Percutaneous coronary intervention to the ostial to the subtotally occluded proximal first obtuse marginal branch  Moderate conscious sedation    INDICATIONS:  The patient is a 73-year-old gentleman referred for cardiac catheterization to evaluate chest pain with an elevated troponin.    CONSENT:  The complete alternatives, risks, and benefits of the procedure were explained to the patient. Signed informed consent was obtained and placed in the chart prior to the procedure.  A timeout was performed prior to beginning the procedure.    MEDICATIONS:  Lidocaine  Fentanyl  Midazolam  Nitroglycerin  Verapamil  Heparin  Prasugrel    MODERATE CONSCIOUS SEDATION:  I personally supervised the administration of moderate conscious sedation by the nursing staff for 112 minutes.  Sedation start time: 11:34 AM  Sedation end time: 1:26 PM    CONTRAST: Omnipaque 142 cc    ACCESS: 6-Azeri Glidesheath in the right radial artery.    ESTIMATED BLOOD LOSS: 30 cc    COMPLICATIONS: None    PROCEDURE IN DETAIL:  The patient was brought to the cardiac catheterization laboratory in the fasting state.  The skin over the right wrist was prepped and draped in the usual sterile fashion. Lidocaine infiltration was used to anesthetize the tissue over the right radial artery.  Using the micropuncture technique, a 6-Azeri Glidesheath was inserted in the right radial artery.  A 5-Azeri Renato diagnostic catheter was then advanced over a standard J-wire into the left ventricular cavity where it was gently aspirated, flushed, and then withdrawn across the aortic valve with sequential pressures measured.  This catheter was then used to engage the ostium of the right coronary artery and cineangiograms were obtained in multiple  projections for complete evaluation of the right coronary system.  This catheter was unable to engage the ostium of the left main coronary artery and it was exchanged for a 6-Mozambican JL-4 diagnostic catheter.  This catheter was then used to engage the ostium of the left main coronary artery and and cineangiograms were obtained in multiple projections for complete evaluation of the left coronary system.  Following completion of coronary angiography, we proceeded with intervention on the subtotally occluded first obtuse marginal branch, which was thought to be the culprit lesion.    The Renato catheter was exchanged over a J-wire for a 6-Mozambican EBU-4.0 guide catheter.  This catheter was used to re-engage the ostium of the left main coronary artery.  A Run-through wire was then advanced into the distal aspect of the AV groove circumflex.  A Prowater wire was then advanced into the proximal aspect of the OM1 and then a 6-Mozambican guide extension catheter was brought down over the wires for support and better visualization.  The Prowater wire could not be advanced across the lesion in the OM1, even with support from a noncompliant balloon, which appeared to be fibrotic and chronic in nature.  We attempted to trap out the Prowater wire to advance a Corsair microcatheter, however, the Corsair could not be advanced distally.  The Prowater wire was then removed and advanced through a Mamba Black microcatheter.  Attempts were made to cross the lesion with the Prowater wire, a Fielder FC wire, and a Judo 1 wire unsuccessfully.  Ultimately a Caitlin Next 2 wire was able to be advanced across across the lesion and was then exchanged for a Prowater wire.  We again attempted to trap out the microcatheter unsuccessfully.  However, cineangiography demonstrated that the wire was clearly in the true lumen of the vessel, therefore, the microcatheter was advanced forward into the OM1 and the Prowater wire was exchanged for a BMW wire, which  was then removed using a Doc wire.    After successfully crossing the lesion, the area was predilated with a 2.0 x 12 mm compliant balloon at a maximum pressure of 8 luciano.  Following initial predilation, an Consilium Software IVUS catheter was then advanced over the guidewire and IVUS was performed.  The distal reference vessel was relatively healthy with a true vessel diameter of approximately 2.0 mm.  The proximal aspect of the vessel all the way to the ostium had extensive mild to moderate plaque with no true healthy landing zone and a true vessel diameter at the ostium of approximately 3.0 mm.  Following IVUS, a 2.0 x 30 mm Thomson drug-eluting stent was advanced over the guidewire and was deployed across the lesion at a maximum pressure of 16 luciano.  After deployment, the stent was postdilated with a 2.5 x 15 mm noncompliant balloon at a maximum pressure of 18 luciano proximally.  Final cineangiograms were then obtained in orthogonal views, which demonstrated excellent stent expansion and apposition with no evidence of wire perforation, thrombus or dissection. At the completion of the case, all wires, catheters, and sheaths were removed. A TR band was placed using the patent hemostasis technique.    HEMODYNAMICS:   Aortic pressure: 106/67 mmHg  Pre A-wave pressure: 12 mmHg  No significant aortic gradient on pullback    LEFT VENTRICULOGRAPHY:  Estimated left ventricular ejection fraction 40-45%.  Anterolateral hypokinesis.    CORONARY ANGIOGRAPHY:  The left main coronary artery is patent and bifurcates into the left anterior descending and left circumflex coronary arteries.  The left anterior descending coronary artery is a large, transapical vessel with proximal 70% disease, sequential mid 50% stenoses, and distal luminal irregularities.  It supplies a moderate first diagonal branch with proximal 95% disease and a large second diagonal branch with ostial to proximal 70% disease.  The left circumflex coronary artery is a moderate,  nondominant vessel with a 99% subtotal occlusion of the proximal first obtuse marginal branch and luminal irregularities in the AV groove vessel, which supplies two small distal obtuse marginal branches.  The right coronary artery is a moderate, dominant vessel that supplies a moderate posterior descending coronary and a large posterolateral branch and has luminal irregularities in the distribution.    INTRAVASCULAR ULTRASOUND:  See IVUS findings and procedure details above.    PERCUTANEOUS CORONARY INTERVENTION:  Lesion location: Proximal first obtuse marginal branch subtotal occlusion  Lesion type: C  Lesion length: 15 mm  Pre-intervention TATY flow: 1  Post-intervention TATY flow: 3  Pre-intervention stenosis: 99%  Post-intervention stenosis: 0%  Stent: 2.0 x 30 mm [place] drug-eluting stent    IMPRESSION:  Severe obstructive two-vessel coronary artery disease.  Successful complex percutaneous coronary intervention to the subtotally occluded proximal first obtuse marginal branch.  Mildly reduced left ventricular systolic function.  Normal left heart filling pressures.    RECOMMENDATIONS:  Return to floor with continued care per primary service.  TR band release per protocol.  Post procedure fluids for renal protection.  Dual antiplatelet therapy with aspirin and prasugrel for at least 6-12 months if tolerated.  Discussion with patient regarding risks and benefits of staged percutaneous coronary invention to the left anterior descending coronary artery and/or diagonal branches as an inpatient or outpatient.  Referral to cardiac rehabilitation on discharge.    NOTIFICATION:  The patient's family was called and notified of the results of his cardiac catheterization.

## 2023-11-13 NOTE — ED NOTES
Pharmacy Medication Reconciliation      ~Medication reconciliation updated and complete per patient at bedside  ~Allergies have been verified and updated   ~No oral ABX within the last 30 days  ~Patient home pharmacy :  [facility]      ~Anticoagulants (rivaroxaban, apixaban, edoxaban, dabigatran, warfarin, enoxaparin) taken in the last 14 days? NO

## 2023-11-13 NOTE — ED NOTES
Assist RN: ARIANA confirmed receipt of critical troponin @ 0803. EKG completed and reviewed by ERP.
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
## 2023-11-13 NOTE — ASSESSMENT & PLAN NOTE
Patient reports taking sildenafil yesterday morning.  Discussed with pharmacy suggested to hold nitroglycerin for 48 hours.

## 2023-11-13 NOTE — PROGRESS NOTES
4 Eyes Skin Assessment Completed by ROMANA Leon and ROMANA Napoles.    Head WDL  Ears WDL  Nose WDL  Mouth WDL  Neck WDL  Breast/Chest WDL  Shoulder Blades WDL  Spine WDL  (R) Arm/Elbow/Hand WDL  (L) Arm/Elbow/Hand WDL  Abdomen WDL  Groin WDL  Scrotum/Coccyx/Buttocks WDL  (R) Leg WDL  (L) Leg WDL  (R) Heel/Foot/Toe WDL  (L) Heel/Foot/Toe WDL      -Skin is intact, no areas of concern.    Devices In Places Tele Box, Blood Pressure Cuff, Pulse Ox, and Nasal Cannula      Interventions In Place Pressure Redistribution Mattress    Possible Skin Injury No    Pictures Uploaded Into Epic N/A  Wound Consult Placed N/A  RN Wound Prevention Protocol Ordered No

## 2023-11-13 NOTE — PROGRESS NOTES
Patient up to unit from cath lab. Patient is A&Ox4, VSS, no signs of distress. Tele monitor placed and verified by monitor room. TR band to right radial, no signs of bleeding or hematoma. All questions and concerns answered, call light in reach, plan of care ongoing.

## 2023-11-13 NOTE — ED NOTES
Pt transferred onto hospital bed for comfort. Denying CP at this time. Pt resting with even chest rise and fall, reports no needs at this time, call light available and in reach.

## 2023-11-13 NOTE — ED NOTES
Pt provided urinal. Pt resting with even chest rise and fall, reports no needs at this time, call light available and in reach.

## 2023-11-13 NOTE — ED NOTES
Bedside report from ROMANA Singleton    ABCs intact. A+Ox4. Skin PWD. Pt denies CP/SOB at this time. Vitals/tele on the monitor.    Pt does not require O2 at this time. Fall precautions in place due to age, pt is stable upon ambulation.
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
## 2023-11-13 NOTE — ED NOTES
Bedside report to ROMANA Barajas    Heparin handoff completed at initial rate of 12 units/kg/hr    Pt does not require O2 at this time. Fall precautions in place.

## 2023-11-13 NOTE — ED NOTES
Repeat EKG performed. Pt resting with even chest rise and fall, reports no needs at this time, call light available and in reach.

## 2023-11-13 NOTE — ED NOTES
Bedside report received from off going RN [place], assumed care of patient.  POC discussed with patient. Call light within reach, all needs addressed at this time.       Fall risk interventions in place: Give patient urinal if applicable, Keep floor surfaces clean and dry, and Accompanied to restroom (all applicable per [place] Fall risk assessment)   Continuous monitoring: Cardiac Leads, Pulse Ox, or Blood Pressure  IVF/IV medications: Not Applicable   Oxygen: Room Air  Bedside sitter: Not Applicable   Isolation: Not Applicable

## 2023-11-13 NOTE — CARE PLAN
The patient is Stable - Low risk of patient condition declining or worsening    Shift Goals  Clinical Goals: Monitor for chest pain      Problem: Knowledge Deficit - Standard  Goal: Patient and family/care givers will demonstrate understanding of plan of care, disease process/condition, diagnostic tests and medications  Outcome: Progressing

## 2023-11-13 NOTE — ED NOTES
Assist RN: Tech from Lab called with critical result of troponin of 292 at 0854. Critical lab result read back to .   ARIANA Redman notified of critical lab result at 0855.

## 2023-11-13 NOTE — CONSULTS
Reason of Consult: Non-STEMI    Consulting Physician: Dr. Leong    HPI:  73-year-old male with a history of peripheral arterial disease characterized by mild nonobstructive carotid atherosclerosis and hyperlipidemia on therapy presents with sudden onset left-sided chest and shoulder discomfort early  the morning of presentation.  EKG was nonspecific troponins have ranged from normal to 200 without recurrence of chest discomfort after initiation of medical therapy.  Denies smoking drinks 2 glasses of wine per week and denies drug use.  No family history of Gaucher CAD.    Past Medical History:   Diagnosis Date    Achilles tendon infection 04/2020    surgically resolved     Arthritis     back     Heart burn     Hernia, abdominal     High cholesterol     Kidney stones     currently being monitored, 8/2021       Social History     Socioeconomic History    Marital status:      Spouse name: Not on file    Number of children: Not on file    Years of education: Not on file    Highest education level: Bachelor's degree (e.g., BA, AB, BS)   Occupational History    Not on file   Tobacco Use    Smoking status: Never    Smokeless tobacco: Never   Vaping Use    Vaping Use: Some days    Substances: THC    Devices: Disposable   Substance and Sexual Activity    Alcohol use: Yes     Alcohol/week: 8.4 oz     Types: 14 Glasses of wine per week     Comment: 1-2 daily    Drug use: Yes     Types: Marijuana, Oral, Inhaled     Comment: marijuana occ., last use 2 days ago    Sexual activity: Yes     Partners: Female     Birth control/protection: Post-Menopausal, None     Comment: .    Other Topics Concern    Not on file   Social History Narrative    Not on file     Social Determinants of Health     Financial Resource Strain: Low Risk  (11/21/2022)    Overall Financial Resource Strain (CARDIA)     Difficulty of Paying Living Expenses: Not hard at all   Food Insecurity: No Food Insecurity (11/21/2022)    Hunger Vital Sign      Worried About Running Out of Food in the Last Year: Never true     Ran Out of Food in the Last Year: Never true   Transportation Needs: No Transportation Needs (11/21/2022)    PRAPARE - Transportation     Lack of Transportation (Medical): No     Lack of Transportation (Non-Medical): No   Physical Activity: Sufficiently Active (11/21/2022)    Exercise Vital Sign     Days of Exercise per Week: 3 days     Minutes of Exercise per Session: 50 min   Stress: No Stress Concern Present (11/21/2022)    [employer] [place] [employer] - Occupational Stress Questionnaire     Feeling of Stress : Only a little   Social Connections: Moderately Isolated (11/21/2022)    Social Connection and Isolation Panel [NHANES]     Frequency of Communication with Friends and Family: Twice a week     Frequency of Social Gatherings with Friends and Family: Once a week     Attends Buddhism Services: Never     Active Member of Clubs or Organizations: No     Attends Club or Organization Meetings: Patient refused     Marital Status:    Intimate Partner Violence: Not on file   Housing Stability: Low Risk  (11/21/2022)    Housing Stability Vital Sign     Unable to Pay for Housing in the Last Year: No     Number of Places Lived in the Last Year: 1     Unstable Housing in the Last Year: No       No current facility-administered medications on file prior to encounter.     Current Outpatient Medications on File Prior to Encounter   Medication Sig Dispense Refill    Doxylamine Succinate, Sleep, (SLEEP AID PO) Take 1 Dose by mouth at bedtime as needed (SLEEP).      tamsulosin (FLOMAX) 0.4 MG capsule Take 1 Capsule by mouth 1/2 hour after breakfast. 90 Capsule 1    meloxicam (MOBIC) 15 MG tablet Take 1 Tablet by mouth every day. 90 Tablet 1    sildenafil citrate (VIAGRA) 100 MG tablet Take 1 Tablet by mouth 1 time a day as needed for Erectile Dysfunction for up to 100 days. 100 Tablet 0    rosuvastatin (CRESTOR) 10 MG Tab Take 1 Tablet by  "mouth every evening. 90 Tablet 2    Misc Natural Products (LUTEIN 20 PO) Take 20 mg by mouth every day.         Current Facility-Administered Medications   Medication Dose Frequency Provider Last Rate Last Admin    heparin infusion 25,000 units in 500 mL 0.45% NACL  0-30 Units/kg/hr Continuous Percy Wright 22.3 mL/hr at 11/13/23 0914 12 Units/kg/hr at 11/13/23 0914    heparin injection 2,000 Units  2,000 Units PRN Percy Wright        tamsulosin (Flomax) capsule 0.4 mg  0.4 mg AFTER BREAKFAST Stewart Leong M.D.   0.4 mg at 11/13/23 0927    [START ON 11/14/2023] aspirin EC tablet 81 mg  81 mg DAILY Stewart Leong M.D.        metoprolol tartrate (Lopressor) tablet 12.5 mg  12.5 mg BID Stewart Leong M.D.        morphine 4 MG/ML injection 1 mg  1 mg Q4HRS PRN Stewart Leong M.D.        rosuvastatin (Crestor) tablet 20 mg  20 mg Q EVENING Stewart Leong M.D.       Last reviewed on 11/13/2023  3:09 AM by Ca Sanchez, PhT     Bactrim ds    Family History   Problem Relation Age of Onset    No Known Problems Mother     No Known Problems Father     Other Maternal Grandmother         Possibly TB    No Known Problems Maternal Grandfather     No Known Problems Paternal Grandmother     No Known Problems Paternal Grandfather     Heart Disease Neg Hx     Cancer Neg Hx     Diabetes Neg Hx     Stroke Neg Hx        ROS: As per HPI all other systems reviewed and negative     Physical Exam   Blood pressure 136/67, pulse 62, temperature 36.9 °C (98.4 °F), temperature source Temporal, resp. rate 12, height 1.803 m (5' 11\"), weight 93 kg (205 lb 0.4 oz), SpO2 96 %.    Constitutional:  Appears well-developed.   HENT: Normocephalic and atraumatic. No scleral icterus.   Neck: No JVD present.   Cardiovascular: Normal rate.   Pulmonary/Chest: Normal chest rise  Abdominal: S/NT/ND BS+   Musculoskeletal:  Pulses present. No atrophy. Strength normal.  Extremities: Exhibits no edema. No clubbing or cyanosis.   Skin: Skin is " warm and dry.   Neuro: Non-focal, CN 2-12 intact grossly    No intake or output data in the 24 hours ending 11/13/23 1023    Recent Labs     11/13/23  0105   WBC 8.0   RBC 4.80   HEMOGLOBIN 14.7   HEMATOCRIT 42.4   MCV 88.3   MCH 30.6   MCHC 34.7   RDW 39.1   PLATELETCT 208   MPV 9.6     Recent Labs     11/13/23  0105   SODIUM 140   POTASSIUM 3.9   CHLORIDE 104   CO2 24   GLUCOSE 112*   BUN 24*   CREATININE 1.04   CALCIUM 9.0     Recent Labs     11/13/23  0302   APTT 30.4   INR 0.97                     Imaging reviewed    Impressions:  1.  Non-STEMI  2.  Carotid atherosclerosis  3.  Hyperlipidemia    Recommendations:  Coronary angiography with possible PCI  Continue n.p.o. and heparin GTT  Aspirin, statin, beta-blocker as tolerated    I have discussed the risks and benefits of cardiac catheterization as well as the procedure itself, rationale and appropriateness in detail with the patient today. Complications including but not limited to death, stroke, MI, urgent bypass surgery, contrast nephropathy, vascular complications, bleeding and infection were explained to the patient. The potential outcomes associated with the procedure (possible PCI, possible CABG, possible medical Rx only) were also discussed at length. The patient agrees to proceed.    Mr. Clifton's care is highly complex due to high risk diagnosis with either severe exacerbation, progression, or side effects of treatment. We specifically discussed the need for high risk medication requiring at least quarterly testing and/or made a decision on elective/emergent major surgery with identified patient or procedure risk factors specific to Mr. Clifton. I have personally spent extra time in discussion about these facts with Mr. Clifton and reviewed and or ordered at least 3 tests, documents or other physician/JOANNE reports available including labs, imaging and EKGs as appropriate separate from today's encounter.  I have reviewed images with Mr. Clifton and  personally interpreted on this encounter day the referenced EKG, echocardiogram, stress tests, CT scan, cardiac catheterization or other cardiac imaging and my personal interpretation is what is specifically stated in this note.    Thank you for this interesting consultation. It was my pleasure to see Rob Clifton today.    Anil Saldana MD, FACC, [facility]  Division of Interventional Cardiology  [facility]

## 2023-11-13 NOTE — ASSESSMENT & PLAN NOTE
Telemetry monitoring  Given rising troponin, we have initiated heparin bolus with continuous heparin infusion.   Serial troponin   -Loaded with aspirin, continue with aspirin 81 mg daily   -Metoprolol 12.5 mg BID with holding parameters SBP<100, HR<60  -increase crestor from 10 mg to 20 mg. Repeat lipid panel and a1c  -Echo 2D  -Will keep NPO for likely coronary angiogram once cardiology evaluates in am   -Hold nitroglycerin s/L given sildenafil use within last 24 hours, discussed with pharmacy to hold for 48 hours to allow washout of drug.

## 2023-11-13 NOTE — ED NOTES
PT updated and medicated per MAR. Pt resting with even chest rise and fall, reports no needs at this time, call light available and in reach.
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
## 2023-11-14 VITALS
HEART RATE: 63 BPM | SYSTOLIC BLOOD PRESSURE: 143 MMHG | TEMPERATURE: 97.3 F | WEIGHT: 204.15 LBS | RESPIRATION RATE: 17 BRPM | HEIGHT: 71 IN | DIASTOLIC BLOOD PRESSURE: 81 MMHG | BODY MASS INDEX: 28.58 KG/M2 | OXYGEN SATURATION: 96 %

## 2023-11-14 DIAGNOSIS — I25.9 ISCHEMIC HEART DISEASE DUE TO CORONARY ARTERY OBSTRUCTION (HCC): ICD-10-CM

## 2023-11-14 DIAGNOSIS — I21.4 NSTEMI (NON-ST ELEVATED MYOCARDIAL INFARCTION) (HCC): ICD-10-CM

## 2023-11-14 DIAGNOSIS — I24.0 ISCHEMIC HEART DISEASE DUE TO CORONARY ARTERY OBSTRUCTION (HCC): ICD-10-CM

## 2023-11-14 LAB
ANION GAP SERPL CALC-SCNC: 8 MMOL/L (ref 7–16)
BUN SERPL-MCNC: 19 MG/DL (ref 8–22)
CALCIUM SERPL-MCNC: 8.8 MG/DL (ref 8.5–10.5)
CHLORIDE SERPL-SCNC: 107 MMOL/L (ref 96–112)
CHOLEST SERPL-MCNC: 102 MG/DL (ref 100–199)
CO2 SERPL-SCNC: 24 MMOL/L (ref 20–33)
CREAT SERPL-MCNC: 1.21 MG/DL (ref 0.5–1.4)
EKG IMPRESSION: NORMAL
ERYTHROCYTE [DISTWIDTH] IN BLOOD BY AUTOMATED COUNT: 39.8 FL (ref 35.9–50)
GFR SERPLBLD CREATININE-BSD FMLA CKD-EPI: 64 ML/MIN/1.73 M 2
GLUCOSE SERPL-MCNC: 89 MG/DL (ref 65–99)
HCT VFR BLD AUTO: 41.1 % (ref 42–52)
HDLC SERPL-MCNC: 42 MG/DL
HGB BLD-MCNC: 13.6 G/DL (ref 14–18)
LDLC SERPL CALC-MCNC: 40 MG/DL
MCH RBC QN AUTO: 29.9 PG (ref 27–33)
MCHC RBC AUTO-ENTMCNC: 33.1 G/DL (ref 32.3–36.5)
MCV RBC AUTO: 90.3 FL (ref 81.4–97.8)
PLATELET # BLD AUTO: 212 K/UL (ref 164–446)
PMV BLD AUTO: 9.8 FL (ref 9–12.9)
POTASSIUM SERPL-SCNC: 4.5 MMOL/L (ref 3.6–5.5)
RBC # BLD AUTO: 4.55 M/UL (ref 4.7–6.1)
SODIUM SERPL-SCNC: 139 MMOL/L (ref 135–145)
TRIGL SERPL-MCNC: 102 MG/DL (ref 0–149)
TROPONIN T SERPL-MCNC: 157 NG/L (ref 6–19)
TROPONIN T SERPL-MCNC: 180 NG/L (ref 6–19)
WBC # BLD AUTO: 7.4 K/UL (ref 4.8–10.8)

## 2023-11-14 PROCEDURE — 80061 LIPID PANEL: CPT

## 2023-11-14 PROCEDURE — 80048 BASIC METABOLIC PNL TOTAL CA: CPT

## 2023-11-14 PROCEDURE — A9270 NON-COVERED ITEM OR SERVICE: HCPCS | Performed by: INTERNAL MEDICINE

## 2023-11-14 PROCEDURE — 93010 ELECTROCARDIOGRAM REPORT: CPT | Performed by: INTERNAL MEDICINE

## 2023-11-14 PROCEDURE — 85027 COMPLETE CBC AUTOMATED: CPT

## 2023-11-14 PROCEDURE — A9270 NON-COVERED ITEM OR SERVICE: HCPCS | Performed by: STUDENT IN AN ORGANIZED HEALTH CARE EDUCATION/TRAINING PROGRAM

## 2023-11-14 PROCEDURE — 99232 SBSQ HOSP IP/OBS MODERATE 35: CPT | Performed by: INTERNAL MEDICINE

## 2023-11-14 PROCEDURE — 700102 HCHG RX REV CODE 250 W/ 637 OVERRIDE(OP): Performed by: STUDENT IN AN ORGANIZED HEALTH CARE EDUCATION/TRAINING PROGRAM

## 2023-11-14 PROCEDURE — 700102 HCHG RX REV CODE 250 W/ 637 OVERRIDE(OP): Performed by: INTERNAL MEDICINE

## 2023-11-14 PROCEDURE — 84484 ASSAY OF TROPONIN QUANT: CPT | Mod: 91

## 2023-11-14 PROCEDURE — 99239 HOSP IP/OBS DSCHRG MGMT >30: CPT | Performed by: HOSPITALIST

## 2023-11-14 PROCEDURE — 36415 COLL VENOUS BLD VENIPUNCTURE: CPT

## 2023-11-14 RX ORDER — ROSUVASTATIN CALCIUM 20 MG/1
20 TABLET, COATED ORAL EVERY EVENING
Qty: 30 TABLET | Refills: 2 | Status: SHIPPED | OUTPATIENT
Start: 2023-11-14 | End: 2024-02-05 | Stop reason: SDUPTHER

## 2023-11-14 RX ORDER — ASPIRIN 81 MG/1
81 TABLET ORAL DAILY
Qty: 100 TABLET | Refills: 2 | Status: SHIPPED | OUTPATIENT
Start: 2023-11-15 | End: 2024-02-05 | Stop reason: SDUPTHER

## 2023-11-14 RX ORDER — PRASUGREL 10 MG/1
10 TABLET, FILM COATED ORAL DAILY
Qty: 30 TABLET | Refills: 2 | Status: SHIPPED | OUTPATIENT
Start: 2023-11-15 | End: 2024-02-05 | Stop reason: SDUPTHER

## 2023-11-14 RX ADMIN — TAMSULOSIN HYDROCHLORIDE 0.4 MG: 0.4 CAPSULE ORAL at 07:53

## 2023-11-14 RX ADMIN — PRASUGREL 10 MG: 10 TABLET, FILM COATED ORAL at 05:07

## 2023-11-14 RX ADMIN — ASPIRIN 81 MG: 81 TABLET, COATED ORAL at 05:07

## 2023-11-14 ASSESSMENT — ENCOUNTER SYMPTOMS
NAUSEA: 0
DIZZINESS: 0
ORTHOPNEA: 0
PND: 0
SHORTNESS OF BREATH: 0
LOSS OF CONSCIOUSNESS: 0
PALPITATIONS: 0
COUGH: 0

## 2023-11-14 ASSESSMENT — FIBROSIS 4 INDEX: FIB4 SCORE: 1.69

## 2023-11-14 NOTE — DIETARY
Nutrition Services: Cardiac Diet Education Consult   Day 1 of admit.  Rob Clifton is a 73 y.o. male with admitting DX of NSTEMI (non-ST elevated myocardial infarction) ([facility]) [I21.4]    RD able to visit pt at bedside to provide nutrition education. RD discussed adequate intake of fiber, saturated fat, essential fatty acids, and sodium. RD provided handout reinforcing topics discussed. RD also able to provide information pertaining to outpatient Cardiac Rehab program. Pt demonstrated readiness and evidence of learning. RD able to answer all questions to patient's satisfaction.     No other education needs identified at this time. Consider referral to outpatient nutrition services for continuation of education as indicated or per pt preferences.     Please re-consult RD as indicated.

## 2023-11-14 NOTE — DISCHARGE INSTRUCTIONS
Diagnosis:  Acute Coronary Syndrome (ACS) is a diagnosis that encompasses cardiac-related chest pain and heart attack. ACS occurs when the blood flow to the heart muscle is severely reduced or cut off completely due to a slow process called atherosclerosis.  Atherosclerosis is a disease in which the coronary arteries become narrow from a buildup of fat, cholesterol, and other substances that combine to form plaque. If the plaque breaks, a blood clot will form and block the blood flow to the heart muscle. This lack of blood flow can cause damage or death to the heart muscle which is called a heart attack or Myocardial Infarction (MI). There are two different types of MIs:  ST Elevation Myocardial Infarction or STEMI (the most severe type of heart attack) and Non-ST Elevation Myocardial Infarction or NSTEMI.    Treatment Plan:  Cardiac Diet  - Low fat, low salt, low cholesterol   Cardiac Rehab  - Your doctor has ordered you a referral to [facility].  Call [phone] to schedule an appointment.  Attend my follow-up appointment with my Cardiologist.  Take my medications as prescribed by my doctor  Exercise daily  Lower my bad cholesterol and raise my good cholesterol and lower my blood pressure    Medications:  Certain medications are used to treat ACS.  Remember to always take medications as prescribed and never stop talking medications unless told by your doctor.    You have been prescribed the following medicatons:    Aspirin - Aspirin is used as a blood thinning medication and you will require this medication indefinitely.  Anti-platelet/blood thinner - Your Anti-platelet/Blood thinning medication is called Prasugrel, and is used in combination with aspirin to prevent clots from forming in your heart and/or around your stent.  Your doctor will determine how long you need to be on this medicine.  Beta-Blocker - Beta-Blocker metoprolol is used to lower blood pressure and heart rate, and/or helps your heart heal after  a heart attack.  Statin - Statin rosuvastatin is used to lower cholesterol.

## 2023-11-14 NOTE — PROGRESS NOTES
Rob Clifton has been provided discharge instructions, to include follow up care, home medications, and activity/diet reviewed. Understanding verbalized. Pt ride present. Pt out of DCL at this time with personal belongings.

## 2023-11-14 NOTE — PROGRESS NOTES
Bedside report received and patient care assumed. Pt is resting in bed, A&O4, with no complaints of pain, and is on RA. Tele box on. All fall precautions are in place, belongings at bedside table.  Pt was updated on POC, no questions or concerns. Pt educated on use of call light for assistance.

## 2023-11-14 NOTE — CARE PLAN
The patient is Stable - Low risk of patient condition declining or worsening    Shift Goals  Clinical Goals: Monitor chest pain & TR band  Patient Goals: comfort  Family Goals: carmel    Progress made toward(s) clinical / shift goals:    Problem: Knowledge Deficit - Standard  Goal: Patient and family/care givers will demonstrate understanding of plan of care, disease process/condition, diagnostic tests and medications  Description: Target End Date:  1-3 days or as soon as patient condition allows    Document in Patient Education    1.  Patient and family/caregiver oriented to unit, equipment, visitation policy and means for communicating concern  2.  Complete/review Learning Assessment  3.  Assess knowledge level of disease process/condition, treatment plan, diagnostic tests and medications  4.  Explain disease process/condition, treatment plan, diagnostic tests and medications  Outcome: Progressing     Patient has expressed no chest pain and right radial site is clean, dry, and intact post TR band removal.     Patient is not progressing towards the following goals:

## 2023-11-14 NOTE — PROGRESS NOTES
Cardiology Progress Note    Name:   Rob Clifton   YOB: 1950  Age:   73 y.o.  male   MRN:   [id number]    Attending Cardiologist: Dr Saldana      Chief Complaint: Chest Pain (Pt BIBA after sharp CP woke pt from sleep. Denies cardiac hx, denies SOB/N/V/dizziness)       History of Present Illness  72yo male with mild nonobstructive carotid artery disease, hyperlipidemia presents with acute chest pain, diagnosed with NSTEMI referred for coronary angiogram 11/13/23. Coronary angiogram shows obstructive CAD in 2 vessels with subtotal occlusion in proximal 1st OM s/p successful NATY with 2.0 x 30 mm [place] drug-eluting stent. Residual disease in left anterior descending artery and first diagonal branch.   His echocardiogram shows normal LV function, no valve abnormalities.     Interim Events   No events overnight. Feels great. Has been up and walking to bathroom, around room without angina symptoms.      Review of Systems   Respiratory:  Negative for cough and shortness of breath.    Cardiovascular:  Negative for chest pain, palpitations, orthopnea, leg swelling and PND.   Gastrointestinal:  Negative for nausea.   Neurological:  Negative for dizziness and loss of consciousness.       Medical History  Past Medical History:   Diagnosis Date    Achilles tendon infection 04/2020    surgically resolved     Arthritis     back     Heart burn     Hernia, abdominal     High cholesterol     Kidney stones     currently being monitored, 8/2021         Family History   Problem Relation Age of Onset    No Known Problems Mother     No Known Problems Father     Other Maternal Grandmother         Possibly TB    No Known Problems Maternal Grandfather     No Known Problems Paternal Grandmother     No Known Problems Paternal Grandfather     Heart Disease Neg Hx     Cancer Neg Hx     Diabetes Neg Hx     Stroke Neg Hx          Social History     Tobacco Use    Smoking status: Never    Smokeless tobacco: Never  "  Vaping Use    Vaping Use: Some days    Substances: THC    Devices: Disposable   Substance Use Topics    Alcohol use: Yes     Alcohol/week: 8.4 oz     Types: 14 Glasses of wine per week     Comment: 1-2 daily    Drug use: Yes     Types: Marijuana, Oral, Inhaled     Comment: marijuana occ., last use 2 days ago         Allergies   Allergen Reactions    Bactrim Ds Unspecified     Patient states jaundice         Medications   Scheduled Medications   Medication Dose Frequency    tamsulosin  0.4 mg AFTER BREAKFAST    aspirin  81 mg DAILY    metoprolol tartrate  12.5 mg BID    rosuvastatin  20 mg Q EVENING    prasugrel  10 mg DAILY           Physical Exam  /88   Pulse (!) 57   Temp 36.7 °C (98.1 °F) (Temporal)   Resp 17   Ht 1.803 m (5' 11\")   Wt 92.6 kg (204 lb 2.3 oz)   SpO2 97%     Physical Exam  Vitals reviewed.   Constitutional:       Appearance: Normal appearance.   HENT:      Head: Normocephalic and atraumatic.   Cardiovascular:      Rate and Rhythm: Normal rate and regular rhythm.      Heart sounds: No murmur heard.  Pulmonary:      Effort: Pulmonary effort is normal. No respiratory distress.      Breath sounds: No wheezing or rales.   Abdominal:      General: There is no distension.   Musculoskeletal:      Right lower leg: No edema.      Left lower leg: No edema.      Comments: Right wrist is soft without hematoma or active bleeding. Distal radial pulse is 2+. Normal range of motion and sensation of right hand.      Neurological:      Mental Status: He is alert.           Labs (personally reviewed):     Lab Results   Component Value Date/Time    SODIUM 139 11/14/2023 02:22 AM    POTASSIUM 4.5 11/14/2023 02:22 AM    CHLORIDE 107 11/14/2023 02:22 AM    CO2 24 11/14/2023 02:22 AM    GLUCOSE 89 11/14/2023 02:22 AM    BUN 19 11/14/2023 02:22 AM    CREATININE 1.21 11/14/2023 02:22 AM     Lab Results   Component Value Date/Time    CHOLSTRLTOT 102 11/14/2023 02:22 AM    LDL 40 11/14/2023 02:22 AM    HDL 42 " "11/14/2023 02:22 AM    TRIGLYCERIDE 102 11/14/2023 02:22 AM     No results found for: \"BNPBTYPENAT\"      Cardiac Imaging and Procedures Review      Echo 11/13/23  CONCLUSIONS  Compared to the prior study on 02/28/2022 there is no significant change.  Normal left ventricular size, thickness, and systolic function.  The left ventricular ejection fraction is visually estimated to be 55%.  Normal diastolic function.  The right ventricle is normal in size and systolic function.  No significant valvular abnormalities.     [facility] 11/13/23  IMPRESSION:  Severe obstructive two-vessel coronary artery disease.  Successful complex percutaneous coronary intervention to the subtotally occluded proximal first obtuse marginal branch.  Mildly reduced left ventricular systolic function.  Normal left heart filling pressures.         Assessment and Medical Decision Making:  NSTEMI   2 vessel obstructive CAD s/p Successful complex percutaneous coronary intervention to the subtotally occluded proximal first obtuse marginal branch 11/13/23  Residual LAD/D1 disease    - DAPT: aspirin 81mg, prasugrel 10mg  - high intensity statin: rosuvastatin 20mg  - beta blocker: relatively contra-indicated due to naturally low heart rates  - Cardiac rehab: discussed and referred, enroll after staged PCI    - After long discussion with patient, wife, cardiologist , agreed on plan for staged PCI outpatient. I will arrange timing.     Future Appointments   Date Time Provider Department Center   12/7/2023  8:45 AM Jessie Chan M.D. [facility]     Please see Dr Saldana's attestation for additions and further recommendations.    Viviane Marti PA-C  [facility]    I personally spent a total of 15 minutes which includes face-to-face time and non-face-to-face time spent on preparing to see the patient, reviewing hospital notes and tests, obtaining history from the patient, performing a medically appropriate " exam, counseling and educating the patient, ordering medications/tests/procedures/referrals as clinically indicated, and documenting information in the electronic medical record.

## 2023-11-15 ENCOUNTER — PATIENT OUTREACH (OUTPATIENT)
Dept: MEDICAL GROUP | Facility: PHYSICIAN GROUP | Age: 73
End: 2023-11-15
Payer: MEDICARE
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
## 2023-11-16 NOTE — PROGRESS NOTES
Transitional Care Management  TCM Outreach Date and Time: Filed (11/15/2023 10:22 AM)    Discharge Questions  Actual Discharge Date: 11/14/23  Now that you are home, how are you feeling?: Good  Did you receive any new prescriptions?: Yes  Were you able to get them filled?: Yes  Meds to Bed or Pharmacy filled?: Pharmacy  Do you have any questions about your current medications or new medications (Review Med Rec)?: No  Do you have a follow up appointment scheduled with your PCP?: No  Was an appointment scheduled for the patient?: Yes  Appointment Date: 11/20/23  Appointment Time: 0920  Any issues or paperwork you wish to discuss with your PCP?: No  Does this patient qualify for the CCM program?: No (risk score 3)    Transitional Care  Number of attempts made to contact patient: 1  Current or previous attempts competed within two business days of discharge? : Yes  Provided education regarding treatment plan, medications, self-management, ADLs?: Yes  Has patient completed an Advanced Directive?: No  Has the Care Manager's phone number provided?: No  Is there anything else I can help you with?: No    Discharge Summary  Chief Complaint: chest pain  Admitting Diagnosis: NSTEMI (non-ST elevated myocardial infarction) ([facility]) (I21.4)  Discharge Diagnosis: NSTEMI (non-ST elevated myocardial infarction)

## 2023-11-16 NOTE — PROGRESS NOTES
Patient is scheduled on 12-8-23 for a [facility] w/poss with . Patient was told to hold viagra for 72hrs prior and to check in at 6:30 for an 8:30 procedure. H&P will be done on 12-7-23 by Dr. Montano. Pre admit to call patient.

## 2023-11-17 ENCOUNTER — APPOINTMENT (OUTPATIENT)
Dept: ADMISSIONS | Facility: MEDICAL CENTER | Age: 73
End: 2023-11-17
Attending: PHYSICIAN ASSISTANT
Payer: MEDICARE

## 2023-11-20 ENCOUNTER — OFFICE VISIT (OUTPATIENT)
Dept: MEDICAL GROUP | Facility: PHYSICIAN GROUP | Age: 73
End: 2023-11-20
Payer: MEDICARE

## 2023-11-20 VITALS
OXYGEN SATURATION: 98 % | BODY MASS INDEX: 29.62 KG/M2 | DIASTOLIC BLOOD PRESSURE: 68 MMHG | SYSTOLIC BLOOD PRESSURE: 110 MMHG | WEIGHT: 211.6 LBS | HEIGHT: 71 IN | HEART RATE: 58 BPM | TEMPERATURE: 97.5 F

## 2023-11-20 DIAGNOSIS — Z09 HOSPITAL DISCHARGE FOLLOW-UP: Primary | ICD-10-CM

## 2023-11-20 PROCEDURE — 3074F SYST BP LT 130 MM HG: CPT | Performed by: STUDENT IN AN ORGANIZED HEALTH CARE EDUCATION/TRAINING PROGRAM

## 2023-11-20 PROCEDURE — 3078F DIAST BP <80 MM HG: CPT | Performed by: STUDENT IN AN ORGANIZED HEALTH CARE EDUCATION/TRAINING PROGRAM

## 2023-11-20 PROCEDURE — 99496 TRANSJ CARE MGMT HIGH F2F 7D: CPT | Performed by: STUDENT IN AN ORGANIZED HEALTH CARE EDUCATION/TRAINING PROGRAM

## 2023-11-20 ASSESSMENT — FIBROSIS 4 INDEX: FIB4 SCORE: 1.69

## 2023-11-20 NOTE — PROGRESS NOTES
Subjective:     Rob Clifton is a 73 y.o. male who presents for Hospital Follow-up.    Transitional Care Management  TCM Outreach Date and Time: Filed (11/15/2023 10:22 AM)    Discharge Questions  Actual Discharge Date: 11/14/23  Now that you are home, how are you feeling?: Good  Did you receive any new prescriptions?: Yes  Were you able to get them filled?: Yes  Meds to Bed or Pharmacy filled?: Pharmacy  Do you have any questions about your current medications or new medications (Review Med Rec)?: No  Do you have a follow up appointment scheduled with your PCP?: No  Was an appointment scheduled for the patient?: Yes  Appointment Date: 11/20/23  Appointment Time: 0920  Any issues or paperwork you wish to discuss with your PCP?: No  Does this patient qualify for the CCM program?: No (risk score 3)    Transitional Care  Number of attempts made to contact patient: 1  Current or previous attempts competed within two business days of discharge? : Yes  Provided education regarding treatment plan, medications, self-management, ADLs?: Yes  Has patient completed an Advanced Directive?: No  Has the Care Manager's phone number provided?: No  Is there anything else I can help you with?: No    Discharge Summary  Chief Complaint: chest pain  Admitting Diagnosis: NSTEMI (non-ST elevated myocardial infarction) (Formerly [facility]) (I21.4)  Discharge Diagnosis: NSTEMI (non-ST elevated myocardial infarction)        HPI:   Burton is a 73-year-old male with a past medical history of hyperlipidemia presented to the ER 11/13/23 with chest pain radiating to the left shoulder.  He was admitted for non-STEMI and underwent angiogram with PCI to the OM.  He was feeling much better the following day and was discharged 11/14.  Cardiology recommended dual antiplatelet therapy for 1 year.  He is on a high intensity statin and a beta-blocker.  He has follow-up with cardiology scheduled and plan is for outpatient staged PCI with cardiology and will establish  with cardiac rehab.    Patient has been feeling well since discharge.  He has been doing light exercise.  Denies any chest pain.  Feels mildly winded with cardiovascular exercise, otherwise no shortness of breath.  Denies any leg swelling.  No fevers, chills, nausea, vomiting.      Current medicines (including reconciliation performed today)  Current Outpatient Medications   Medication Sig Dispense Refill    aspirin 81 MG EC tablet Take 1 Tablet by mouth every day. 100 Tablet 2    metoprolol tartrate (LOPRESSOR) 25 MG Tab Take 0.5 Tablets by mouth 2 times a day. 60 Tablet 2    prasugrel (EFFIENT) 10 MG Tab Take 1 Tablet by mouth every day. 30 Tablet 2    rosuvastatin (CRESTOR) 20 MG Tab Take 1 Tablet by mouth every evening. 30 Tablet 2    Doxylamine Succinate, Sleep, (SLEEP AID PO) Take 1 Dose by mouth at bedtime as needed (SLEEP).      tamsulosin (FLOMAX) 0.4 MG capsule Take 1 Capsule by mouth 1/2 hour after breakfast. 90 Capsule 1    meloxicam (MOBIC) 15 MG tablet Take 1 Tablet by mouth every day. 90 Tablet 1    sildenafil citrate (VIAGRA) 100 MG tablet Take 1 Tablet by mouth 1 time a day as needed for Erectile Dysfunction for up to 100 days. 100 Tablet 0    Misc Natural Products (LUTEIN 20 PO) Take 20 mg by mouth every day.       No current facility-administered medications for this visit.       Allergies:   Bactrim    Social History     Tobacco Use    Smoking status: Never    Smokeless tobacco: Never   Vaping Use    Vaping Use: Some days    Substances: THC    Devices: Disposable   Substance Use Topics    Alcohol use: Yes     Alcohol/week: 8.4 oz     Types: 14 Glasses of wine per week     Comment: 1-2 daily    Drug use: Yes     Types: Marijuana, Oral, Inhaled     Comment: marijuana occ., last use 2 days ago       ROS:  See HPI    Objective:     Vitals:    11/20/23 0912   BP: 110/68   BP Location: Left arm   Patient Position: Sitting   BP Cuff Size: Adult   Pulse: (!) 58   Temp: 36.4 °C (97.5 °F)   TempSrc:  "Temporal   SpO2: 98%   Weight: 96 kg (211 lb 9.6 oz)   Height: 1.803 m (5' 11\")     Body mass index is 29.51 kg/m².    Physical Exam:  Gen:  Alert. Non-toxic appearing. No acute distress.   Eyes:  EOMI. No redness or drainage.  Mouth:  Moist mucous membranes.   Neck:  Supple, no adenopathy.  Heart:  Regular rate and rhythm. No murmur, gallop or rub.  Lungs:  CTAB. No wheezes, rales or rhonchi.  Extremities:  No edema.  Neuro: Alert and oriented.  Psych:  Mood and affect are appropriate.      LABS/IMAGING:  Inpatient diagnostic studies & procedures were reviewed.    Lab Results   Component Value Date/Time    CHOLSTRLTOT 102 11/14/2023 0222    TRIGLYCERIDE 102 11/14/2023 0222    HDL 42 11/14/2023 0222    LDL 40 11/14/2023 0222      Echo 11/13:  CONCLUSIONS  Compared to the prior study on 02/28/2022 there is no significant   change.  Normal left ventricular size, thickness, and systolic function.  The left ventricular ejection fraction is visually estimated to be 55%.  Normal diastolic function.  The right ventricle is normal in size and systolic function.  No significant valvular abnormalities.     Left heart catheterization 11/13:  IMPRESSION:  Severe obstructive two-vessel coronary artery disease.  Successful complex percutaneous coronary intervention to the subtotally occluded proximal first obtuse marginal branch.  Mildly reduced left ventricular systolic function.  Normal left heart filling pressures.      Assessment and Plan:   1. Hospital discharge follow-up  - Chart and discharge summary were reviewed.   - Hospitalization and results reviewed with patient.   - Medications reviewed including instructions regarding high risk medications, dosing and side effects.  - Recommended Services: No services needed at this time  - Follow up with cardiology and cardiac rehabilitation as planned.  - Advance directive/POLST on file?  No     Follow-up:Return in about 3 months (around 2/20/2024).    Face-to-face transitional " care management services with HIGH (today's visit is within days post discharge & LACE+ score 59+) medical decision complexity were provided.

## 2023-11-30 ENCOUNTER — PRE-ADMISSION TESTING (OUTPATIENT)
Dept: ADMISSIONS | Facility: MEDICAL CENTER | Age: 73
End: 2023-11-30
Attending: INTERNAL MEDICINE
Payer: MEDICARE

## 2023-12-04 ENCOUNTER — PRE-ADMISSION TESTING (OUTPATIENT)
Dept: ADMISSIONS | Facility: MEDICAL CENTER | Age: 73
End: 2023-12-04
Attending: PHYSICIAN ASSISTANT
Payer: MEDICARE

## 2023-12-04 DIAGNOSIS — Z01.812 PRE-OPERATIVE LABORATORY EXAMINATION: ICD-10-CM

## 2023-12-04 DIAGNOSIS — Z01.810 PRE-OPERATIVE CARDIOVASCULAR EXAMINATION: ICD-10-CM

## 2023-12-04 LAB
ALBUMIN SERPL BCP-MCNC: 4.4 G/DL (ref 3.2–4.9)
ALBUMIN/GLOB SERPL: 1.8 G/DL
ALP SERPL-CCNC: 59 U/L (ref 30–99)
ALT SERPL-CCNC: 11 U/L (ref 2–50)
ANION GAP SERPL CALC-SCNC: 9 MMOL/L (ref 7–16)
APTT PPP: 29.8 SEC (ref 24.7–36)
AST SERPL-CCNC: 14 U/L (ref 12–45)
BILIRUB SERPL-MCNC: 0.6 MG/DL (ref 0.1–1.5)
BUN SERPL-MCNC: 22 MG/DL (ref 8–22)
CALCIUM ALBUM COR SERPL-MCNC: 8.8 MG/DL (ref 8.5–10.5)
CALCIUM SERPL-MCNC: 9.1 MG/DL (ref 8.5–10.5)
CHLORIDE SERPL-SCNC: 104 MMOL/L (ref 96–112)
CO2 SERPL-SCNC: 25 MMOL/L (ref 20–33)
CREAT SERPL-MCNC: 1.02 MG/DL (ref 0.5–1.4)
ERYTHROCYTE [DISTWIDTH] IN BLOOD BY AUTOMATED COUNT: 39.6 FL (ref 35.9–50)
GFR SERPLBLD CREATININE-BSD FMLA CKD-EPI: 78 ML/MIN/1.73 M 2
GLOBULIN SER CALC-MCNC: 2.5 G/DL (ref 1.9–3.5)
GLUCOSE SERPL-MCNC: 98 MG/DL (ref 65–99)
HCT VFR BLD AUTO: 41.6 % (ref 42–52)
HGB BLD-MCNC: 14.3 G/DL (ref 14–18)
INR PPP: 1.04 (ref 0.87–1.13)
MCH RBC QN AUTO: 30.2 PG (ref 27–33)
MCHC RBC AUTO-ENTMCNC: 34.4 G/DL (ref 32.3–36.5)
MCV RBC AUTO: 87.8 FL (ref 81.4–97.8)
PLATELET # BLD AUTO: 208 K/UL (ref 164–446)
PMV BLD AUTO: 10.3 FL (ref 9–12.9)
POTASSIUM SERPL-SCNC: 4.2 MMOL/L (ref 3.6–5.5)
PROT SERPL-MCNC: 6.9 G/DL (ref 6–8.2)
PROTHROMBIN TIME: 13.7 SEC (ref 12–14.6)
RBC # BLD AUTO: 4.74 M/UL (ref 4.7–6.1)
SODIUM SERPL-SCNC: 138 MMOL/L (ref 135–145)
WBC # BLD AUTO: 7.6 K/UL (ref 4.8–10.8)

## 2023-12-04 PROCEDURE — 85610 PROTHROMBIN TIME: CPT

## 2023-12-04 PROCEDURE — 93005 ELECTROCARDIOGRAM TRACING: CPT

## 2023-12-04 PROCEDURE — 36415 COLL VENOUS BLD VENIPUNCTURE: CPT

## 2023-12-04 PROCEDURE — 85027 COMPLETE CBC AUTOMATED: CPT

## 2023-12-04 PROCEDURE — 85730 THROMBOPLASTIN TIME PARTIAL: CPT

## 2023-12-04 PROCEDURE — 80053 COMPREHEN METABOLIC PANEL: CPT

## 2023-12-05 LAB — EKG IMPRESSION: NORMAL

## 2023-12-05 PROCEDURE — 93010 ELECTROCARDIOGRAM REPORT: CPT | Performed by: INTERNAL MEDICINE

## 2023-12-07 ENCOUNTER — OFFICE VISIT (OUTPATIENT)
Dept: CARDIOLOGY | Facility: MEDICAL CENTER | Age: 73
End: 2023-12-07
Attending: INTERNAL MEDICINE
Payer: MEDICARE

## 2023-12-07 VITALS
SYSTOLIC BLOOD PRESSURE: 138 MMHG | OXYGEN SATURATION: 97 % | HEART RATE: 67 BPM | DIASTOLIC BLOOD PRESSURE: 82 MMHG | BODY MASS INDEX: 29.4 KG/M2 | HEIGHT: 71 IN | WEIGHT: 210 LBS

## 2023-12-07 DIAGNOSIS — I25.10 CORONARY ARTERY DISEASE, OCCLUSIVE: ICD-10-CM

## 2023-12-07 DIAGNOSIS — I20.89 ANGINA OF EFFORT (HCC): ICD-10-CM

## 2023-12-07 DIAGNOSIS — Z95.5 S/P DRUG ELUTING CORONARY STENT PLACEMENT: ICD-10-CM

## 2023-12-07 DIAGNOSIS — I21.4 NSTEMI (NON-ST ELEVATED MYOCARDIAL INFARCTION) (HCC): ICD-10-CM

## 2023-12-07 DIAGNOSIS — E78.00 PURE HYPERCHOLESTEROLEMIA: ICD-10-CM

## 2023-12-07 PROCEDURE — 3075F SYST BP GE 130 - 139MM HG: CPT | Performed by: INTERNAL MEDICINE

## 2023-12-07 PROCEDURE — 3079F DIAST BP 80-89 MM HG: CPT | Performed by: INTERNAL MEDICINE

## 2023-12-07 PROCEDURE — 99214 OFFICE O/P EST MOD 30 MIN: CPT | Performed by: INTERNAL MEDICINE

## 2023-12-07 PROCEDURE — 99212 OFFICE O/P EST SF 10 MIN: CPT | Performed by: INTERNAL MEDICINE

## 2023-12-07 ASSESSMENT — ENCOUNTER SYMPTOMS
MYALGIAS: 0
SHORTNESS OF BREATH: 1
PALPITATIONS: 0
LOSS OF CONSCIOUSNESS: 0
COUGH: 0
DIZZINESS: 0

## 2023-12-07 ASSESSMENT — FIBROSIS 4 INDEX: FIB4 SCORE: 1.44
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
## 2023-12-08 ENCOUNTER — APPOINTMENT (OUTPATIENT)
Dept: CARDIOLOGY | Facility: MEDICAL CENTER | Age: 73
End: 2023-12-08
Attending: PHYSICIAN ASSISTANT
Payer: MEDICARE

## 2023-12-08 ENCOUNTER — HOSPITAL ENCOUNTER (OUTPATIENT)
Facility: MEDICAL CENTER | Age: 73
End: 2023-12-08
Attending: INTERNAL MEDICINE | Admitting: INTERNAL MEDICINE
Payer: MEDICARE

## 2023-12-08 VITALS
SYSTOLIC BLOOD PRESSURE: 119 MMHG | TEMPERATURE: 97 F | DIASTOLIC BLOOD PRESSURE: 64 MMHG | OXYGEN SATURATION: 96 % | RESPIRATION RATE: 16 BRPM | HEART RATE: 62 BPM

## 2023-12-08 DIAGNOSIS — I21.4 NSTEMI (NON-ST ELEVATED MYOCARDIAL INFARCTION) (HCC): ICD-10-CM

## 2023-12-08 DIAGNOSIS — I24.0 ISCHEMIC HEART DISEASE DUE TO CORONARY ARTERY OBSTRUCTION (HCC): ICD-10-CM

## 2023-12-08 DIAGNOSIS — I25.9 ISCHEMIC HEART DISEASE DUE TO CORONARY ARTERY OBSTRUCTION (HCC): ICD-10-CM

## 2023-12-08 DIAGNOSIS — I25.10 CORONARY ARTERY DISEASE, OCCLUSIVE: ICD-10-CM

## 2023-12-08 LAB
ACT BLD: 239 SEC (ref 74–137)
ACT BLD: 255 SEC (ref 74–137)
ACT BLD: 261 SEC (ref 74–137)
EKG IMPRESSION: NORMAL

## 2023-12-08 PROCEDURE — 160035 HCHG PACU - 1ST 60 MINS PHASE I

## 2023-12-08 PROCEDURE — 92979 ENDOLUMINL IVUS OCT C EA: CPT | Mod: 26 | Performed by: INTERNAL MEDICINE

## 2023-12-08 PROCEDURE — 85347 COAGULATION TIME ACTIVATED: CPT

## 2023-12-08 PROCEDURE — 160046 HCHG PACU - 1ST 60 MINS PHASE II

## 2023-12-08 PROCEDURE — 160002 HCHG RECOVERY MINUTES (STAT)

## 2023-12-08 PROCEDURE — 92978 ENDOLUMINL IVUS OCT C 1ST: CPT | Mod: 26,LD | Performed by: INTERNAL MEDICINE

## 2023-12-08 PROCEDURE — 92979 ENDOLUMINL IVUS OCT C EA: CPT | Mod: LC

## 2023-12-08 PROCEDURE — 700101 HCHG RX REV CODE 250

## 2023-12-08 PROCEDURE — 93010 ELECTROCARDIOGRAM REPORT: CPT | Mod: 59 | Performed by: INTERNAL MEDICINE

## 2023-12-08 PROCEDURE — 700111 HCHG RX REV CODE 636 W/ 250 OVERRIDE (IP)

## 2023-12-08 PROCEDURE — 93452 LEFT HRT CATH W/VENTRCLGRPHY: CPT | Mod: 26,LM | Performed by: INTERNAL MEDICINE

## 2023-12-08 PROCEDURE — 99152 MOD SED SAME PHYS/QHP 5/>YRS: CPT | Performed by: INTERNAL MEDICINE

## 2023-12-08 PROCEDURE — 700117 HCHG RX CONTRAST REV CODE 255: Performed by: INTERNAL MEDICINE

## 2023-12-08 PROCEDURE — 92920 PRQ TRLUML C ANGIOP 1ART&/BR: CPT | Mod: 59,LC | Performed by: INTERNAL MEDICINE

## 2023-12-08 PROCEDURE — 92978 ENDOLUMINL IVUS OCT C 1ST: CPT

## 2023-12-08 PROCEDURE — 92928 PRQ TCAT PLMT NTRAC ST 1 LES: CPT | Mod: LD | Performed by: INTERNAL MEDICINE

## 2023-12-08 PROCEDURE — 160047 HCHG PACU  - EA ADDL 30 MINS PHASE II

## 2023-12-08 PROCEDURE — 93005 ELECTROCARDIOGRAM TRACING: CPT | Performed by: INTERNAL MEDICINE

## 2023-12-08 RX ORDER — VERAPAMIL HYDROCHLORIDE 2.5 MG/ML
INJECTION, SOLUTION INTRAVENOUS
Status: COMPLETED
Start: 2023-12-08 | End: 2023-12-08

## 2023-12-08 RX ORDER — MIDAZOLAM HYDROCHLORIDE 1 MG/ML
INJECTION INTRAMUSCULAR; INTRAVENOUS
Status: COMPLETED
Start: 2023-12-08 | End: 2023-12-08

## 2023-12-08 RX ORDER — HEPARIN SODIUM 1000 [USP'U]/ML
INJECTION, SOLUTION INTRAVENOUS; SUBCUTANEOUS
Status: COMPLETED
Start: 2023-12-08 | End: 2023-12-08

## 2023-12-08 RX ORDER — HEPARIN SODIUM 200 [USP'U]/100ML
INJECTION, SOLUTION INTRAVENOUS
Status: COMPLETED
Start: 2023-12-08 | End: 2023-12-08

## 2023-12-08 RX ORDER — SODIUM CHLORIDE 9 MG/ML
INJECTION, SOLUTION INTRAVENOUS CONTINUOUS
Status: DISCONTINUED | OUTPATIENT
Start: 2023-12-08 | End: 2023-12-08 | Stop reason: HOSPADM

## 2023-12-08 RX ORDER — SODIUM CHLORIDE 9 MG/ML
1000 INJECTION, SOLUTION INTRAVENOUS CONTINUOUS
Status: DISCONTINUED | OUTPATIENT
Start: 2023-12-08 | End: 2023-12-08

## 2023-12-08 RX ORDER — LIDOCAINE HYDROCHLORIDE 20 MG/ML
INJECTION, SOLUTION INFILTRATION; PERINEURAL
Status: COMPLETED
Start: 2023-12-08 | End: 2023-12-08

## 2023-12-08 RX ADMIN — HEPARIN SODIUM 1000 UNITS: 1000 INJECTION, SOLUTION INTRAVENOUS; SUBCUTANEOUS at 09:42

## 2023-12-08 RX ADMIN — MIDAZOLAM 2 MG: 1 INJECTION INTRAMUSCULAR; INTRAVENOUS at 09:38

## 2023-12-08 RX ADMIN — FENTANYL CITRATE 100 MCG: 50 INJECTION, SOLUTION INTRAMUSCULAR; INTRAVENOUS at 10:05

## 2023-12-08 RX ADMIN — HEPARIN SODIUM 1000 UNITS: 1000 INJECTION, SOLUTION INTRAVENOUS; SUBCUTANEOUS at 09:28

## 2023-12-08 RX ADMIN — MIDAZOLAM 2 MG: 1 INJECTION INTRAMUSCULAR; INTRAVENOUS at 09:01

## 2023-12-08 RX ADMIN — MIDAZOLAM 2 MG: 1 INJECTION INTRAMUSCULAR; INTRAVENOUS at 10:05

## 2023-12-08 RX ADMIN — HEPARIN SODIUM 6000 UNITS: 1000 INJECTION, SOLUTION INTRAVENOUS; SUBCUTANEOUS at 09:09

## 2023-12-08 RX ADMIN — VERAPAMIL HYDROCHLORIDE 2.5 MG: 2.5 INJECTION, SOLUTION INTRAVENOUS at 09:01

## 2023-12-08 RX ADMIN — FENTANYL CITRATE 100 MCG: 50 INJECTION, SOLUTION INTRAMUSCULAR; INTRAVENOUS at 09:06

## 2023-12-08 RX ADMIN — IOHEXOL 98 ML: 350 INJECTION, SOLUTION INTRAVENOUS at 10:19

## 2023-12-08 RX ADMIN — HEPARIN SODIUM 1000 UNITS: 1000 INJECTION, SOLUTION INTRAVENOUS; SUBCUTANEOUS at 10:27

## 2023-12-08 RX ADMIN — NITROGLYCERIN 10 ML: 20 INJECTION INTRAVENOUS at 09:01

## 2023-12-08 RX ADMIN — HEPARIN SODIUM: 1000 INJECTION, SOLUTION INTRAVENOUS; SUBCUTANEOUS at 09:01

## 2023-12-08 RX ADMIN — HEPARIN SODIUM 2000 UNITS: 200 INJECTION, SOLUTION INTRAVENOUS at 08:59

## 2023-12-08 RX ADMIN — LIDOCAINE HYDROCHLORIDE: 20 INJECTION, SOLUTION INFILTRATION; PERINEURAL at 09:01

## 2023-12-08 NOTE — DISCHARGE INSTRUCTIONS
HOME CARE INSTRUCTIONS    ACTIVITY: Rest and take it easy for the first 24 hours.  A responsible adult is recommended to remain with you during that time.  It is normal to feel sleepy.  We encourage you to not do anything that requires balance, judgment or coordination.    FOR 24 HOURS DO NOT:  Drive, operate machinery or run household appliances.  Drink beer or alcoholic beverages.  Make important decisions or sign legal documents.    SPECIAL INSTRUCTIONS: POST ANGIOGRAM  General Care Instructions  Maintain a bandage over the incision site for 24 hours.  It's normal to find a small bruise or dime-sized lump at the insertion site. This should disappear within a few weeks.  Do not apply lotions or powders to the site.  Do not immerse the catheter insertion site in water (bathtub/swimming) for five days. It is ok to shower 24 hours after the procedure.  You may resume your normal diet immediately; on the day of your procedure, drink 6-10 glasses of water to help flush the contrast liquid out of your system.  If the doctor inserted the catheter in through your groin:  Walking short distances on a flat surface is OK. Limit going up/down stairs for the first 2 days.  DO NOT do yard work, drive, squat, lift heavy objects, or play sports for 2 days; or until your health care provider tells you it is OK.  If the doctor inserted the catheter in your arm:  For 24 hours, DO NOT lift anything heavier than 10 pounds (approximately a gallon of milk). DO NOT do any heavy pushing, pulling, or twisting.    Medications  If your current medications need to be changed, you will be provided with an updated list of your medications prior to discharge.  If you take warfarin (Coumadin), resume taking your usual dose the evening after the procedure.  DO NOT STOP taking prescribed blood thinning (anti-platelet) medications unless instructed by your cardiologist.  These medications include:  Aspirin, Clopidogrel (Plavix), Ticagrelor  (Brilinta), or Prasugrel (Effient)   If you take one of the following anticoagulants, RESUME 24 HOURS after your procedure:  Apixiban (Eliquis), Rivaroxaban (Xarelto), Dabigatran (Pradaxa), Edoxaban (Savaysa)  If you take metformin (Glucophage), RESUME 48 HOURS after your procedure.    When to call your healthcare provider  Call your cardiologist right away at [phone] if you have any of the following:   Problems/Concerns taking any of your prescribed heart medicines.   The insertion site has increasing pain, swelling, redness, bleeding, or drainage.   Your arm or leg below where the insertion site changes color, is cool, or is numb.   You have chest pain or shortness of breath that does not go away with rest.   Your pulse feels irregular -- very slow (less than 60 beats/minute) or very fast (over 100 beats/minute).   You have dizziness, fainting, or you are very tired.   You are coughing up blood or yellow or green mucus.   You have chills or a fever over 101°F (38.3°C).    If there is bleeding at the catheter insertion site, apply pressure for 10 minutes.  If bleeding persists, call 911, and continue to hold pressure until advanced medical support arrives.        Exercising Safely After Percutaneous Coronary Intervention (PCI)  After percutaneous coronary intervention (PCI), which involves angioplasty and often stenting, it's important to focus on your heart health. Exercise can help strengthen your heart. It can also help you feel good and improve your overall health. Talk with your health care provider or cardiac rehab team member about good options for you.  Start slowly. Work up to more vigorous exercise as you get stronger. Aim for at least 150 minutes of exercise each week.  Include aerobic activities. These make the heart beat faster. They work the heart and lungs, and improve the body's ability to use oxygen. Good choices include walking, swimming, and biking .  Always follow your doctor's  recommendation for exercise.   You have been referred to cardiac rehabilitation, which is important for your recovery.  You may contact [facility] at [phone] to learn more and schedule a visit.        Lifestyle Management After Percutaneous Coronary Intervention (PCI)  Percutaneous coronary intervention (PCI)  involves angioplasty and often stenting. This procedure can open arteries and relieve symptoms. But, it doesn't cure coronary artery disease. New blockages can still form. You need to take steps to prevent this by managing risk factors. Doing so will help make your heart and arteries healthier. Your doctor may prescribe cardiac rehabilitation to help with this lifelong process.  Understanding risk factors  Some risk factors for coronary artery disease can be controlled. These include smoking, high blood pressure, cholesterol, diabetes, and obesity. They can be managed with medication, diet, and exercise. Support and counseling can also play a role. The effort will pay off! Managing risk factors can help you be more active, feel better, and reduce the risk of heart attack.    If you smoke, quit!  If your doctor has been urging you to quit smoking, it's for good reasons. Smoking damages your heart, blood vessels, and lungs. The good news is that quitting can halt or even reverse the damage of smoking. To quit now:  Get medical help. Ask your doctor for advice on stop-smoking programs. Also ask about medications or nicotine replacement therapy products that may help you quit smoking.  Get support. Join a support group. Ask for help from your family and friends.  Don't give up. It often takes several tries to succeed in quitting smoking.  Avoid secondhand smoke. Ask family and friends not to smoke around you.      DIET: To avoid nausea, slowly advance diet as tolerated, avoiding spicy or greasy foods for the first day.  Add more substantial food to your diet according to your  physician's instructions.  Babies can be fed formula or breast milk as soon as they are hungry.  INCREASE FLUIDS AND FIBER TO AVOID CONSTIPATION.    SURGICAL DRESSING/BATHING: Keep dressing dry for 24 hours. May remove dressing and shower after 2pm on 12/9, do not need to replace dressing. Do not submerge in water or bath for 7 days.       MEDICATIONS: Resume taking daily medication.  Take prescribed pain medication with food.  If no medication is prescribed, you may take non-aspirin pain medication if needed.  PAIN MEDICATION CAN BE VERY CONSTIPATING.  Take a stool softener or laxative such as senokot, pericolace, or milk of magnesia if needed.    A follow-up appointment should be arranged with your doctor in 1 week with Dr. Hernandez; call to schedule.    You should CALL YOUR PHYSICIAN if you develop:  Fever greater than 101 degrees F.  Pain not relieved by medication, or persistent nausea or vomiting.  Excessive bleeding (blood soaking through dressing) or unexpected drainage from the wound.  Extreme redness or swelling around the incision site, drainage of pus or foul smelling drainage.  Inability to urinate or empty your bladder within 8 hours.  Problems with breathing or chest pain.    You should call 911 if you develop problems with breathing or chest pain.  If you are unable to contact your doctor or surgical center, you should go to the nearest emergency room or urgent care center.  Physician's telephone #: [phone]    MILD FLU-LIKE SYMPTOMS ARE NORMAL.  YOU MAY EXPERIENCE GENERALIZED MUSCLE ACHES, THROAT IRRITATION, HEADACHE AND/OR SOME NAUSEA.    If any questions arise, call your doctor.  If your doctor is not available, please feel free to call the Surgical Center at [phone].  The Center is open Monday through Friday from 7AM to 7PM.      A registered nurse may call you a few days after your surgery to see how you are doing after your procedure.    You may also receive a survey in the mail  within the next two weeks and we ask that you take a few moments to complete the survey and return it to us.  Our goal is to provide you with very good care and we value your comments.     Depression / Suicide Risk    As you are discharged from this [facility] facility, it is important to learn how to keep safe from harming yourself.    Recognize the warning signs:  Abrupt changes in personality, positive or negative- including increase in energy   Giving away possessions  Change in eating patterns- significant weight changes-  positive or negative  Change in sleeping patterns- unable to sleep or sleeping all the time   Unwillingness or inability to communicate  Depression  Unusual sadness, discouragement and loneliness  Talk of wanting to die  Neglect of personal appearance   Rebelliousness- reckless behavior  Withdrawal from people/activities they love  Confusion- inability to concentrate     If you or a loved one observes any of these behaviors or has concerns about self-harm, here's what you can do:  Talk about it- your feelings and reasons for harming yourself  Remove any means that you might use to hurt yourself (examples: pills, rope, extension cords, firearm)  Get professional help from the community (Mental Health, Substance Abuse, psychological counseling)  Do not be alone:Call your Safe Contact- someone whom you trust who will be there for you.  Call your local CRISIS HOTLINE 701-3500 or 323-538-1179  Call your local Children's Mobile Crisis Response Team [place] (034) 272-1002 or www.[employer]  Call the toll free National Suicide Prevention Hotlines   National Suicide Prevention Lifeline 959-507-MPZE (7403)  National Hope Line Network 800-SUICIDE (453-9865)    I acknowledge receipt and understanding of these Home Care instructions.

## 2023-12-08 NOTE — PROCEDURES
Cardiac Catheterization Procedure Note    DATE: 12/8/2023    : Mehran Hernandez MD    PROCEDURES PERFORMED:  Left heart catheterization  Limited coronary angiography  Intravascular ultrasound of the left main coronary artery  Intravascular ultrasound of the left anterior descending coronary artery  Intravascular ultrasound of the left circumflex coronary artery  Percutaneous coronary intervention to the mid left main coronary artery into the mid left anterior descending coronary artery with overlapping 3.5 x 26 mm and 2.5 x 34 mm Mando drug-eluting stents  Moderate conscious sedation    INDICATIONS:  The patient is a 73-year-old gentleman referred for staged percutaneous coronary invention to the left anterior descending coronary artery.  He has had ongoing stable anginal symptoms following his recent percutaneous coronary invention to the subtotally occluded first obtuse marginal branch.    CONSENT:  The complete alternatives, risks, and benefits of the procedure were explained to the patient. Signed informed consent was obtained and placed in the chart prior to the procedure.  A timeout was performed prior to beginning the procedure.    MEDICATIONS:  Lidocaine  Fentanyl  Midazolam  Nitroglycerin  Verapamil  Heparin    MODERATE CONSCIOUS SEDATION:  I personally supervised the administration of moderate conscious sedation by the nursing staff for 102 minutes.  Sedation start time: 8:37 AM  Sedation end time: 10:19 AM    CONTRAST: Omnipaque 98 cc    ACCESS: 6-Palauan Glidesheath in the right radial artery.    ESTIMATED BLOOD LOSS: 20 cc    COMPLICATIONS: None    PROCEDURE IN DETAIL:  The patient was brought to the cardiac catheterization laboratory in the fasting state.  The skin over the right wrist and right groin was prepped and draped in the usual sterile fashion. Lidocaine infiltration was used to anesthetize the tissue over the right radial artery.  Due to the small size of the vessel and significant  tortuosity in the mid forearm, we were unable to place a 6-Kittitian Glidesheath in the right radial artery despite multiple attempts.  Therefore, the tissue over the right femoral artery was anesthetized with lidocaine infiltration.  Using the micropuncture technique, a 6-Kittitian Milton sheath was inserted in the right femoral artery.    A 6-Kittitian EBU-3.5 guide catheter was unable to adequately engage the ostium of the left main coronary artery and it was exchanged for a 6-Kittitian EBU-4.0 guide catheter, which successfully engaged the left main.  A Run-through wire was then advanced into the distal aspect of the LAD and a Prowater wire was advanced into the second diagonal branch.  A 2.5 x 15 mm compliant balloon was then used to perform balloon angioplasty of the ostium of the second diagonal at a maximum pressure of 12 luciano.  The balloon was removed and an [place] CorTec IVUS catheter was then advanced over the LAD wire and IVUS was performed.  The distal reference vessel had minimal plaque with a true vessel diameter of approximately 3.9 mm.  The mid to proximal proximal LAD had significant plaque throughout with severe plaque at the ostium that was not an acceptable landing zone.  The left main coronary artery had mild to moderate concentric plaque and was a better landing zone with a true vessel diameter of approximately 4.7 mm and a luminal diameter of approximately 4.4 mm.  Following intravascular ultrasound, we proceeded with percutaneous coronary intervention.    A 3.0 x 20 mm compliant balloon was then advanced over the guidewire and was used to predilate the lesion at a maximum pressure of 12 luciano.  Following predilation, a 2.5 x 34 mm Mando drug-eluting stent was advanced over the guidewire and was deployed across the mid aspect of the lesion at a maximum pressure of 16 luciano.  The Prowater wire was then withdrawn and was used to protect the ostium of the circumflex.  The IVUS catheter was then reinserted over the  Prowater wire and IVUS was performed to evaluate the ostium of the circumflex, which had mild concentric plaque with a true vessel diameter of approximately 3.5 mm and did not appear to require a stent.  A 3.5 x 26 mm Mando drug-eluting stent was then deployed from the mid left main overlapping the proximal aspect of the previously placed stent in the mid LAD at a maximum pressure of 16 mm.  After deployment, the stents were then sequentially postdilated with 3.5 x 20 mm and 4.0 x 20 mm noncompliant balloons.    The Prowater wire was then withdrawn and was used to wire through the circumflex stents.  A 2.0 x 12 mm compliant balloon was then used to open the stent struts and a 3.5 x 12 mm compliant balloon was used to close the stent struts to the ostium of the circumflex.  A 4.5 x 8 mm noncompliant balloon was then used to perform proximal optimization of the distal left main bifurcation at a maximum pressure of 16 luciano.  Final cineangiograms were then obtained in orthogonal views, which demonstrated excellent stent expansion and apposition with no evidence of wire perforation, thrombus or dissection.  Following intervention, a 6-Macedonian pigtail catheter was advanced over a J-wire into the left ventricular cavity and left ventricular pressures were measured to assist with post procedure fluid administration.  At the completion of the case, all wires, catheters, and sheaths were removed. A 6-Macedonian Angio-Seal device was deployed for hemostasis.    HEMODYNAMICS:   Aortic pressure: 102/49 mmHg  Pre A-wave pressure: 11 mmHg  No significant aortic gradient on pullback    LIMITED CORONARY ANGIOGRAPHY:  Limited coronary angiography redemonstrated severe a proximal 70% lesion moderate sequential mid 50% lesions in the left anterior descending coronary and a widely patent first obtuse marginal branch stent.    INTRAVASCULAR ULTRASOUND:  See IVUS findings and procedure details above.    PERCUTANEOUS CORONARY  INTERVENTION:  Lesion location: Ostial to mid left anterior descending coronary  Lesion type: C  Lesion length: 50 mm  Pre-intervention TATY flow: 3  Post-intervention TATY flow: 3  Pre-intervention stenosis: 70%  Post-intervention stenosis: 10%  Stent #1: 3.5 x 26 mm Mando drug-eluting stent  Stent #2: 2.5 x 34 mm Mando drug-eluting stent    IMPRESSION:  Successful percutaneous coronary intervention from the mid left anterior descending coronary into the mid left anterior descending coronary with overlapping 3.5 x 26 mm and 2.5 x 34 mm Mando drug-eluting stents.  Widely patent recently placed stent in the first obtuse marginal branch.  Normal left heart filling pressures.    RECOMMENDATIONS:  Recover in post procedure unit with plan for same-day discharge.  TR band release per protocol.  Bedrest for 4 hours.  Continue dual endplate therapy with aspirin and clopidogrel.  Continue optimal medical management of cardiovascular risk factors.    NOTIFICATION:  The patient's family was called and notified of the results of his cardiac catheterization.

## 2023-12-08 NOTE — OR NURSING
1045: Assumed care of patient, handoff report received form ROMANA Doherty. Patient awake, oriented x4, moving all extremities, denies pain and nausea. Right TR band in place, 4 puncture sites, gauze and tegaderm placed over one site with slight drainage-area soft. Right femoral site, soft, dressing C/D/I. Four hours bed rest. EKG aware of order.     1055: Wife at bedside.     1100: EKG at bedside. Sites remains stable. Patient tolerating oral Vinke of fluids.     1115: TR band removed without difficulty, site remains soft.     1300: Patient resting, appears comfortable.     1430: Bed rest completed, patient tolerated ambulation, voided, TR band and groin site remain soft, dressings C/D/I.     1440: Discharge instructions reviewed with patient and wife. Belongings retrieved form locker. Patient dressed, PIV removed.

## 2023-12-12 ENCOUNTER — TELEPHONE (OUTPATIENT)
Dept: CARDIOLOGY | Facility: MEDICAL CENTER | Age: 73
End: 2023-12-12
Payer: MEDICARE

## 2023-12-12 NOTE — TELEPHONE ENCOUNTER
LISA        Caller: Rob Clifton      Topic/issue: Patient was calling with questions post his procedure and had some questions about it and asked for a call back      Callback Number: [phone]      Thank you    -Chris SEE

## 2023-12-13 NOTE — TELEPHONE ENCOUNTER
Phone number called: [phone]    Call outcome: pt was concern that it is taking him longer to recover compared to his first stent placement. Pt also stated that he doesn't feel worse but just feel that he is taking a lot more time to recover. Pt told to listen to his body and to give it time to heal and to watch for worsening symptoms. Pt denies CP, SOB and dizziness. Pt is scheduled for f/V with DV on 01/05/24

## 2023-12-19 DIAGNOSIS — M25.551 RIGHT HIP PAIN: ICD-10-CM

## 2023-12-26 DIAGNOSIS — N52.8 OTHER MALE ERECTILE DYSFUNCTION: ICD-10-CM

## 2023-12-27 RX ORDER — SILDENAFIL 100 MG/1
100 TABLET, FILM COATED ORAL
Qty: 30 TABLET | Refills: 0 | Status: SHIPPED | OUTPATIENT
Start: 2023-12-27 | End: 2024-04-05

## 2024-01-05 ENCOUNTER — OFFICE VISIT (OUTPATIENT)
Dept: CARDIOLOGY | Facility: MEDICAL CENTER | Age: 74
End: 2024-01-05
Attending: NURSE PRACTITIONER
Payer: MEDICARE

## 2024-01-05 VITALS
SYSTOLIC BLOOD PRESSURE: 110 MMHG | HEART RATE: 86 BPM | RESPIRATION RATE: 12 BRPM | OXYGEN SATURATION: 96 % | HEIGHT: 71 IN | WEIGHT: 212 LBS | BODY MASS INDEX: 29.68 KG/M2 | DIASTOLIC BLOOD PRESSURE: 70 MMHG

## 2024-01-05 DIAGNOSIS — I25.10 CORONARY ARTERY DISEASE, OCCLUSIVE: ICD-10-CM

## 2024-01-05 DIAGNOSIS — Z95.5 S/P DRUG ELUTING CORONARY STENT PLACEMENT: ICD-10-CM

## 2024-01-05 DIAGNOSIS — E78.00 PURE HYPERCHOLESTEROLEMIA: ICD-10-CM

## 2024-01-05 DIAGNOSIS — I21.4 NSTEMI (NON-ST ELEVATED MYOCARDIAL INFARCTION) (HCC): ICD-10-CM

## 2024-01-05 PROCEDURE — 3078F DIAST BP <80 MM HG: CPT | Performed by: NURSE PRACTITIONER

## 2024-01-05 PROCEDURE — 99212 OFFICE O/P EST SF 10 MIN: CPT | Performed by: NURSE PRACTITIONER

## 2024-01-05 PROCEDURE — 3074F SYST BP LT 130 MM HG: CPT | Performed by: NURSE PRACTITIONER

## 2024-01-05 PROCEDURE — 99214 OFFICE O/P EST MOD 30 MIN: CPT | Performed by: NURSE PRACTITIONER

## 2024-01-05 ASSESSMENT — FIBROSIS 4 INDEX: FIB4 SCORE: 1.44
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
## 2024-01-06 ASSESSMENT — ENCOUNTER SYMPTOMS
ORTHOPNEA: 0
LOSS OF CONSCIOUSNESS: 0
DIZZINESS: 0
PALPITATIONS: 0
SHORTNESS OF BREATH: 0

## 2024-01-10 ENCOUNTER — PATIENT MESSAGE (OUTPATIENT)
Dept: MEDICAL GROUP | Facility: PHYSICIAN GROUP | Age: 74
End: 2024-01-10
Payer: MEDICARE

## 2024-01-10 DIAGNOSIS — N40.1 BENIGN PROSTATIC HYPERPLASIA WITH NOCTURIA: ICD-10-CM

## 2024-01-10 DIAGNOSIS — R35.1 BENIGN PROSTATIC HYPERPLASIA WITH NOCTURIA: ICD-10-CM

## 2024-01-10 RX ORDER — TAMSULOSIN HYDROCHLORIDE 0.4 MG/1
0.4 CAPSULE ORAL
Qty: 90 CAPSULE | Refills: 3 | Status: SHIPPED | OUTPATIENT
Start: 2024-01-10 | End: 2024-02-23 | Stop reason: SDUPTHER

## 2024-01-15 ENCOUNTER — NON-PROVIDER VISIT (OUTPATIENT)
Dept: CARDIOLOGY | Facility: MEDICAL CENTER | Age: 74
End: 2024-01-15
Payer: MEDICARE

## 2024-01-15 DIAGNOSIS — Z95.5 STENTED CORONARY ARTERY: Primary | ICD-10-CM

## 2024-01-15 DIAGNOSIS — I21.4 NSTEMI (NON-ST ELEVATED MYOCARDIAL INFARCTION) (HCC): ICD-10-CM

## 2024-01-15 PROCEDURE — G0423 INTENS CARDIAC REHAB NO EXER: HCPCS | Mod: 59 | Performed by: INTERNAL MEDICINE

## 2024-01-15 PROCEDURE — G0422 INTENS CARDIAC REHAB W/EXERC: HCPCS | Performed by: INTERNAL MEDICINE

## 2024-01-15 NOTE — TELEPHONE ENCOUNTER
LEESA  Caller Name: Rob S Clifton           Does the PT have an active prescription (recently filled or refills available)?:Yes    Medication Name & Dosage: metoprolol tartrate (LOPRESSOR) 25 MG Tab     Medication amount left: None    Preferred Pharmacy: [id number] - [place], [place] - [address] [[id number]]     Has the patient been seen in the last year?:01/05/2024    Best Call Back #: [phone]      Thank you,  Isabell LUGO

## 2024-01-16 ENCOUNTER — NON-PROVIDER VISIT (OUTPATIENT)
Dept: CARDIOLOGY | Facility: MEDICAL CENTER | Age: 74
End: 2024-01-16
Payer: MEDICARE

## 2024-01-16 DIAGNOSIS — Z95.5 STENTED CORONARY ARTERY: ICD-10-CM

## 2024-01-16 PROCEDURE — G0422 INTENS CARDIAC REHAB W/EXERC: HCPCS | Performed by: INTERNAL MEDICINE

## 2024-01-16 PROCEDURE — G0423 INTENS CARDIAC REHAB NO EXER: HCPCS | Mod: 59 | Performed by: INTERNAL MEDICINE

## 2024-01-16 NOTE — PROGRESS NOTES
Rob Clifton attended Intensive Cardiac Rehab today from 0900 to 1100. During his time he exercised and attended class.   His education today was a workshop titled: Building Your Action Plan. Patient received handouts and class discussion pertaining to the topic.

## 2024-01-16 NOTE — PROGRESS NOTES
Patient arrived for initial 1:1 Intensive Cardiac Rehabilitation Consultation and Education session with the Registered Nurse.  A total of 60 minutes was spent with the patient during which time a focused cardiovascular assessment was completed and musculoskeletal limitations were addressed in preparation to safely start the exercise portion of the program.  Education regarding the program was explained including exercise goals, precautions, and target heart rate during exercise.  Nutrition goals were reviewed and patient was introduced to the [facility]ikin Program.  Stress management goals were dicussed and patient concerns and questions were answered at this time. Patient arrived for education at 0800 and visit was concluded at 0900. Exercise time was from 0900 to 1000.

## 2024-01-17 ENCOUNTER — NON-PROVIDER VISIT (OUTPATIENT)
Dept: CARDIOLOGY | Facility: MEDICAL CENTER | Age: 74
End: 2024-01-17
Payer: MEDICARE

## 2024-01-17 DIAGNOSIS — Z95.5 STENTED CORONARY ARTERY: ICD-10-CM

## 2024-01-17 PROCEDURE — G0422 INTENS CARDIAC REHAB W/EXERC: HCPCS | Performed by: INTERNAL MEDICINE

## 2024-01-17 PROCEDURE — G0423 INTENS CARDIAC REHAB NO EXER: HCPCS | Mod: 59 | Performed by: INTERNAL MEDICINE

## 2024-01-17 NOTE — PROGRESS NOTES
Rob Clifton attended Intensive Cardiac Rehab today from 9am to 11am. During his time he exercised and attended class.   His education today was a Cooking School titled: Salads & Dressings. Patient received handouts and class discussion pertaining to the topic.

## 2024-01-18 ENCOUNTER — NON-PROVIDER VISIT (OUTPATIENT)
Dept: CARDIOLOGY | Facility: MEDICAL CENTER | Age: 74
End: 2024-01-18
Payer: MEDICARE

## 2024-01-18 DIAGNOSIS — Z95.5 STENTED CORONARY ARTERY: ICD-10-CM

## 2024-01-18 PROCEDURE — G0423 INTENS CARDIAC REHAB NO EXER: HCPCS | Mod: 59 | Performed by: INTERNAL MEDICINE

## 2024-01-18 PROCEDURE — G0422 INTENS CARDIAC REHAB W/EXERC: HCPCS | Performed by: INTERNAL MEDICINE

## 2024-01-18 NOTE — PROGRESS NOTES
Rob Clifton attended Intensive Cardiac Rehab today from 0900 to 1100. During his time he exercised and attended class.   His education today was a workshop titled: Healthy Mindset and Reducing Stress. Patient received handouts and class discussion pertaining to the topic.

## 2024-01-23 ENCOUNTER — NON-PROVIDER VISIT (OUTPATIENT)
Dept: CARDIOLOGY | Facility: MEDICAL CENTER | Age: 74
End: 2024-01-23
Payer: MEDICARE

## 2024-01-23 DIAGNOSIS — Z95.5 STENTED CORONARY ARTERY: ICD-10-CM

## 2024-01-23 PROCEDURE — G0422 INTENS CARDIAC REHAB W/EXERC: HCPCS | Performed by: INTERNAL MEDICINE

## 2024-01-23 PROCEDURE — G0423 INTENS CARDIAC REHAB NO EXER: HCPCS | Mod: 59 | Performed by: INTERNAL MEDICINE

## 2024-01-23 NOTE — PROGRESS NOTES
Rob Clifton attended Intensive Cardiac Rehab today from 0900 to 1100. During his time he exercised and attended class.   His education today was a video titled: Targeting Nutritional Priorities. Patient received handouts and class discussion pertaining to the topic.

## 2024-01-24 ENCOUNTER — NON-PROVIDER VISIT (OUTPATIENT)
Dept: CARDIOLOGY | Facility: MEDICAL CENTER | Age: 74
End: 2024-01-24
Payer: MEDICARE

## 2024-01-24 DIAGNOSIS — Z95.5 STENTED CORONARY ARTERY: ICD-10-CM

## 2024-01-24 PROCEDURE — G0422 INTENS CARDIAC REHAB W/EXERC: HCPCS | Performed by: INTERNAL MEDICINE

## 2024-01-24 PROCEDURE — G0423 INTENS CARDIAC REHAB NO EXER: HCPCS | Mod: 59 | Performed by: INTERNAL MEDICINE

## 2024-01-24 NOTE — PROGRESS NOTES
Rob Clifton attended Intensive Cardiac Rehab today from 0900 to 1100. During his time he exercised and attended class.   His education today was a cooking school titled: Reimage. Patient received handouts and class discussion pertaining to the topic.

## 2024-01-25 ENCOUNTER — NON-PROVIDER VISIT (OUTPATIENT)
Dept: CARDIOLOGY | Facility: MEDICAL CENTER | Age: 74
End: 2024-01-25
Payer: MEDICARE

## 2024-01-25 DIAGNOSIS — Z95.5 STENTED CORONARY ARTERY: ICD-10-CM

## 2024-01-25 PROCEDURE — G0422 INTENS CARDIAC REHAB W/EXERC: HCPCS | Performed by: INTERNAL MEDICINE

## 2024-01-25 PROCEDURE — G0423 INTENS CARDIAC REHAB NO EXER: HCPCS | Mod: 59 | Performed by: INTERNAL MEDICINE

## 2024-01-25 NOTE — PROGRESS NOTES
Rob Clifton attended Intensive Cardiac Rehab today from 0900 to 1100. During his time he exercised and attended class.   His education today was a workshop titled: Biomechanical Limitations. Patient received handouts and class discussion pertaining to the topic.

## 2024-01-30 ENCOUNTER — NON-PROVIDER VISIT (OUTPATIENT)
Dept: CARDIOLOGY | Facility: MEDICAL CENTER | Age: 74
End: 2024-01-30
Payer: MEDICARE

## 2024-01-30 DIAGNOSIS — Z95.5 STENTED CORONARY ARTERY: ICD-10-CM

## 2024-01-30 PROCEDURE — G0423 INTENS CARDIAC REHAB NO EXER: HCPCS | Mod: 59 | Performed by: INTERNAL MEDICINE

## 2024-01-30 PROCEDURE — G0422 INTENS CARDIAC REHAB W/EXERC: HCPCS | Performed by: INTERNAL MEDICINE

## 2024-01-30 NOTE — PROGRESS NOTES
Rbo Clifton attended Intensive Cardiac Rehab today from 0900 to 1100. During his time he exercised and attended class.   His education today was a video titled: Biomechanical Limitations. Patient received handouts and class discussion pertaining to the topic.

## 2024-01-31 ENCOUNTER — NON-PROVIDER VISIT (OUTPATIENT)
Dept: CARDIOLOGY | Facility: MEDICAL CENTER | Age: 74
End: 2024-01-31
Payer: MEDICARE

## 2024-01-31 DIAGNOSIS — Z95.5 STENTED CORONARY ARTERY: ICD-10-CM

## 2024-01-31 PROCEDURE — G0423 INTENS CARDIAC REHAB NO EXER: HCPCS | Mod: 59 | Performed by: INTERNAL MEDICINE

## 2024-01-31 PROCEDURE — G0422 INTENS CARDIAC REHAB W/EXERC: HCPCS | Performed by: INTERNAL MEDICINE

## 2024-01-31 NOTE — PROGRESS NOTES
Rob Clifton attended Intensive Cardiac Rehab today from 0900 to 1100. During his time he exercised and attended class.   His education today was a COOKING SCHOOL titled: SIMPLE SIDES AND SAUCES. Patient received handouts and class discussion pertaining to the topic.

## 2024-02-01 ENCOUNTER — NON-PROVIDER VISIT (OUTPATIENT)
Dept: CARDIOLOGY | Facility: MEDICAL CENTER | Age: 74
End: 2024-02-01
Payer: MEDICARE

## 2024-02-01 DIAGNOSIS — Z95.5 STENTED CORONARY ARTERY: ICD-10-CM

## 2024-02-01 PROCEDURE — G0423 INTENS CARDIAC REHAB NO EXER: HCPCS | Mod: 59 | Performed by: INTERNAL MEDICINE

## 2024-02-01 PROCEDURE — G0422 INTENS CARDIAC REHAB W/EXERC: HCPCS | Performed by: INTERNAL MEDICINE

## 2024-02-01 NOTE — PROGRESS NOTES
Rob Clifton attended Intensive Cardiac Rehab today from 0900 to 1100. During his time he exercised and attended class.   His education today wasa WORKSHOP titled: TARGETING NUTRITIONAL PRIORITIES.  Patient received handouts and class discussion pertaining to the topic.

## 2024-02-05 DIAGNOSIS — I21.4 NSTEMI (NON-ST ELEVATED MYOCARDIAL INFARCTION) (HCC): ICD-10-CM

## 2024-02-05 RX ORDER — PRASUGREL 10 MG/1
10 TABLET, FILM COATED ORAL DAILY
Qty: 90 TABLET | Refills: 2 | Status: SHIPPED | OUTPATIENT
Start: 2024-02-05

## 2024-02-05 RX ORDER — ASPIRIN 81 MG/1
81 TABLET ORAL DAILY
Qty: 100 TABLET | Refills: 2 | Status: SHIPPED | OUTPATIENT
Start: 2024-02-05

## 2024-02-05 RX ORDER — ROSUVASTATIN CALCIUM 20 MG/1
20 TABLET, COATED ORAL EVERY EVENING
Qty: 90 TABLET | Refills: 2 | Status: SHIPPED | OUTPATIENT
Start: 2024-02-05

## 2024-02-05 NOTE — TELEPHONE ENCOUNTER
MEDICATION REFILL : DB    Received request via: Patient    Was the patient seen in the last year in this department? Yes    Does the patient have an active prescription (recently filled or refills available) for medication(s) requested? No    Pharmacy Name:     [id number] - [place], [place] - [address] [facility] [[id number]]     Does the patient have long term Plus and need 100 day supply (blood pressure, diabetes and cholesterol meds only)? Patient does not have SCP

## 2024-02-06 ENCOUNTER — NON-PROVIDER VISIT (OUTPATIENT)
Dept: CARDIOLOGY | Facility: MEDICAL CENTER | Age: 74
End: 2024-02-06
Payer: MEDICARE

## 2024-02-06 DIAGNOSIS — Z95.5 STENTED CORONARY ARTERY: ICD-10-CM

## 2024-02-06 PROCEDURE — G0422 INTENS CARDIAC REHAB W/EXERC: HCPCS | Performed by: INTERNAL MEDICINE

## 2024-02-06 PROCEDURE — G0423 INTENS CARDIAC REHAB NO EXER: HCPCS | Mod: 59 | Performed by: INTERNAL MEDICINE

## 2024-02-06 NOTE — PROGRESS NOTES
Rob Clifton attended Intensive Cardiac Rehab today from 0900 to 1100. During his time he exercised and attended class.   His education today was a video titled: Fueling A Healthy Body. Patient received handouts and class discussion pertaining to the topic.

## 2024-02-07 ENCOUNTER — NON-PROVIDER VISIT (OUTPATIENT)
Dept: CARDIOLOGY | Facility: MEDICAL CENTER | Age: 74
End: 2024-02-07
Payer: MEDICARE

## 2024-02-07 DIAGNOSIS — Z95.5 STENTED CORONARY ARTERY: ICD-10-CM

## 2024-02-07 PROCEDURE — G0423 INTENS CARDIAC REHAB NO EXER: HCPCS | Mod: 59 | Performed by: INTERNAL MEDICINE

## 2024-02-07 PROCEDURE — G0422 INTENS CARDIAC REHAB W/EXERC: HCPCS | Performed by: INTERNAL MEDICINE

## 2024-02-07 NOTE — PROGRESS NOTES
Rob Clifton attended Intensive Cardiac Rehab today from 0900 to 1100. During his time he exercised and attended class.   His education today was a cooking school titled: Tasty Apps and Snacks. Patient received handouts and class discussion pertaining to the topic.

## 2024-02-08 ENCOUNTER — NON-PROVIDER VISIT (OUTPATIENT)
Dept: CARDIOLOGY | Facility: MEDICAL CENTER | Age: 74
End: 2024-02-08
Payer: MEDICARE

## 2024-02-08 DIAGNOSIS — Z95.5 STENTED CORONARY ARTERY: ICD-10-CM

## 2024-02-08 PROCEDURE — G0423 INTENS CARDIAC REHAB NO EXER: HCPCS | Mod: 59 | Performed by: INTERNAL MEDICINE

## 2024-02-08 PROCEDURE — G0422 INTENS CARDIAC REHAB W/EXERC: HCPCS | Performed by: INTERNAL MEDICINE

## 2024-02-08 NOTE — PROGRESS NOTES
Rob Clifton attended Intensive Cardiac Rehab today from 0900 to 1100. During his time he exercised and attended class.   His education today was a workshop titled: Fueling A Healthy Body. Patient received handouts and class discussion pertaining to the topic.

## 2024-02-13 ENCOUNTER — NON-PROVIDER VISIT (OUTPATIENT)
Dept: CARDIOLOGY | Facility: MEDICAL CENTER | Age: 74
End: 2024-02-13
Payer: MEDICARE

## 2024-02-13 DIAGNOSIS — Z95.5 STENTED CORONARY ARTERY: ICD-10-CM

## 2024-02-13 PROCEDURE — G0422 INTENS CARDIAC REHAB W/EXERC: HCPCS | Performed by: INTERNAL MEDICINE

## 2024-02-13 PROCEDURE — G0423 INTENS CARDIAC REHAB NO EXER: HCPCS | Mod: 59 | Performed by: INTERNAL MEDICINE

## 2024-02-13 NOTE — PROGRESS NOTES
Rob Clifton attended Intensive Cardiac Rehab today from 0900 to 1100. During his time he exercised and attended class.   His education today was a video titled: Hypertension and Heart Disease. Patient received handouts and class discussion pertaining to the topic.

## 2024-02-14 ENCOUNTER — NON-PROVIDER VISIT (OUTPATIENT)
Dept: CARDIOLOGY | Facility: MEDICAL CENTER | Age: 74
End: 2024-02-14
Payer: MEDICARE

## 2024-02-14 DIAGNOSIS — Z95.5 STENTED CORONARY ARTERY: ICD-10-CM

## 2024-02-14 PROCEDURE — G0423 INTENS CARDIAC REHAB NO EXER: HCPCS | Mod: 59 | Performed by: INTERNAL MEDICINE

## 2024-02-14 PROCEDURE — G0422 INTENS CARDIAC REHAB W/EXERC: HCPCS | Performed by: INTERNAL MEDICINE

## 2024-02-15 ENCOUNTER — NON-PROVIDER VISIT (OUTPATIENT)
Dept: CARDIOLOGY | Facility: MEDICAL CENTER | Age: 74
End: 2024-02-15
Payer: MEDICARE

## 2024-02-15 DIAGNOSIS — Z95.5 STENTED CORONARY ARTERY: ICD-10-CM

## 2024-02-15 PROCEDURE — G0423 INTENS CARDIAC REHAB NO EXER: HCPCS | Mod: 59 | Performed by: INTERNAL MEDICINE

## 2024-02-15 PROCEDURE — G0422 INTENS CARDIAC REHAB W/EXERC: HCPCS | Performed by: INTERNAL MEDICINE

## 2024-02-15 NOTE — PROGRESS NOTES
Rob Clifton attended Intensive Cardiac Rehab today from 0900 to 1100. During his time he exercised and attended class.   His education today was a WORKSHOP titled: MANAGING HEART DISEASE. Patient received handouts and class discussion pertaining to the topic.

## 2024-02-20 ENCOUNTER — NON-PROVIDER VISIT (OUTPATIENT)
Dept: CARDIOLOGY | Facility: MEDICAL CENTER | Age: 74
End: 2024-02-20
Payer: MEDICARE

## 2024-02-20 DIAGNOSIS — Z95.5 STENTED CORONARY ARTERY: ICD-10-CM

## 2024-02-20 PROCEDURE — G0422 INTENS CARDIAC REHAB W/EXERC: HCPCS | Performed by: INTERNAL MEDICINE

## 2024-02-20 PROCEDURE — G0423 INTENS CARDIAC REHAB NO EXER: HCPCS | Mod: 59 | Performed by: INTERNAL MEDICINE

## 2024-02-20 NOTE — PROGRESS NOTES
Rob Clifton attended Intensive Cardiac Rehab today from 0900 to 1100. During his time he exercised and attended class.   His education today was a video titled: Label Reading. Patient received handouts and class discussion pertaining to the topic.

## 2024-02-21 ENCOUNTER — NON-PROVIDER VISIT (OUTPATIENT)
Dept: CARDIOLOGY | Facility: MEDICAL CENTER | Age: 74
End: 2024-02-21
Payer: MEDICARE

## 2024-02-21 DIAGNOSIS — Z95.5 STENTED CORONARY ARTERY: ICD-10-CM

## 2024-02-21 PROCEDURE — G0423 INTENS CARDIAC REHAB NO EXER: HCPCS | Mod: 59 | Performed by: INTERNAL MEDICINE

## 2024-02-21 PROCEDURE — G0422 INTENS CARDIAC REHAB W/EXERC: HCPCS | Performed by: INTERNAL MEDICINE

## 2024-02-21 NOTE — PROGRESS NOTES
Rob Clifton attended Intensive Cardiac Rehab today from 0900 to 1100. During his time he exercised and attended class.   His education today was a cooking school titled: [employer]. Patient received handouts and class discussion pertaining to the topic.

## 2024-02-22 ENCOUNTER — NON-PROVIDER VISIT (OUTPATIENT)
Dept: CARDIOLOGY | Facility: MEDICAL CENTER | Age: 74
End: 2024-02-22
Payer: MEDICARE

## 2024-02-22 DIAGNOSIS — Z95.5 STENTED CORONARY ARTERY: ICD-10-CM

## 2024-02-22 PROCEDURE — G0423 INTENS CARDIAC REHAB NO EXER: HCPCS | Mod: 59 | Performed by: INTERNAL MEDICINE

## 2024-02-22 PROCEDURE — G0422 INTENS CARDIAC REHAB W/EXERC: HCPCS | Performed by: INTERNAL MEDICINE

## 2024-02-22 NOTE — PROGRESS NOTES
Rob Clifton attended Intensive Cardiac Rehab today from 0900 to 1100. During his time he exercised and attended class.   His education today was a workshop titled: Label Reading. Patient received handouts and class discussion pertaining to the topic.

## 2024-02-23 DIAGNOSIS — N40.1 BENIGN PROSTATIC HYPERPLASIA WITH NOCTURIA: ICD-10-CM

## 2024-02-23 DIAGNOSIS — R35.1 BENIGN PROSTATIC HYPERPLASIA WITH NOCTURIA: ICD-10-CM

## 2024-02-23 RX ORDER — TAMSULOSIN HYDROCHLORIDE 0.4 MG/1
0.8 CAPSULE ORAL DAILY
Qty: 180 CAPSULE | Refills: 3 | Status: SHIPPED | OUTPATIENT
Start: 2024-02-23

## 2024-02-23 NOTE — TELEPHONE ENCOUNTER
Received request via: Patient    Was the patient seen in the last year in this department? Yes    Does the patient have an active prescription (recently filled or refills available) for medication(s) requested? Yes. Prescribed on 01/10/24 for a 90 days supply with 3 refills.  Patient comment: I am currently taking 2 capsules a day. Please refill this prescription. Thank you.    Please advise.    Pharmacy Name: [id number] - [place], [place] - [id number]     Does the patient have correction Plus and need 100 day supply (blood pressure, diabetes and cholesterol meds only)? Patient does not have SCP

## 2024-02-27 ENCOUNTER — NON-PROVIDER VISIT (OUTPATIENT)
Dept: CARDIOLOGY | Facility: MEDICAL CENTER | Age: 74
End: 2024-02-27
Payer: MEDICARE

## 2024-02-27 DIAGNOSIS — Z95.5 STENTED CORONARY ARTERY: ICD-10-CM

## 2024-02-27 PROCEDURE — G0422 INTENS CARDIAC REHAB W/EXERC: HCPCS | Performed by: INTERNAL MEDICINE

## 2024-02-27 PROCEDURE — G0423 INTENS CARDIAC REHAB NO EXER: HCPCS | Mod: 59 | Performed by: INTERNAL MEDICINE

## 2024-02-27 NOTE — PROGRESS NOTES
Rob Clifton attended Intensive Cardiac Rehab today from 0900 to 1100. During his time he exercised and attended class.   His education today was a video titled: Yoga. Patient received handouts and class discussion pertaining to the topic.

## 2024-02-28 ENCOUNTER — APPOINTMENT (OUTPATIENT)
Dept: CARDIOLOGY | Facility: MEDICAL CENTER | Age: 74
End: 2024-02-28
Payer: MEDICARE

## 2024-03-12 ENCOUNTER — NON-PROVIDER VISIT (OUTPATIENT)
Dept: CARDIOLOGY | Facility: MEDICAL CENTER | Age: 74
End: 2024-03-12
Payer: MEDICARE

## 2024-03-12 VITALS
DIASTOLIC BLOOD PRESSURE: 69 MMHG | HEIGHT: 71 IN | WEIGHT: 211 LBS | HEART RATE: 60 BPM | SYSTOLIC BLOOD PRESSURE: 107 MMHG | BODY MASS INDEX: 29.54 KG/M2

## 2024-03-12 DIAGNOSIS — Z95.5 STENTED CORONARY ARTERY: ICD-10-CM

## 2024-03-12 PROCEDURE — G0423 INTENS CARDIAC REHAB NO EXER: HCPCS | Mod: 59 | Performed by: INTERNAL MEDICINE

## 2024-03-12 PROCEDURE — G0422 INTENS CARDIAC REHAB W/EXERC: HCPCS | Performed by: INTERNAL MEDICINE

## 2024-03-12 ASSESSMENT — FIBROSIS 4 INDEX: FIB4 SCORE: 1.44

## 2024-03-12 NOTE — PROGRESS NOTES
Nutrition Consult Note by:    Twila Hines RD, LDN  Patient name: Rob Clifton  Date of visit:  03/12/24     Start Time:0900a End Time: 0955a  Referring MD: Saritha      Referring diagnosis: Stent          Diagnosis code: z95.5    Rob Clifton is here today for Intensive Cardiac Rehab. Today the patient had their one-on-one RD appointment. This program includes Nutrition education and counseling following the Pritikin guidelines, which promote whole foods-fruits, vegetables, beans/legumes, whole grains, reduced saturated fats with lean animal protein and reduced fat dairy-while avoiding added salt, processed foods with excessive added sugars/salt/fat, trans/saturated fat, added sugar, tropical oils and heavy use of added oils.     Past Medical History:   NSTEMI, HLD, CAD, B12 DEF, RECURRENT KIDNEY STONES, DIVERTICULITIS    Nutrition-related Medications/Supplements:  ASA, MELOXICAM, PRASUGREL, CRESTOR, FLOMAX, METOPROLOL  OTC SLEEP AID, LUTEIN    NUTRITION ASSESSMENT    Anthropometrics:  Male/Female:  MALE    Age: 72 YO  Height: 71IN     Weight: 211#       BMI: 29.43                BMI Class: overweight  Goal weight: 195#    Protein Needs 89g per day       ++BMI is NOT an accurate assessment tool for health++    Nutrition-Related Labs:  Date: 03/12/24           BP: 107/69 Heart Rate: 60               Date: 12/04/23  Glucose: 98    Date: 11/13/23  HbA1c: 5.6                                                                                                                    Date: 11/13/23   Total Cholesterol 102, prev 127                                                                                           Triglycerides: 102, 82                                                                                                           HDL: 42, 51                                                                                           LDL: 40, 60                                                                                                     Date: 12/04/23      eGFR:  78, 64    Calcium:    8.8        Vit D:     PO4:       B12:  1561     Potassium:  4.2    Other :      Digestive s/s:    none reported     Social History:    Current tobacco use (Y/N): n    ETOH (Y/N): y        If yes, frequency & variety: 1-2x day (wine/rum)   Typical hours of sleep per night: 9   Primary Food  and/or preparer in the household: spouse   Level of social/familial support? Very good     Diet Recall and Relevant Hx:  Patient and spouse follow Weight Watchers program for 5 years     Breakfast: 2 eggs (HB or omelet) w/WW toast or Low carb tortilla and banana    Lunch: Johnathon Bowl or [place] of subs (ham/turkey/provolone) diet coke     Dinner: Salad 4-5x week, salmon, chicken, homemade turkey chili, roasted veggies, corn, shrimp, scallops, pork chop 1x week, steak 1x month; sweet potato or butternut squash    Snacks: pistachios or raw almonds, popcorn, pretzels, veggie stix chips,     Beverages: water 24oz, diet coke 12-20oz, cran/pom Low sugar 8oz,      Diet change recommendations: add whole grains, reduce sodium intake, add veggies to omelet    Current Exercise:  45-60mins at [facility] x3 days per week   OTHER: 2x week weight resistance    Physical Activity:            Light (1-3x /wk)         Positive Changes Since Beginning the Program:   1. Improved Stamina    Barriers to Change/Biggest Struggles:  1. None reported      Nutrition SMART Goals:  1. Water intake Recommended: 97oz daily   2. Whole Grains/beans/legumes/seeds/starchy vegetables at 1/2 cup servings 5x day   3. Sodium intake 1500-1800mg daily    Educational Handouts Provide  Pritikin Eating Plan w/ Healthy Plate method     Benefits of drinking water   Food Sources High in Omega 3 FA          Food Sources high in Fiber         Heart healthy snack ideas 20 ways to increase fruit & vegetable intake    Protein Content of common foods   Portion Sizes    Nutrition Diagnosis:    Problem:  Nutrition-related knowledge deficit    Related to: cardiac diet      As Evidenced by: diet recall     Follow Up PRN

## 2024-03-12 NOTE — PROGRESS NOTES
Rob Clifton attended Intensive Cardiac Rehab today from 0900 to 1100. During his time he exercised and attended a one on one with the RD.   His education today was a one on one with the RD. Patient received handouts and class discussion pertaining to the topic.

## 2024-03-13 ENCOUNTER — NON-PROVIDER VISIT (OUTPATIENT)
Dept: CARDIOLOGY | Facility: MEDICAL CENTER | Age: 74
End: 2024-03-13
Payer: MEDICARE

## 2024-03-13 DIAGNOSIS — Z95.5 STENTED CORONARY ARTERY: ICD-10-CM

## 2024-03-13 PROCEDURE — G0422 INTENS CARDIAC REHAB W/EXERC: HCPCS | Performed by: INTERNAL MEDICINE

## 2024-03-13 PROCEDURE — G0423 INTENS CARDIAC REHAB NO EXER: HCPCS | Mod: 59 | Performed by: INTERNAL MEDICINE

## 2024-03-13 NOTE — PROGRESS NOTES
Rob Clifton attended Intensive Cardiac Rehab today from 0900 to 1100. During his time he exercised and attended class.   His education today was a cooking school titled: Meals In A Snap. Patient received handouts and class discussion pertaining to the topic.

## 2024-03-14 ENCOUNTER — NON-PROVIDER VISIT (OUTPATIENT)
Dept: CARDIOLOGY | Facility: MEDICAL CENTER | Age: 74
End: 2024-03-14
Payer: MEDICARE

## 2024-03-14 DIAGNOSIS — Z95.5 STENTED CORONARY ARTERY: ICD-10-CM

## 2024-03-14 PROCEDURE — G0423 INTENS CARDIAC REHAB NO EXER: HCPCS | Mod: 59 | Performed by: INTERNAL MEDICINE

## 2024-03-14 PROCEDURE — G0422 INTENS CARDIAC REHAB W/EXERC: HCPCS | Performed by: INTERNAL MEDICINE

## 2024-03-14 NOTE — NON-PROVIDER
Rob Clifton attended Intensive Cardiac Rehab today from 0900am to 1100am. During his time he exercised and attended class.   His education today was a video titled: Decoding Lab Results. Patient received handouts and class discussion pertaining to the topic.

## 2024-03-19 ENCOUNTER — NON-PROVIDER VISIT (OUTPATIENT)
Dept: CARDIOLOGY | Facility: MEDICAL CENTER | Age: 74
End: 2024-03-19
Payer: MEDICARE

## 2024-03-19 DIAGNOSIS — Z95.5 STENTED CORONARY ARTERY: ICD-10-CM

## 2024-03-19 PROCEDURE — G0423 INTENS CARDIAC REHAB NO EXER: HCPCS | Mod: 59 | Performed by: INTERNAL MEDICINE

## 2024-03-19 PROCEDURE — G0422 INTENS CARDIAC REHAB W/EXERC: HCPCS | Performed by: INTERNAL MEDICINE

## 2024-03-19 NOTE — PROGRESS NOTES
Rob Clifton attended Intensive Cardiac Rehab today from 0900 to 1100. During his time he exercised and attended class.   His education today was a workshp titled: New Thoughts, New Behaviors. Patient received handouts and class discussion pertaining to the topic.

## 2024-03-20 ENCOUNTER — NON-PROVIDER VISIT (OUTPATIENT)
Dept: CARDIOLOGY | Facility: MEDICAL CENTER | Age: 74
End: 2024-03-20
Payer: MEDICARE

## 2024-03-20 DIAGNOSIS — Z95.5 STENTED CORONARY ARTERY: ICD-10-CM

## 2024-03-20 PROCEDURE — G0422 INTENS CARDIAC REHAB W/EXERC: HCPCS | Performed by: INTERNAL MEDICINE

## 2024-03-20 PROCEDURE — G0423 INTENS CARDIAC REHAB NO EXER: HCPCS | Mod: 59 | Performed by: INTERNAL MEDICINE

## 2024-03-20 NOTE — PROGRESS NOTES
Rob Clifton attended Intensive Cardiac Rehab today from 0900 to 1100. During his time he exercised and attended class.   His education today was a nutrition and cooking class titled: One Pot Wonders. Patient received handouts and class discussion pertaining to the topic.

## 2024-03-21 ENCOUNTER — NON-PROVIDER VISIT (OUTPATIENT)
Dept: CARDIOLOGY | Facility: MEDICAL CENTER | Age: 74
End: 2024-03-21
Payer: MEDICARE

## 2024-03-21 DIAGNOSIS — Z95.5 STENTED CORONARY ARTERY: ICD-10-CM

## 2024-03-21 PROCEDURE — G0422 INTENS CARDIAC REHAB W/EXERC: HCPCS | Performed by: INTERNAL MEDICINE

## 2024-03-21 PROCEDURE — G0423 INTENS CARDIAC REHAB NO EXER: HCPCS | Mod: 59 | Performed by: INTERNAL MEDICINE

## 2024-03-21 NOTE — PROGRESS NOTES
Rob Clifton attended Intensive Cardiac Rehab today from 0900 to 1100. During his time he exercised and attended class.   His education today was a workshop titled: Mindful Eating. Patient received handouts and class discussion pertaining to the topic.

## 2024-03-26 ENCOUNTER — NON-PROVIDER VISIT (OUTPATIENT)
Dept: CARDIOLOGY | Facility: MEDICAL CENTER | Age: 74
End: 2024-03-26
Payer: MEDICARE

## 2024-03-26 DIAGNOSIS — Z95.5 STENTED CORONARY ARTERY: ICD-10-CM

## 2024-03-26 NOTE — PROGRESS NOTES
Rob Clifton attended Intensive Cardiac Rehab today from 0900 to 1100. During his time he exercised and attended class.   His education today was a video titled: Improve Your Perfomance. Patient received handouts and class discussion pertaining to the topic.

## 2024-03-27 ENCOUNTER — NON-PROVIDER VISIT (OUTPATIENT)
Dept: CARDIOLOGY | Facility: MEDICAL CENTER | Age: 74
End: 2024-03-27
Payer: MEDICARE

## 2024-03-27 DIAGNOSIS — Z95.5 STENTED CORONARY ARTERY: ICD-10-CM

## 2024-03-27 NOTE — PROGRESS NOTES
Rob Clifton attended Intensive Cardiac Rehab today from 0900 to 1100. During his time he exercised and attended class.   His education today was a cooking school titled: Seasonings. Patient received handouts and class discussion pertaining to the topic.

## 2024-03-28 ENCOUNTER — NON-PROVIDER VISIT (OUTPATIENT)
Dept: CARDIOLOGY | Facility: MEDICAL CENTER | Age: 74
End: 2024-03-28
Payer: MEDICARE

## 2024-03-28 DIAGNOSIS — Z95.5 STENTED CORONARY ARTERY: ICD-10-CM

## 2024-04-02 ENCOUNTER — NON-PROVIDER VISIT (OUTPATIENT)
Dept: CARDIOLOGY | Facility: MEDICAL CENTER | Age: 74
End: 2024-04-02
Payer: MEDICARE

## 2024-04-02 DIAGNOSIS — Z95.5 STENTED CORONARY ARTERY: ICD-10-CM

## 2024-04-02 PROCEDURE — G0422 INTENS CARDIAC REHAB W/EXERC: HCPCS | Performed by: INTERNAL MEDICINE

## 2024-04-02 PROCEDURE — G0423 INTENS CARDIAC REHAB NO EXER: HCPCS | Mod: 59 | Performed by: INTERNAL MEDICINE

## 2024-04-02 NOTE — PROGRESS NOTES
Rob Clifton attended Intensive Cardiac Rehab today from 0900 to 1100. During his time he exercised and attended class.   His education today was a video titled: Metabolic Syndrome and Belly Fat. Patient received handouts and class discussion pertaining to the topic.

## 2024-04-03 ENCOUNTER — NON-PROVIDER VISIT (OUTPATIENT)
Dept: CARDIOLOGY | Facility: MEDICAL CENTER | Age: 74
End: 2024-04-03
Payer: MEDICARE

## 2024-04-03 DIAGNOSIS — Z95.5 STENTED CORONARY ARTERY: ICD-10-CM

## 2024-04-03 PROCEDURE — G0423 INTENS CARDIAC REHAB NO EXER: HCPCS | Mod: 59 | Performed by: INTERNAL MEDICINE

## 2024-04-03 PROCEDURE — G0422 INTENS CARDIAC REHAB W/EXERC: HCPCS | Performed by: INTERNAL MEDICINE

## 2024-04-03 NOTE — PROGRESS NOTES
Rob Clifton attended Intensive Cardiac Rehab today from 0900 to 1100. During his time he exercised and attended class.   His education today was a cooking school titled: Fast and Healthy Breakfasts. Patient received handouts and class discussion pertaining to the topic.

## 2024-04-04 ENCOUNTER — NON-PROVIDER VISIT (OUTPATIENT)
Dept: CARDIOLOGY | Facility: MEDICAL CENTER | Age: 74
End: 2024-04-04
Payer: MEDICARE

## 2024-04-04 DIAGNOSIS — N52.8 OTHER MALE ERECTILE DYSFUNCTION: ICD-10-CM

## 2024-04-04 DIAGNOSIS — Z95.5 STENTED CORONARY ARTERY: ICD-10-CM

## 2024-04-04 PROCEDURE — G0423 INTENS CARDIAC REHAB NO EXER: HCPCS | Mod: 59 | Performed by: INTERNAL MEDICINE

## 2024-04-04 PROCEDURE — G0422 INTENS CARDIAC REHAB W/EXERC: HCPCS | Performed by: INTERNAL MEDICINE

## 2024-04-04 RX ORDER — SILDENAFIL 100 MG/1
100 TABLET, FILM COATED ORAL
Qty: 30 TABLET | Refills: 0 | Status: SHIPPED | OUTPATIENT
Start: 2024-04-04 | End: 2024-07-13

## 2024-04-04 NOTE — PROGRESS NOTES
Rob Clifton attended Intensive Cardiac Rehab today from 0900 to 1100. During his time he exercised and attended class.   His education today was a workshop titled: Ernesto Pani[place]. Patient received handouts and class discussion pertaining to the topic.

## 2024-04-04 NOTE — TELEPHONE ENCOUNTER
Received request via: Patient    Was the patient seen in the last year in this department? Yes    Does the patient have an active prescription (recently filled or refills available) for medication(s) requested? No    Pharmacy Name:     Does the patient have half-way Plus and need 100 day supply (blood pressure, diabetes and cholesterol meds only)? Patient does not have SCP

## 2024-04-09 ENCOUNTER — NON-PROVIDER VISIT (OUTPATIENT)
Dept: CARDIOLOGY | Facility: MEDICAL CENTER | Age: 74
End: 2024-04-09
Payer: MEDICARE

## 2024-04-09 DIAGNOSIS — Z95.5 STENTED CORONARY ARTERY: ICD-10-CM

## 2024-04-09 PROCEDURE — G0423 INTENS CARDIAC REHAB NO EXER: HCPCS | Mod: 59 | Performed by: INTERNAL MEDICINE

## 2024-04-09 PROCEDURE — G0422 INTENS CARDIAC REHAB W/EXERC: HCPCS | Performed by: INTERNAL MEDICINE

## 2024-04-09 NOTE — PROGRESS NOTES
Rob Clifton attended Intensive Cardiac Rehab today from 0900 to 1100. During his time he exercised and attended class.   His education today was a video titled: How Our Thoughts Heal Our Hearts. Patient received handouts and class discussion pertaining to the topic.

## 2024-04-10 ENCOUNTER — NON-PROVIDER VISIT (OUTPATIENT)
Dept: CARDIOLOGY | Facility: MEDICAL CENTER | Age: 74
End: 2024-04-10
Payer: MEDICARE

## 2024-04-10 DIAGNOSIS — I21.4 NSTEMI (NON-ST ELEVATED MYOCARDIAL INFARCTION) (HCC): ICD-10-CM

## 2024-04-10 DIAGNOSIS — Z95.5 STENTED CORONARY ARTERY: ICD-10-CM

## 2024-04-10 PROCEDURE — G0423 INTENS CARDIAC REHAB NO EXER: HCPCS | Mod: 59 | Performed by: INTERNAL MEDICINE

## 2024-04-10 PROCEDURE — G0422 INTENS CARDIAC REHAB W/EXERC: HCPCS | Performed by: INTERNAL MEDICINE

## 2024-04-10 NOTE — TELEPHONE ENCOUNTER
DB  Received request via: Patient    Was the patient seen in the last year in this department? Yes 1/5/24    Does the patient have an active prescription (recently filled or refills available) for medication(s) requested? Yes    Pharmacy Name: [id number] - [place], [place] - [id number] [[id number]]     Does the patient have snf Plus and need 100 day supply (blood pressure, diabetes and cholesterol meds only)? Patient does not have SCP    Thank you  Erin A

## 2024-04-11 ENCOUNTER — NON-PROVIDER VISIT (OUTPATIENT)
Dept: CARDIOLOGY | Facility: MEDICAL CENTER | Age: 74
End: 2024-04-11
Payer: MEDICARE

## 2024-04-11 DIAGNOSIS — Z95.5 STENTED CORONARY ARTERY: ICD-10-CM

## 2024-04-11 PROCEDURE — G0422 INTENS CARDIAC REHAB W/EXERC: HCPCS | Performed by: INTERNAL MEDICINE

## 2024-04-11 PROCEDURE — G0423 INTENS CARDIAC REHAB NO EXER: HCPCS | Mod: 59 | Performed by: INTERNAL MEDICINE

## 2024-04-11 NOTE — TELEPHONE ENCOUNTER
Upon chart review medication was sent 90 tab 2 refills sent to [facility] 1/15/2024 with receipt confirmation it was received by pharmacy 1/15/24 at 1503.  Medication refused.

## 2024-04-11 NOTE — PROGRESS NOTES
Rob Clifton attended Intensive Cardiac Rehab today from 0900 to 1100. During his time he exercised and attended class.   His education today was a WORKSHOP titled: REDUCING STRESS. Patient received handouts and class discussion pertaining to the topic.

## 2024-04-16 ENCOUNTER — NON-PROVIDER VISIT (OUTPATIENT)
Dept: CARDIOLOGY | Facility: MEDICAL CENTER | Age: 74
End: 2024-04-16
Payer: MEDICARE

## 2024-04-16 DIAGNOSIS — Z95.5 STENTED CORONARY ARTERY: ICD-10-CM

## 2024-04-16 PROCEDURE — G0423 INTENS CARDIAC REHAB NO EXER: HCPCS | Mod: 59 | Performed by: INTERNAL MEDICINE

## 2024-04-16 PROCEDURE — G0422 INTENS CARDIAC REHAB W/EXERC: HCPCS | Performed by: INTERNAL MEDICINE

## 2024-04-16 NOTE — PROGRESS NOTES
Rob Clifton attended Intensive Cardiac Rehab today from 0900 to 1100. During his time he exercised and attended class.   His education today was a video titled: TARGETING YOUR NUTRITIONAL PRIORITIES. Patient received handouts and class discussion pertaining to the topic.

## 2024-04-17 ENCOUNTER — APPOINTMENT (OUTPATIENT)
Dept: CARDIOLOGY | Facility: MEDICAL CENTER | Age: 74
End: 2024-04-17
Payer: MEDICARE

## 2024-04-20 ASSESSMENT — ENCOUNTER SYMPTOMS
DIZZINESS: 0
PALPITATIONS: 0
LOSS OF CONSCIOUSNESS: 0
SHORTNESS OF BREATH: 0
ORTHOPNEA: 0
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
## 2024-04-22 DIAGNOSIS — M79.642 PAIN IN BOTH HANDS: ICD-10-CM

## 2024-04-22 DIAGNOSIS — M79.641 PAIN IN BOTH HANDS: ICD-10-CM

## 2024-04-22 RX ORDER — MELOXICAM 15 MG/1
15 TABLET ORAL DAILY
Qty: 90 TABLET | Refills: 2 | Status: SHIPPED | OUTPATIENT
Start: 2024-04-22

## 2024-04-22 NOTE — TELEPHONE ENCOUNTER
Received request via: Pharmacy    Was the patient seen in the last year in this department? Yes    Does the patient have an active prescription (recently filled or refills available) for medication(s) requested? No    [facility]    Does the patient have long-term Plus and need 100 day supply (blood pressure, diabetes and cholesterol meds only)? Patient does not have SCP

## 2024-04-26 ENCOUNTER — OFFICE VISIT (OUTPATIENT)
Dept: CARDIOLOGY | Facility: MEDICAL CENTER | Age: 74
End: 2024-04-26
Attending: NURSE PRACTITIONER
Payer: MEDICARE

## 2024-04-26 VITALS
DIASTOLIC BLOOD PRESSURE: 71 MMHG | HEART RATE: 66 BPM | BODY MASS INDEX: 28.7 KG/M2 | OXYGEN SATURATION: 98 % | SYSTOLIC BLOOD PRESSURE: 119 MMHG | RESPIRATION RATE: 15 BRPM | HEIGHT: 71 IN | WEIGHT: 205 LBS

## 2024-04-26 DIAGNOSIS — I25.10 CORONARY ARTERY DISEASE INVOLVING NATIVE HEART WITHOUT ANGINA PECTORIS, UNSPECIFIED VESSEL OR LESION TYPE: ICD-10-CM

## 2024-04-26 PROCEDURE — 99212 OFFICE O/P EST SF 10 MIN: CPT | Performed by: NURSE PRACTITIONER

## 2024-04-26 RX ORDER — METOPROLOL SUCCINATE 25 MG/1
25 TABLET, EXTENDED RELEASE ORAL DAILY
Qty: 90 TABLET | Refills: 3 | Status: SHIPPED | OUTPATIENT
Start: 2024-04-26

## 2024-04-26 ASSESSMENT — FIBROSIS 4 INDEX: FIB4 SCORE: 1.44
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
## 2024-05-21 ENCOUNTER — PATIENT MESSAGE (OUTPATIENT)
Dept: CARDIOLOGY | Facility: MEDICAL CENTER | Age: 74
End: 2024-05-21
Payer: MEDICARE

## 2024-05-21 DIAGNOSIS — I25.10 CORONARY ARTERY DISEASE, OCCLUSIVE: ICD-10-CM

## 2024-05-31 DIAGNOSIS — R35.1 BENIGN PROSTATIC HYPERPLASIA WITH NOCTURIA: ICD-10-CM

## 2024-05-31 DIAGNOSIS — N40.1 BENIGN PROSTATIC HYPERPLASIA WITH NOCTURIA: ICD-10-CM

## 2024-05-31 RX ORDER — TAMSULOSIN HYDROCHLORIDE 0.4 MG/1
0.8 CAPSULE ORAL DAILY
Qty: 180 CAPSULE | Refills: 0 | Status: SHIPPED | OUTPATIENT
Start: 2024-05-31

## 2024-05-31 NOTE — TELEPHONE ENCOUNTER
Received request via: Patient    Was the patient seen in the last year in this department? Yes    Does the patient have an active prescription (recently filled or refills available) for medication(s) requested? No    Pharmacy Name: [id number] - [place], [place] - [address]    Does the patient have jail Plus and need 100 day supply (blood pressure, diabetes and cholesterol meds only)? Patient does not have SCP

## 2024-06-03 ENCOUNTER — APPOINTMENT (RX ONLY)
Dept: URBAN - METROPOLITAN AREA CLINIC 4 | Facility: CLINIC | Age: 74
Setting detail: DERMATOLOGY
End: 2024-06-03

## 2024-06-03 DIAGNOSIS — L82.1 OTHER SEBORRHEIC KERATOSIS: ICD-10-CM

## 2024-06-03 DIAGNOSIS — D22 MELANOCYTIC NEVI: ICD-10-CM

## 2024-06-03 DIAGNOSIS — Z71.89 OTHER SPECIFIED COUNSELING: ICD-10-CM

## 2024-06-03 DIAGNOSIS — L81.4 OTHER MELANIN HYPERPIGMENTATION: ICD-10-CM

## 2024-06-03 DIAGNOSIS — D18.0 HEMANGIOMA: ICD-10-CM

## 2024-06-03 PROBLEM — D23.22 OTHER BENIGN NEOPLASM OF SKIN OF LEFT EAR AND EXTERNAL AURICULAR CANAL: Status: ACTIVE | Noted: 2024-06-03

## 2024-06-03 PROBLEM — D22.62 MELANOCYTIC NEVI OF LEFT UPPER LIMB, INCLUDING SHOULDER: Status: ACTIVE | Noted: 2024-06-03

## 2024-06-03 PROBLEM — D22.61 MELANOCYTIC NEVI OF RIGHT UPPER LIMB, INCLUDING SHOULDER: Status: ACTIVE | Noted: 2024-06-03

## 2024-06-03 PROBLEM — D18.01 HEMANGIOMA OF SKIN AND SUBCUTANEOUS TISSUE: Status: ACTIVE | Noted: 2024-06-03

## 2024-06-03 PROBLEM — D23.21 OTHER BENIGN NEOPLASM OF SKIN OF RIGHT EAR AND EXTERNAL AURICULAR CANAL: Status: ACTIVE | Noted: 2024-06-03

## 2024-06-03 PROCEDURE — ? SUNSCREEN RECOMMENDATIONS

## 2024-06-03 PROCEDURE — 99213 OFFICE O/P EST LOW 20 MIN: CPT

## 2024-06-03 PROCEDURE — ? COUNSELING

## 2024-06-03 ASSESSMENT — LOCATION ZONE DERM
LOCATION ZONE: FACE
LOCATION ZONE: TRUNK
LOCATION ZONE: ARM

## 2024-06-03 ASSESSMENT — LOCATION SIMPLE DESCRIPTION DERM
LOCATION SIMPLE: LEFT CHEEK
LOCATION SIMPLE: LEFT FOREARM
LOCATION SIMPLE: RIGHT FOREARM
LOCATION SIMPLE: RIGHT UPPER ARM
LOCATION SIMPLE: LEFT UPPER ARM
LOCATION SIMPLE: CHEST
LOCATION SIMPLE: ABDOMEN
LOCATION SIMPLE: UPPER BACK

## 2024-06-03 ASSESSMENT — LOCATION DETAILED DESCRIPTION DERM
LOCATION DETAILED: RIGHT PROXIMAL DORSAL FOREARM
LOCATION DETAILED: SUPERIOR THORACIC SPINE
LOCATION DETAILED: RIGHT ANTERIOR PROXIMAL UPPER ARM
LOCATION DETAILED: LEFT DISTAL DORSAL FOREARM
LOCATION DETAILED: EPIGASTRIC SKIN
LOCATION DETAILED: STERNUM
LOCATION DETAILED: LEFT ANTERIOR PROXIMAL UPPER ARM
LOCATION DETAILED: LEFT CENTRAL MALAR CHEEK
LOCATION DETAILED: LEFT SUPERIOR LATERAL MALAR CHEEK

## 2024-07-08 DIAGNOSIS — N52.8 OTHER MALE ERECTILE DYSFUNCTION: ICD-10-CM

## 2024-07-09 RX ORDER — SILDENAFIL 100 MG/1
100 TABLET, FILM COATED ORAL
Qty: 30 TABLET | Refills: 0 | Status: SHIPPED | OUTPATIENT
Start: 2024-07-09 | End: 2024-10-17

## 2024-07-10 ENCOUNTER — APPOINTMENT (OUTPATIENT)
Dept: MEDICAL GROUP | Facility: PHYSICIAN GROUP | Age: 74
End: 2024-07-10
Payer: MEDICARE

## 2024-07-10 VITALS
SYSTOLIC BLOOD PRESSURE: 110 MMHG | BODY MASS INDEX: 29.34 KG/M2 | HEART RATE: 64 BPM | OXYGEN SATURATION: 96 % | WEIGHT: 209.6 LBS | HEIGHT: 71 IN | DIASTOLIC BLOOD PRESSURE: 60 MMHG | TEMPERATURE: 97.7 F

## 2024-07-10 DIAGNOSIS — Z00.00 ENCOUNTER FOR MEDICARE ANNUAL WELLNESS EXAM: Primary | ICD-10-CM

## 2024-07-10 DIAGNOSIS — N20.0 RECURRENT KIDNEY STONES: ICD-10-CM

## 2024-07-10 DIAGNOSIS — E78.00 PURE HYPERCHOLESTEROLEMIA: ICD-10-CM

## 2024-07-10 DIAGNOSIS — Z00.00 WELLNESS EXAMINATION: ICD-10-CM

## 2024-07-10 DIAGNOSIS — I21.4 NSTEMI (NON-ST ELEVATED MYOCARDIAL INFARCTION) (HCC): ICD-10-CM

## 2024-07-10 DIAGNOSIS — R35.1 BENIGN PROSTATIC HYPERPLASIA WITH NOCTURIA: ICD-10-CM

## 2024-07-10 DIAGNOSIS — R20.0 LEFT ARM NUMBNESS: ICD-10-CM

## 2024-07-10 DIAGNOSIS — N40.1 BENIGN PROSTATIC HYPERPLASIA WITH NOCTURIA: ICD-10-CM

## 2024-07-10 DIAGNOSIS — I70.0 ARTERIOSCLEROSIS OF AORTA (HCC): ICD-10-CM

## 2024-07-10 DIAGNOSIS — M19.90 ARTHRITIS: ICD-10-CM

## 2024-07-10 DIAGNOSIS — Z12.5 ENCOUNTER FOR SCREENING FOR MALIGNANT NEOPLASM OF PROSTATE: ICD-10-CM

## 2024-07-10 PROBLEM — G44.209 TENSION HEADACHE: Status: RESOLVED | Noted: 2023-07-20 | Resolved: 2024-07-10

## 2024-07-10 PROBLEM — I20.89 ANGINA OF EFFORT (HCC): Status: RESOLVED | Noted: 2023-12-07 | Resolved: 2024-07-10

## 2024-07-10 PROBLEM — M79.671 BILATERAL FOOT PAIN: Status: RESOLVED | Noted: 2022-03-11 | Resolved: 2024-07-10

## 2024-07-10 PROBLEM — R55 SYNCOPE: Status: RESOLVED | Noted: 2021-12-15 | Resolved: 2024-07-10

## 2024-07-10 PROBLEM — M75.41 SUBACROMIAL IMPINGEMENT OF RIGHT SHOULDER: Status: RESOLVED | Noted: 2022-07-28 | Resolved: 2024-07-10

## 2024-07-10 PROBLEM — S46.011A TRAUMATIC TEAR OF RIGHT ROTATOR CUFF, INITIAL ENCOUNTER: Status: RESOLVED | Noted: 2022-07-28 | Resolved: 2024-07-10

## 2024-07-10 PROBLEM — M75.101 ROTATOR CUFF TEAR, RIGHT: Status: RESOLVED | Noted: 2022-08-03 | Resolved: 2024-07-10

## 2024-07-10 PROBLEM — M75.21 BICEPS TENDONITIS ON RIGHT: Status: RESOLVED | Noted: 2022-07-28 | Resolved: 2024-07-10

## 2024-07-10 PROBLEM — M79.672 BILATERAL FOOT PAIN: Status: RESOLVED | Noted: 2022-03-11 | Resolved: 2024-07-10

## 2024-07-10 PROCEDURE — 99213 OFFICE O/P EST LOW 20 MIN: CPT | Mod: 25

## 2024-07-10 PROCEDURE — G0439 PPPS, SUBSEQ VISIT: HCPCS

## 2024-07-10 ASSESSMENT — PATIENT HEALTH QUESTIONNAIRE - PHQ9: CLINICAL INTERPRETATION OF PHQ2 SCORE: 0

## 2024-07-10 ASSESSMENT — ACTIVITIES OF DAILY LIVING (ADL): BATHING_REQUIRES_ASSISTANCE: 0

## 2024-07-10 ASSESSMENT — ENCOUNTER SYMPTOMS: GENERAL WELL-BEING: GOOD

## 2024-07-10 ASSESSMENT — FIBROSIS 4 INDEX: FIB4 SCORE: 1.44

## 2024-07-15 ENCOUNTER — APPOINTMENT (OUTPATIENT)
Dept: RADIOLOGY | Facility: MEDICAL CENTER | Age: 74
End: 2024-07-15
Payer: MEDICARE

## 2024-07-15 DIAGNOSIS — R20.0 LEFT ARM NUMBNESS: ICD-10-CM

## 2024-07-15 PROCEDURE — 72040 X-RAY EXAM NECK SPINE 2-3 VW: CPT

## 2024-07-22 DIAGNOSIS — M79.641 PAIN IN BOTH HANDS: ICD-10-CM

## 2024-07-22 DIAGNOSIS — M79.642 PAIN IN BOTH HANDS: ICD-10-CM

## 2024-07-22 RX ORDER — MELOXICAM 15 MG/1
15 TABLET ORAL DAILY
Qty: 90 TABLET | Refills: 0 | Status: SHIPPED | OUTPATIENT
Start: 2024-07-22

## 2024-08-06 ENCOUNTER — TELEPHONE (OUTPATIENT)
Dept: CARDIOLOGY | Facility: MEDICAL CENTER | Age: 74
End: 2024-08-06
Payer: MEDICARE

## 2024-08-06 DIAGNOSIS — I21.4 NSTEMI (NON-ST ELEVATED MYOCARDIAL INFARCTION) (HCC): ICD-10-CM

## 2024-08-06 RX ORDER — PRASUGREL 10 MG/1
10 TABLET, FILM COATED ORAL DAILY
Qty: 90 TABLET | Refills: 2 | OUTPATIENT
Start: 2024-08-06

## 2024-08-06 NOTE — TELEPHONE ENCOUNTER
Phone Number Called: [phone]    Call outcome: Did not leave a detailed message. Requested patient to call back.    Message: Called to inform patient that he has refills of effient still available. Patient did not answer. Left  for a call back.

## 2024-08-06 NOTE — TELEPHONE ENCOUNTER
DB     Received request via: Patient    Was the patient seen in the last year in this department? Yes    Does the patient have an active prescription (recently filled or refills available) for medication(s) requested?  yes    Pharmacy Name:  [facility] [address]    Does the patient have nursing home Plus and need 100 day supply (blood pressure, diabetes and cholesterol meds only)? Patient does not have SCP

## 2024-08-27 ENCOUNTER — TELEPHONE (OUTPATIENT)
Dept: MEDICAL GROUP | Facility: PHYSICIAN GROUP | Age: 74
End: 2024-08-27

## 2024-08-27 DIAGNOSIS — M25.551 RIGHT HIP PAIN: ICD-10-CM

## 2024-08-27 DIAGNOSIS — M19.90 ARTHRITIS: ICD-10-CM

## 2024-08-27 DIAGNOSIS — R20.0 LEFT ARM NUMBNESS: ICD-10-CM

## 2024-08-27 NOTE — TELEPHONE ENCOUNTER
The patient Is here today to ask for a referral to [facility]. He just left there and they want a referral as soon as possible. Plan of Care.Please call the patient with any information. Thank you  [phone]

## 2024-08-30 DIAGNOSIS — N40.1 BENIGN PROSTATIC HYPERPLASIA WITH NOCTURIA: ICD-10-CM

## 2024-08-30 DIAGNOSIS — R35.1 BENIGN PROSTATIC HYPERPLASIA WITH NOCTURIA: ICD-10-CM

## 2024-08-30 NOTE — TELEPHONE ENCOUNTER
Received request via: Pharmacy    Was the patient seen in the last year in this department? Yes    Does the patient have an active prescription (recently filled or refills available) for medication(s) requested? No    Pharmacy Name:     Does the patient have half-way Plus and need 100-day supply? (This applies to ALL medications) Patient does not have SCP

## 2024-08-31 RX ORDER — TAMSULOSIN HYDROCHLORIDE 0.4 MG/1
0.8 CAPSULE ORAL DAILY
Qty: 180 CAPSULE | Refills: 3 | Status: SHIPPED | OUTPATIENT
Start: 2024-08-31

## 2024-10-07 ENCOUNTER — APPOINTMENT (OUTPATIENT)
Dept: MEDICAL GROUP | Facility: PHYSICIAN GROUP | Age: 74
End: 2024-10-07
Payer: MEDICARE

## 2024-10-21 DIAGNOSIS — N52.8 OTHER MALE ERECTILE DYSFUNCTION: ICD-10-CM

## 2024-10-21 RX ORDER — SILDENAFIL 100 MG/1
100 TABLET, FILM COATED ORAL
Qty: 30 TABLET | Refills: 3 | Status: SHIPPED | OUTPATIENT
Start: 2024-10-21 | End: 2025-01-29

## 2024-10-23 DIAGNOSIS — M79.642 PAIN IN BOTH HANDS: ICD-10-CM

## 2024-10-23 DIAGNOSIS — M79.641 PAIN IN BOTH HANDS: ICD-10-CM

## 2024-10-23 RX ORDER — MELOXICAM 15 MG/1
15 TABLET ORAL DAILY
Qty: 90 TABLET | Refills: 2 | Status: SHIPPED | OUTPATIENT
Start: 2024-10-23

## 2024-11-04 DIAGNOSIS — I25.10 CORONARY ARTERY DISEASE INVOLVING NATIVE HEART WITHOUT ANGINA PECTORIS, UNSPECIFIED VESSEL OR LESION TYPE: ICD-10-CM

## 2024-11-04 DIAGNOSIS — I21.4 NSTEMI (NON-ST ELEVATED MYOCARDIAL INFARCTION) (HCC): ICD-10-CM

## 2024-11-04 RX ORDER — ROSUVASTATIN CALCIUM 20 MG/1
20 TABLET, COATED ORAL EVERY EVENING
Qty: 90 TABLET | Refills: 2 | Status: SHIPPED
Start: 2024-11-04 | End: 2024-11-19

## 2024-11-04 RX ORDER — METOPROLOL SUCCINATE 25 MG/1
25 TABLET, EXTENDED RELEASE ORAL DAILY
Qty: 90 TABLET | Refills: 3 | Status: CANCELLED | OUTPATIENT
Start: 2024-11-04

## 2024-11-04 RX ORDER — PRASUGREL 10 MG/1
10 TABLET, FILM COATED ORAL DAILY
Qty: 90 TABLET | Refills: 2 | Status: SHIPPED
Start: 2024-11-04 | End: 2024-11-19

## 2024-11-05 NOTE — TELEPHONE ENCOUNTER
Medication sent to preferred pharmacy as requested under patients cardiologist Dr. Montano as DB is no longer practicing in this office.

## 2024-11-05 NOTE — TELEPHONE ENCOUNTER
Upon chart review 90 tab 3 refill sent 4/26/24.    Replied to pt via MyChart regarding findings.

## 2024-11-05 NOTE — TELEPHONE ENCOUNTER
Medication sent to preferred pharmacy as requested under patient's cardiologist Dr. Montano as DB is no longer practicing in this office.

## 2024-11-06 ENCOUNTER — HOSPITAL ENCOUNTER (OUTPATIENT)
Dept: LAB | Facility: MEDICAL CENTER | Age: 74
End: 2024-11-06
Payer: MEDICARE

## 2024-11-06 DIAGNOSIS — I21.4 NSTEMI (NON-ST ELEVATED MYOCARDIAL INFARCTION) (HCC): ICD-10-CM

## 2024-11-06 DIAGNOSIS — I70.0 ARTERIOSCLEROSIS OF AORTA (HCC): ICD-10-CM

## 2024-11-06 DIAGNOSIS — Z00.00 WELLNESS EXAMINATION: ICD-10-CM

## 2024-11-06 DIAGNOSIS — Z12.5 ENCOUNTER FOR SCREENING FOR MALIGNANT NEOPLASM OF PROSTATE: ICD-10-CM

## 2024-11-06 LAB
ALBUMIN SERPL BCP-MCNC: 4.2 G/DL (ref 3.2–4.9)
ALBUMIN/GLOB SERPL: 1.5 G/DL
ALP SERPL-CCNC: 59 U/L (ref 30–99)
ALT SERPL-CCNC: 12 U/L (ref 2–50)
ANION GAP SERPL CALC-SCNC: 8 MMOL/L (ref 7–16)
AST SERPL-CCNC: 20 U/L (ref 12–45)
BILIRUB SERPL-MCNC: 0.7 MG/DL (ref 0.1–1.5)
BUN SERPL-MCNC: 28 MG/DL (ref 8–22)
CALCIUM ALBUM COR SERPL-MCNC: 9.1 MG/DL (ref 8.5–10.5)
CALCIUM SERPL-MCNC: 9.3 MG/DL (ref 8.5–10.5)
CHLORIDE SERPL-SCNC: 106 MMOL/L (ref 96–112)
CHOLEST SERPL-MCNC: 121 MG/DL (ref 100–199)
CO2 SERPL-SCNC: 24 MMOL/L (ref 20–33)
CREAT SERPL-MCNC: 1.18 MG/DL (ref 0.5–1.4)
ERYTHROCYTE [DISTWIDTH] IN BLOOD BY AUTOMATED COUNT: 40.6 FL (ref 35.9–50)
GFR SERPLBLD CREATININE-BSD FMLA CKD-EPI: 65 ML/MIN/1.73 M 2
GLOBULIN SER CALC-MCNC: 2.8 G/DL (ref 1.9–3.5)
GLUCOSE SERPL-MCNC: 93 MG/DL (ref 65–99)
HCT VFR BLD AUTO: 44.2 % (ref 42–52)
HDLC SERPL-MCNC: 49 MG/DL
HGB BLD-MCNC: 14.8 G/DL (ref 14–18)
LDLC SERPL CALC-MCNC: 55 MG/DL
MCH RBC QN AUTO: 30 PG (ref 27–33)
MCHC RBC AUTO-ENTMCNC: 33.5 G/DL (ref 32.3–36.5)
MCV RBC AUTO: 89.7 FL (ref 81.4–97.8)
PLATELET # BLD AUTO: 232 K/UL (ref 164–446)
PMV BLD AUTO: 10.1 FL (ref 9–12.9)
POTASSIUM SERPL-SCNC: 4.3 MMOL/L (ref 3.6–5.5)
PROT SERPL-MCNC: 7 G/DL (ref 6–8.2)
PSA SERPL DL<=0.01 NG/ML-MCNC: 1.66 NG/ML (ref 0–4)
RBC # BLD AUTO: 4.93 M/UL (ref 4.7–6.1)
SODIUM SERPL-SCNC: 138 MMOL/L (ref 135–145)
TRIGL SERPL-MCNC: 83 MG/DL (ref 0–149)
WBC # BLD AUTO: 6.5 K/UL (ref 4.8–10.8)

## 2024-11-06 PROCEDURE — 85027 COMPLETE CBC AUTOMATED: CPT

## 2024-11-06 PROCEDURE — 80053 COMPREHEN METABOLIC PANEL: CPT

## 2024-11-06 PROCEDURE — 80061 LIPID PANEL: CPT

## 2024-11-06 PROCEDURE — 84153 ASSAY OF PSA TOTAL: CPT | Mod: GA

## 2024-11-06 PROCEDURE — 36415 COLL VENOUS BLD VENIPUNCTURE: CPT

## 2024-11-18 NOTE — PROGRESS NOTES
CARDIOLOGY CLINIC NOTE      Date of Visit: 11/19/2024    Primary Care Provider: Jake Haines D.N.P.  Referring Provider: No ref. provider found    Patient Name: Rob Clifton  YOB: 1950  MRN: [id number]     Reason for Visit:   Establish care     Patient Story:   Rob Clifton is a 74 year-old gentleman with a past medical history of coronary artery disease and hyperlipidemia.  Briefly, he presented to the ED on 11/13/2023 with acute onset of chest pain and an elevated troponin.  Coronary angiography demonstrated a subtotal occlusion of the proximal OM1 that was treated with one 2.0 x 30 mm Ogden drug-eluting stent.  He subsequently underwent staged intervention on 12/8/2023 with PCI to the mid left main into the mid LAD with overlapping 3.5 x 26 and 2.5 x 34 mm Mando drug-eluting stents.   He was initially followed by Dr. Melara and presents today to establish care with a long-term provider.     Overall, he reports feeling well and has not had any recurrent severe chest pain.  He does notice that his chest will feel tight for a short period of time when he first begins exercising and then this symptom will resolve.  He has not had any concerning bleeding complications on DAPT.     Medications and Allergies:     Current Outpatient Medications   Medication Sig Dispense Refill    rosuvastatin (CRESTOR) 40 MG tablet Take 1 Tablet by mouth every evening. 90 Tablet 3    clopidogrel (PLAVIX) 75 MG Tab Take 1 Tablet by mouth every day. Replaces prasugrel. 90 Tablet 3    meloxicam (MOBIC) 15 MG tablet Take 1 Tablet by mouth every day. 90 Tablet 2    sildenafil citrate (VIAGRA) 100 MG tablet Take 1 Tablet by mouth 1 time a day as needed for Erectile Dysfunction for up to 100 days. 30 Tablet 3    tamsulosin (FLOMAX) 0.4 MG capsule Take 2 Capsules by mouth every day. 180 Capsule 3    metoprolol SR (TOPROL XL) 25 MG TABLET SR 24 HR Take 1 Tablet by mouth every day. 90 Tablet 3    Doxylamine Succinate,  Sleep, (SLEEP AID PO) Take 1 Dose by mouth at bedtime as needed (SLEEP).      Misc Natural Products (LUTEIN 20 PO) Take 20 mg by mouth every day.       No current facility-administered medications for this visit.     Allergies   Allergen Reactions    Bactrim Ds Unspecified     Patient states jaundice      Medical Decision Making:   # Coronary artery disease (s/p PCI to LM/LAD and OM1): No clear anginal symptoms, but has noticed some very mild chest discomfort that quickly resolves during exercise.  -Surveillance stress test 1-year following left main stenting  -Consider stopping metoprolol pending stress test results and symptoms  -Changed to clopidogrel 75 mg daily indefinitely    # Hyperlipidemia: Borderline optimal control  -Increase rosuvastatin to 40 mg nightly  -Repeat lipids prior to follow-up  -A long-term goal LDL of at least less than 70 and preferably less than 55    Longitudinal Care  Today's visit is associated with medical care services that serve as the continuing focal point for all necessary health care services related to the patient's single, serious condition or multiple chronic complex conditions.  This includes providing services to the patient on an ongoing basis that results in care that is collaborative and personalized to the patient.  The services result in a comprehensive, longitudinal, and continuous relationship with the patient and involve delivery of team-based care that is accessible, coordinated with other practitioners and providers, and integrated with the broader health care landscape.  I have    Follow Up  6 months pending stress test results     Cardiac Studies and Procedures:   Echocardiography  TTE (11/13/2023)  Normal left ventricular size, thickness, and systolic function.  The left ventricular ejection fraction is visually estimated to be 55%.  Normal diastolic function.  The right ventricle is normal in size and systolic function.  No significant valvular abnormalities.  "    Coronary Angiography/Cardiac Catheterization  CAG (11/13/2023)  The left main coronary artery is patent and bifurcates into the left anterior descending and left circumflex coronary arteries.  The left anterior descending coronary artery is a large, transapical vessel with proximal 70% disease, sequential mid 50% stenoses, and distal luminal irregularities.  It supplies a moderate first diagonal branch with proximal 95% disease and a large second diagonal branch with ostial to proximal 70% disease.  The left circumflex coronary artery is a moderate, nondominant vessel with a 99% subtotal occlusion of the proximal first obtuse marginal branch and luminal irregularities in the AV groove vessel, which supplies two small distal obtuse marginal branches.  The right coronary artery is a moderate, dominant vessel that supplies a moderate posterior descending coronary and a large posterolateral branch and has luminal irregularities in the distribution.    Percutaneous Coronary Intervention  PCI (12/08/2023)  Successful percutaneous coronary intervention from the mid left main coronary into the mid left anterior descending coronary with overlapping 3.5 x 26 mm and 2.5 x 34 mm Clifton drug-eluting stents postdilated to 4.5 mm.    PCI (11/13/2023)  Successful complex percutaneous coronary intervention to the subtotally occluded proximal first obtuse marginal branch with one 2.0 x 30 mm Clifton NATY postdilated to 2.7 mm.    Electrophysiology  ECG (12/8/23)  Sinus bradycardia, prolonged MA interval     Vital Signs:   /66 (BP Location: Right arm, Patient Position: Sitting, BP Cuff Size: Adult)   Pulse 62   Resp 16   Ht 1.803 m (5' 11\")   Wt 96.2 kg (212 lb)   SpO2 96%    BP Readings from Last 4 Encounters:   11/19/24 100/66   07/10/24 110/60   04/26/24 119/71   03/12/24 107/69     Wt Readings from Last 4 Encounters:   11/19/24 96.2 kg (212 lb)   07/10/24 95.1 kg (209 lb 9.6 oz)   04/26/24 93 kg (205 lb)   03/12/24 95.7 " "kg (211 lb)     Body mass index is 29.57 kg/m².     Laboratories:   Lipids  Lab Results   Component Value Date/Time    LDL 55 11/06/2024 07:53 AM    LDL 40 11/14/2023 02:22 AM    LDL 60 10/30/2023 07:19 AM    LDL 73 07/20/2023 08:07 AM    LDL 66 11/28/2022 08:42 AM       Lab Results   Component Value Date/Time    HDL 49 11/06/2024 07:53 AM    HDL 42 11/14/2023 02:22 AM    HDL 51 10/30/2023 07:19 AM    HDL 56 07/20/2023 08:07 AM    HDL 57 11/28/2022 08:42 AM       Lab Results   Component Value Date/Time    TRIGLYCERIDE 83 11/06/2024 07:53 AM    TRIGLYCERIDE 102 11/14/2023 02:22 AM    TRIGLYCERIDE 82 10/30/2023 07:19 AM    TRIGLYCERIDE 73 07/20/2023 08:07 AM    TRIGLYCERIDE 138 11/28/2022 08:42 AM       Lab Results   Component Value Date/Time    CHOLSTRLTOT 121 11/06/2024 07:53 AM    CHOLSTRLTOT 102 11/14/2023 02:22 AM    CHOLSTRLTOT 127 10/30/2023 07:19 AM    CHOLSTRLTOT 144 07/20/2023 08:07 AM    CHOLSTRLTOT 151 11/28/2022 08:42 AM       No components found for: \"LPA\"      Chemistries  Lab Results   Component Value Date/Time    CREATININE 1.18 11/06/2024 07:53 AM    CREATININE 1.02 12/04/2023 01:01 PM    CREATININE 1.21 11/14/2023 02:22 AM    CREATININE 1.04 11/13/2023 01:05 AM    CREATININE 1.08 10/30/2023 07:19 AM     Lab Results   Component Value Date/Time    BUN 28 (H) 11/06/2024 07:53 AM    BUN 22 12/04/2023 01:01 PM    BUN 19 11/14/2023 02:22 AM    BUN 24 (H) 11/13/2023 01:05 AM    BUN 21 10/30/2023 07:19 AM     Lab Results   Component Value Date/Time    POTASSIUM 4.3 11/06/2024 07:53 AM    POTASSIUM 4.2 12/04/2023 01:01 PM    POTASSIUM 4.5 11/14/2023 02:22 AM     Lab Results   Component Value Date/Time    SODIUM 138 11/06/2024 07:53 AM    SODIUM 138 12/04/2023 01:01 PM    SODIUM 139 11/14/2023 02:22 AM     Lab Results   Component Value Date/Time    GLUCOSE 93 11/06/2024 07:53 AM    GLUCOSE 98 12/04/2023 01:01 PM    GLUCOSE 89 11/14/2023 02:22 AM     Lab Results   Component Value Date/Time    ASTSGOT 20 " "11/06/2024 07:53 AM    ASTSGOT 14 12/04/2023 01:01 PM    ASTSGOT 16 11/13/2023 01:05 AM     Lab Results   Component Value Date/Time    ALTSGPT 12 11/06/2024 07:53 AM    ALTSGPT 11 12/04/2023 01:01 PM    ALTSGPT 10 11/13/2023 01:05 AM     Lab Results   Component Value Date/Time    ALKPHOSPHAT 59 11/06/2024 07:53 AM    ALKPHOSPHAT 59 12/04/2023 01:01 PM    ALKPHOSPHAT 60 11/13/2023 01:05 AM     Lab Results   Component Value Date/Time    HBA1C 5.6 11/13/2023 01:05 AM     No results found for: \"TSH\"  No results found for: \"NTPROBNP\"  Lab Results   Component Value Date/Time    TROPONINT 157 (H) 11/14/2023 09:04 AM    TROPONINT 180 (H) 11/14/2023 02:22 AM    TROPONINT 192 (H) 11/13/2023 09:07 PM     Blood Counts  Lab Results   Component Value Date/Time    HEMOGLOBIN 14.8 11/06/2024 07:53 AM    HEMOGLOBIN 14.3 12/04/2023 01:01 PM    HEMOGLOBIN 13.6 (L) 11/14/2023 02:22 AM     Lab Results   Component Value Date/Time    PLATELETCT 232 11/06/2024 07:53 AM    PLATELETCT 208 12/04/2023 01:01 PM    PLATELETCT 212 11/14/2023 02:22 AM     Lab Results   Component Value Date/Time    WBC 6.5 11/06/2024 07:53 AM    WBC 7.6 12/04/2023 01:01 PM    WBC 7.4 11/14/2023 02:22 AM      Physical Examination:   General: Well-appearing, no acute distress  Eyes: Extraocular movements intact, anicteric  Neck: Full range of motion, no gross jugular venous distension  Pulmonary: Normal respiratory effort, no distress  Cardiovascular: Regular rate, regular rhythm  Gastrointestinal: Nondistended  Extremities: Moves all extremities normally, no gross lower extremity edema  Neurological: Alert and oriented, normal speech  Psychiatric: Normal affect, normal judgment     Past History:   Past Medical History  The patient's past medical history was reviewed.  See HPI and self-reported patient medical history form for pertinent medical history to consultation.    Past Social History  The patient's social history was reviewed.  See HPI self-reported patient " medical history form for pertinent social history to consultation.    Past Family History  The patient's family history was reviewed.  See HPI self-reported patient medical history form for pertinent family history to consultation.    Review of Systems  A pertinent cardiac review of systems was performed and was otherwise unremarkable except as per HPI and self-reported patient medical history form.        Mehran Hernandez MD, [facility]  Interventional Cardiology  [facility], [address]  [address] [place] [id number]  Phone: [phone]  Fax: [phone]

## 2024-11-19 ENCOUNTER — OFFICE VISIT (OUTPATIENT)
Dept: CARDIOLOGY | Facility: MEDICAL CENTER | Age: 74
End: 2024-11-19
Attending: INTERNAL MEDICINE
Payer: MEDICARE

## 2024-11-19 VITALS
DIASTOLIC BLOOD PRESSURE: 66 MMHG | RESPIRATION RATE: 16 BRPM | HEART RATE: 62 BPM | WEIGHT: 212 LBS | BODY MASS INDEX: 29.68 KG/M2 | OXYGEN SATURATION: 96 % | HEIGHT: 71 IN | SYSTOLIC BLOOD PRESSURE: 100 MMHG

## 2024-11-19 DIAGNOSIS — I21.4 NSTEMI (NON-ST ELEVATED MYOCARDIAL INFARCTION) (HCC): ICD-10-CM

## 2024-11-19 DIAGNOSIS — Z95.5 S/P DRUG ELUTING CORONARY STENT PLACEMENT: ICD-10-CM

## 2024-11-19 DIAGNOSIS — I25.2 HISTORY OF MYOCARDIAL INFARCTION: ICD-10-CM

## 2024-11-19 DIAGNOSIS — I70.0 AORTIC ATHEROSCLEROSIS (HCC): ICD-10-CM

## 2024-11-19 DIAGNOSIS — E78.00 PURE HYPERCHOLESTEROLEMIA: ICD-10-CM

## 2024-11-19 PROCEDURE — 3074F SYST BP LT 130 MM HG: CPT | Performed by: INTERNAL MEDICINE

## 2024-11-19 PROCEDURE — G2211 COMPLEX E/M VISIT ADD ON: HCPCS | Performed by: INTERNAL MEDICINE

## 2024-11-19 PROCEDURE — 99214 OFFICE O/P EST MOD 30 MIN: CPT | Performed by: INTERNAL MEDICINE

## 2024-11-19 PROCEDURE — 3078F DIAST BP <80 MM HG: CPT | Performed by: INTERNAL MEDICINE

## 2024-11-19 PROCEDURE — 99212 OFFICE O/P EST SF 10 MIN: CPT | Performed by: INTERNAL MEDICINE

## 2024-11-19 RX ORDER — CLOPIDOGREL BISULFATE 75 MG/1
75 TABLET ORAL DAILY
Qty: 90 TABLET | Refills: 3 | Status: SHIPPED | OUTPATIENT
Start: 2024-11-19

## 2024-11-19 RX ORDER — ROSUVASTATIN CALCIUM 40 MG/1
40 TABLET, COATED ORAL EVERY EVENING
Qty: 90 TABLET | Refills: 3 | Status: SHIPPED | OUTPATIENT
Start: 2024-11-19

## 2024-11-19 ASSESSMENT — FIBROSIS 4 INDEX: FIB4 SCORE: 1.79

## 2024-11-19 NOTE — PATIENT INSTRUCTIONS
Increase rosuvastatin dose to 40 mg nightly  Stop taking aspirin  When you run out of prasugrel, start taking clopidogrel 75 mg daily  Schedule stress test  Follow up in 6 months, have fasting labs done 1 week prior to appointment

## 2024-11-26 DIAGNOSIS — N40.1 BENIGN PROSTATIC HYPERPLASIA WITH NOCTURIA: ICD-10-CM

## 2024-11-26 DIAGNOSIS — R35.1 BENIGN PROSTATIC HYPERPLASIA WITH NOCTURIA: ICD-10-CM

## 2024-11-27 RX ORDER — TAMSULOSIN HYDROCHLORIDE 0.4 MG/1
0.8 CAPSULE ORAL DAILY
Qty: 180 CAPSULE | Refills: 2 | Status: SHIPPED | OUTPATIENT
Start: 2024-11-27

## 2024-11-27 NOTE — TELEPHONE ENCOUNTER
Received request via: Patient    Was the patient seen in the last year in this department? Yes    Does the patient have an active prescription (recently filled or refills available) for medication(s) requested? No    [facility]     Does the patient have custodial Plus and need 100-day supply? (This applies to ALL medications) Patient does not have SCP

## 2024-12-06 ENCOUNTER — HOSPITAL ENCOUNTER (OUTPATIENT)
Dept: RADIOLOGY | Facility: MEDICAL CENTER | Age: 74
End: 2024-12-06
Attending: INTERNAL MEDICINE
Payer: MEDICARE

## 2024-12-06 DIAGNOSIS — I21.4 NSTEMI (NON-ST ELEVATED MYOCARDIAL INFARCTION) (HCC): ICD-10-CM

## 2024-12-06 PROCEDURE — 78431 MYOCRD IMG PET RST&STRS CT: CPT

## 2024-12-07 PROCEDURE — 78431 MYOCRD IMG PET RST&STRS CT: CPT | Mod: 26 | Performed by: INTERNAL MEDICINE

## 2024-12-07 PROCEDURE — 93018 CV STRESS TEST I&R ONLY: CPT | Performed by: INTERNAL MEDICINE

## 2025-01-02 DIAGNOSIS — N52.8 OTHER MALE ERECTILE DYSFUNCTION: ICD-10-CM

## 2025-01-03 RX ORDER — SILDENAFIL 100 MG/1
100 TABLET, FILM COATED ORAL
Qty: 30 TABLET | Refills: 3 | Status: SHIPPED | OUTPATIENT
Start: 2025-01-03 | End: 2025-04-13

## 2025-01-03 NOTE — TELEPHONE ENCOUNTER
Received request via: Patient    Was the patient seen in the last year in this department? Yes    Does the patient have an active prescription (recently filled or refills available) for medication(s) requested? No    Pharmacy Name:     Does the patient have [facility] Plus and need 100-day supply? (This applies to ALL medications) Patient does not have SCP

## 2025-01-09 DIAGNOSIS — I25.10 CORONARY ARTERY DISEASE INVOLVING NATIVE HEART WITHOUT ANGINA PECTORIS, UNSPECIFIED VESSEL OR LESION TYPE: ICD-10-CM

## 2025-01-09 NOTE — TELEPHONE ENCOUNTER
"To BN, please advise on metoprolol refill, thank you!    ======================    Upon chart review, per BN last OV note, \"Consider stopping metoprolol pending stress test results and symptoms \"        "

## 2025-01-10 RX ORDER — METOPROLOL SUCCINATE 25 MG/1
25 TABLET, EXTENDED RELEASE ORAL DAILY
Qty: 90 TABLET | Refills: 3 | Status: SHIPPED | OUTPATIENT
Start: 2025-01-10

## 2025-01-18 DIAGNOSIS — M79.641 PAIN IN BOTH HANDS: ICD-10-CM

## 2025-01-18 DIAGNOSIS — M79.642 PAIN IN BOTH HANDS: ICD-10-CM

## 2025-01-20 RX ORDER — MELOXICAM 15 MG/1
15 TABLET ORAL DAILY
Qty: 90 TABLET | Refills: 2 | Status: SHIPPED | OUTPATIENT
Start: 2025-01-20

## 2025-01-20 NOTE — TELEPHONE ENCOUNTER
Received request via: Patient    Was the patient seen in the last year in this department? Yes    Does the patient have an active prescription (recently filled or refills available) for medication(s) requested? No    Pharmacy Name: [id number] - [place], [place] - [address]     Does the patient have long-term Plus and need 100-day supply? (This applies to ALL medications) Patient does not have SCP

## 2025-01-22 ENCOUNTER — PATIENT MESSAGE (OUTPATIENT)
Dept: CARDIOLOGY | Facility: MEDICAL CENTER | Age: 75
End: 2025-01-22
Payer: MEDICARE

## 2025-01-22 DIAGNOSIS — E78.00 PURE HYPERCHOLESTEROLEMIA: ICD-10-CM

## 2025-01-22 DIAGNOSIS — M25.551 RIGHT HIP PAIN: ICD-10-CM

## 2025-02-16 DIAGNOSIS — I21.4 NSTEMI (NON-ST ELEVATED MYOCARDIAL INFARCTION) (HCC): ICD-10-CM

## 2025-02-18 RX ORDER — CLOPIDOGREL BISULFATE 75 MG/1
75 TABLET ORAL DAILY
Qty: 90 TABLET | Refills: 3 | OUTPATIENT
Start: 2025-02-18

## 2025-02-21 DIAGNOSIS — R35.1 BENIGN PROSTATIC HYPERPLASIA WITH NOCTURIA: ICD-10-CM

## 2025-02-21 DIAGNOSIS — N40.1 BENIGN PROSTATIC HYPERPLASIA WITH NOCTURIA: ICD-10-CM

## 2025-02-24 RX ORDER — TAMSULOSIN HYDROCHLORIDE 0.4 MG/1
0.8 CAPSULE ORAL DAILY
Qty: 180 CAPSULE | Refills: 1 | Status: SHIPPED | OUTPATIENT
Start: 2025-02-24

## 2025-02-24 NOTE — TELEPHONE ENCOUNTER
Received request via: Pharmacy    Was the patient seen in the last year in this department? Yes    Does the patient have an active prescription (recently filled or refills available) for medication(s) requested? No    [facility]    Does the patient have half-way Plus and need 100-day supply? (This applies to ALL medications) Patient does not have SCP

## 2025-04-09 ENCOUNTER — APPOINTMENT (OUTPATIENT)
Dept: URBAN - METROPOLITAN AREA CLINIC 4 | Facility: CLINIC | Age: 75
Setting detail: DERMATOLOGY
End: 2025-04-09

## 2025-04-09 DIAGNOSIS — D18.0 HEMANGIOMA: ICD-10-CM

## 2025-04-09 DIAGNOSIS — L82.1 OTHER SEBORRHEIC KERATOSIS: ICD-10-CM

## 2025-04-09 DIAGNOSIS — L57.0 ACTINIC KERATOSIS: ICD-10-CM

## 2025-04-09 DIAGNOSIS — Z71.89 OTHER SPECIFIED COUNSELING: ICD-10-CM

## 2025-04-09 DIAGNOSIS — B07.8 OTHER VIRAL WARTS: ICD-10-CM

## 2025-04-09 DIAGNOSIS — L81.4 OTHER MELANIN HYPERPIGMENTATION: ICD-10-CM

## 2025-04-09 PROBLEM — D23.22 OTHER BENIGN NEOPLASM OF SKIN OF LEFT EAR AND EXTERNAL AURICULAR CANAL: Status: ACTIVE | Noted: 2025-04-09

## 2025-04-09 PROBLEM — D23.21 OTHER BENIGN NEOPLASM OF SKIN OF RIGHT EAR AND EXTERNAL AURICULAR CANAL: Status: ACTIVE | Noted: 2025-04-09

## 2025-04-09 PROBLEM — D18.01 HEMANGIOMA OF SKIN AND SUBCUTANEOUS TISSUE: Status: ACTIVE | Noted: 2025-04-09

## 2025-04-09 PROBLEM — D48.5 NEOPLASM OF UNCERTAIN BEHAVIOR OF SKIN: Status: ACTIVE | Noted: 2025-04-09

## 2025-04-09 PROCEDURE — ? LIQUID NITROGEN

## 2025-04-09 PROCEDURE — ? SUNSCREEN RECOMMENDATIONS

## 2025-04-09 PROCEDURE — 17110 DESTRUCTION B9 LES UP TO 14: CPT

## 2025-04-09 PROCEDURE — 17000 DESTRUCT PREMALG LESION: CPT | Mod: 59

## 2025-04-09 PROCEDURE — ? COUNSELING

## 2025-04-09 PROCEDURE — 99213 OFFICE O/P EST LOW 20 MIN: CPT | Mod: 25

## 2025-04-09 ASSESSMENT — LOCATION DETAILED DESCRIPTION DERM
LOCATION DETAILED: LEFT LATERAL MALAR CHEEK
LOCATION DETAILED: STERNUM
LOCATION DETAILED: INFERIOR THORACIC SPINE
LOCATION DETAILED: PERIUMBILICAL SKIN
LOCATION DETAILED: RIGHT CENTRAL FRONTAL SCALP
LOCATION DETAILED: LEFT CENTRAL MALAR CHEEK
LOCATION DETAILED: RIGHT INFERIOR MEDIAL UPPER BACK
LOCATION DETAILED: LEFT DISTAL POSTERIOR UPPER ARM
LOCATION DETAILED: RIGHT DISTAL POSTERIOR UPPER ARM
LOCATION DETAILED: LEFT PROXIMAL POSTERIOR UPPER ARM
LOCATION DETAILED: RIGHT MEDIAL MALAR CHEEK
LOCATION DETAILED: RIGHT CENTRAL MALAR CHEEK
LOCATION DETAILED: RIGHT PROXIMAL POSTERIOR UPPER ARM

## 2025-04-09 ASSESSMENT — LOCATION ZONE DERM
LOCATION ZONE: SCALP
LOCATION ZONE: FACE
LOCATION ZONE: ARM
LOCATION ZONE: TRUNK

## 2025-04-09 ASSESSMENT — LOCATION SIMPLE DESCRIPTION DERM
LOCATION SIMPLE: ABDOMEN
LOCATION SIMPLE: LEFT CHEEK
LOCATION SIMPLE: RIGHT UPPER ARM
LOCATION SIMPLE: RIGHT CHEEK
LOCATION SIMPLE: CHEST
LOCATION SIMPLE: UPPER BACK
LOCATION SIMPLE: RIGHT UPPER BACK
LOCATION SIMPLE: LEFT UPPER ARM
LOCATION SIMPLE: RIGHT SCALP

## 2025-04-09 NOTE — PROCEDURE: LIQUID NITROGEN
Post-Care Instructions: I reviewed with the patient in detail post-care instructions. Patient is to wear sunprotection, and avoid picking at any of the treated lesions. Pt may apply Vaseline to crusted or scabbing areas.
Duration Of Freeze Thaw-Cycle (Seconds): 3
Number Of Freeze-Thaw Cycles: 1 freeze-thaw cycle
Render Post-Care Instructions In Note?: no
Detail Level: Detailed
Consent: The patient's consent was obtained including but not limited to risks of crusting, scabbing, blistering, scarring, darker or lighter pigmentary change, recurrence, incomplete removal and infection.
Show Aperture Variable?: Yes
Aperture Size (Optional): C
Application Tool (Optional): Cry-AC
Medical Necessity Clause: This procedure was medically necessary because the lesions that were treated were:
Spray Paint Text: The liquid nitrogen was applied to the skin utilizing a spray paint frosting technique.
Medical Necessity Information: It is in your best interest to select a reason for this procedure from the list below. All of these items fulfill various CMS LCD requirements except the new and changing color options.
Car

## 2025-04-10 DIAGNOSIS — M79.641 PAIN IN BOTH HANDS: ICD-10-CM

## 2025-04-10 DIAGNOSIS — I25.10 CORONARY ARTERY DISEASE INVOLVING NATIVE HEART WITHOUT ANGINA PECTORIS, UNSPECIFIED VESSEL OR LESION TYPE: ICD-10-CM

## 2025-04-10 DIAGNOSIS — N52.8 OTHER MALE ERECTILE DYSFUNCTION: ICD-10-CM

## 2025-04-10 DIAGNOSIS — M79.642 PAIN IN BOTH HANDS: ICD-10-CM

## 2025-04-11 NOTE — TELEPHONE ENCOUNTER
Received request via: Pharmacy    Was the patient seen in the last year in this department? Yes    Does the patient have an active prescription (recently filled or refills available) for medication(s) requested? No    [facility]    Does the patient have [facility] Plus and need 100-day supply? (This applies to ALL medications) Patient does not have SCP

## 2025-04-14 RX ORDER — MELOXICAM 15 MG/1
15 TABLET ORAL DAILY
Qty: 90 TABLET | Refills: 0 | Status: SHIPPED | OUTPATIENT
Start: 2025-04-14

## 2025-04-14 RX ORDER — SILDENAFIL 100 MG/1
100 TABLET, FILM COATED ORAL
Qty: 30 TABLET | Refills: 0 | Status: SHIPPED | OUTPATIENT
Start: 2025-04-14 | End: 2025-07-23

## 2025-04-15 RX ORDER — METOPROLOL SUCCINATE 25 MG/1
25 TABLET, EXTENDED RELEASE ORAL DAILY
Qty: 90 TABLET | Refills: 3 | Status: SHIPPED | OUTPATIENT
Start: 2025-04-15

## 2025-05-19 DIAGNOSIS — R35.1 BENIGN PROSTATIC HYPERPLASIA WITH NOCTURIA: ICD-10-CM

## 2025-05-19 DIAGNOSIS — N40.1 BENIGN PROSTATIC HYPERPLASIA WITH NOCTURIA: ICD-10-CM

## 2025-05-19 RX ORDER — TAMSULOSIN HYDROCHLORIDE 0.4 MG/1
0.8 CAPSULE ORAL DAILY
Qty: 180 CAPSULE | Refills: 0 | Status: SHIPPED | OUTPATIENT
Start: 2025-05-19

## 2025-05-19 NOTE — TELEPHONE ENCOUNTER
Received request via: Patient    Was the patient seen in the last year in this department? Yes    Does the patient have an active prescription (recently filled or refills available) for medication(s) requested? No    [facility]    Does the patient have [facility] Plus and need 100-day supply? (This applies to ALL medications) Patient does not have SCP    Last office visit 07/10/2024  Last labs 11/06/2024

## 2025-05-21 ENCOUNTER — TELEPHONE (OUTPATIENT)
Dept: CARDIOLOGY | Facility: MEDICAL CENTER | Age: 75
End: 2025-05-21
Payer: MEDICARE

## 2025-05-21 NOTE — TELEPHONE ENCOUNTER
Spoke with pt regarding his outstanding labs. Pt stated that he is planning on getting his labs completed at [facility] closer to his appt with RAMONA. Pt is aware of his appt date, time and location.

## 2025-05-27 ENCOUNTER — HOSPITAL ENCOUNTER (OUTPATIENT)
Dept: LAB | Facility: MEDICAL CENTER | Age: 75
End: 2025-05-27
Attending: INTERNAL MEDICINE
Payer: MEDICARE

## 2025-05-27 DIAGNOSIS — E78.00 PURE HYPERCHOLESTEROLEMIA: ICD-10-CM

## 2025-05-27 DIAGNOSIS — I21.4 NSTEMI (NON-ST ELEVATED MYOCARDIAL INFARCTION) (HCC): ICD-10-CM

## 2025-05-27 LAB
ANION GAP SERPL CALC-SCNC: 11 MMOL/L (ref 7–16)
BUN SERPL-MCNC: 24 MG/DL (ref 8–22)
CALCIUM SERPL-MCNC: 8.9 MG/DL (ref 8.5–10.5)
CHLORIDE SERPL-SCNC: 110 MMOL/L (ref 96–112)
CHOLEST SERPL-MCNC: 116 MG/DL (ref 100–199)
CO2 SERPL-SCNC: 22 MMOL/L (ref 20–33)
CREAT SERPL-MCNC: 1.28 MG/DL (ref 0.5–1.4)
FASTING STATUS PATIENT QL REPORTED: NORMAL
GFR SERPLBLD CREATININE-BSD FMLA CKD-EPI: 59 ML/MIN/1.73 M 2
GLUCOSE SERPL-MCNC: 97 MG/DL (ref 65–99)
HDLC SERPL-MCNC: 52 MG/DL
LDLC SERPL CALC-MCNC: 48 MG/DL
POTASSIUM SERPL-SCNC: 4.4 MMOL/L (ref 3.6–5.5)
SODIUM SERPL-SCNC: 143 MMOL/L (ref 135–145)
TRIGL SERPL-MCNC: 82 MG/DL (ref 0–149)

## 2025-05-27 PROCEDURE — 80048 BASIC METABOLIC PNL TOTAL CA: CPT

## 2025-05-27 PROCEDURE — 80061 LIPID PANEL: CPT

## 2025-05-27 PROCEDURE — 36415 COLL VENOUS BLD VENIPUNCTURE: CPT

## 2025-05-28 ENCOUNTER — RESULTS FOLLOW-UP (OUTPATIENT)
Dept: CARDIOLOGY | Facility: MEDICAL CENTER | Age: 75
End: 2025-05-28
Payer: MEDICARE

## 2025-05-30 NOTE — PROGRESS NOTES
CARDIOLOGY CLINIC NOTE      Date of Visit: 6/2/2025    Primary Care Provider: Jake Haines D.N.P.    Patient Name: Rob Clifton  YOB: 1950  MRN: [id number]     Reason for Visit:   6-month follow-up     Patient Story:   Rob Clifton is a 74 year-old gentleman with a past medical history of coronary artery disease and hyperlipidemia.  Briefly, he presented to the ED on 11/13/2023 with acute onset of chest pain and an elevated troponin.  Coronary angiography demonstrated a subtotal occlusion of the proximal OM1 that was treated with one 2.0 x 30 mm [place] drug-eluting stent.  He subsequently underwent staged intervention on 12/8/2023 with PCI to the mid left main into the mid LAD with overlapping 3.5 x 26 and 2.5 x 34 mm [place] drug-eluting stents.   He was initially followed by Dr. Melara and then established care with Dr. Hernandez for long-term provider.    At follow up he was doing well, he chest tightness when he first begins exercising and then this symptom will resolve.  He has not had any concerning bleeding complications on DAPT. He completed a cardiac PET scan which showed no significant ischemia, SDS of 2 with inferior ischemia vs attentuation defect. His was changed to SAPT.     Burton returns for 6 month follow up. He is exercising regularly both aerobic and weights.  He is not experiencing any of the chest tightness.  He feels quite well when he is exercising.  He is happy with his medications.  There are times when he gets cramping at night in his feet although this occurs fairly infrequently at this point.     Medications and Allergies:     Current Outpatient Medications   Medication Sig Dispense Refill    clopidogrel (PLAVIX) 75 MG Tab Take 1 Tablet by mouth every day. 90 Tablet 3    rosuvastatin (CRESTOR) 40 MG tablet Take 1 Tablet by mouth every evening. 90 Tablet 3    tamsulosin (FLOMAX) 0.4 MG capsule Take 2 Capsules by mouth every day. 180 Capsule 0    metoprolol SR (TOPROL  XL) 25 MG TABLET SR 24 HR Take 1 Tablet by mouth every day. 90 Tablet 3    sildenafil citrate (VIAGRA) 100 MG tablet Take 1 Tablet by mouth 1 time a day as needed for Erectile Dysfunction for up to 100 days. 30 Tablet 0    meloxicam (MOBIC) 15 MG tablet Take 1 Tablet by mouth every day. 90 Tablet 0    Doxylamine Succinate, Sleep, (SLEEP AID PO) Take 1 Dose by mouth at bedtime as needed (SLEEP).      Misc Natural Products (LUTEIN 20 PO) Take 20 mg by mouth every day.       No current facility-administered medications for this visit.     Allergies   Allergen Reactions    Bactrim Ds Unspecified     Patient states jaundice      Medical Decision Making:   # Coronary artery disease (s/p PCI to LM/LAD and OM1): Burton is doing quite well with no anginal symptoms on a regular exercise program.  His surveillance stress test performed December 2024 shows no significant ischemia.  He is doing well on his medications and declines any further changes at this point although we did discuss stopping metoprolol if he wishes.  - Continue metoprolol XL 25 mg daily  - Continue clopidogrel 75 mg daily indefinitely    # Hyperlipidemia: Improved control  - Continue rosuvastatin to 40 mg nightly  -Repeat lipids due in 2026  -A long-term goal LDL of at least less than 70 and preferably less than 55      Follow Up  1 year with MD.  Sooner with me if necessary     Cardiac Studies and Procedures:   Echocardiography  TTE (11/13/2023)  Normal left ventricular size, thickness, and systolic function.  The left ventricular ejection fraction is visually estimated to be 55%.  Normal diastolic function.  The right ventricle is normal in size and systolic function.  No significant valvular abnormalities.     Coronary Angiography/Cardiac Catheterization  CAG (11/13/2023)  The left main coronary artery is patent and bifurcates into the left anterior descending and left circumflex coronary arteries.  The left anterior descending coronary artery is a large,  "transapical vessel with proximal 70% disease, sequential mid 50% stenoses, and distal luminal irregularities.  It supplies a moderate first diagonal branch with proximal 95% disease and a large second diagonal branch with ostial to proximal 70% disease.  The left circumflex coronary artery is a moderate, nondominant vessel with a 99% subtotal occlusion of the proximal first obtuse marginal branch and luminal irregularities in the AV groove vessel, which supplies two small distal obtuse marginal branches.  The right coronary artery is a moderate, dominant vessel that supplies a moderate posterior descending coronary and a large posterolateral branch and has luminal irregularities in the distribution.    Percutaneous Coronary Intervention  PCI (12/08/2023)  Successful percutaneous coronary intervention from the mid left main coronary into the mid left anterior descending coronary with overlapping 3.5 x 26 mm and 2.5 x 34 mm [place] drug-eluting stents postdilated to 4.5 mm.    PCI (11/13/2023)  Successful complex percutaneous coronary intervention to the subtotally occluded proximal first obtuse marginal branch with one 2.0 x 30 mm [place] NATY postdilated to 2.7 mm.    Electrophysiology  ECG (12/8/23)  Sinus bradycardia, prolonged MD interval     Vital Signs:   /66 (BP Location: Left arm, Patient Position: Sitting)   Pulse 62   Resp 16   Ht 1.803 m (5' 11\")   Wt 96.2 kg (212 lb)   SpO2 95%    BP Readings from Last 4 Encounters:   06/02/25 114/66   11/19/24 100/66   07/10/24 110/60   04/26/24 119/71     Wt Readings from Last 4 Encounters:   06/02/25 96.2 kg (212 lb)   11/19/24 96.2 kg (212 lb)   07/10/24 95.1 kg (209 lb 9.6 oz)   04/26/24 93 kg (205 lb)     Body mass index is 29.57 kg/m².     Laboratories:   Lipids  Lab Results   Component Value Date/Time    LDL 48 05/27/2025 07:50 AM    LDL 55 11/06/2024 07:53 AM    LDL 40 11/14/2023 02:22 AM    LDL 60 10/30/2023 07:19 AM    LDL 73 07/20/2023 08:07 AM       Lab " "Results   Component Value Date/Time    HDL 52 05/27/2025 07:50 AM    HDL 49 11/06/2024 07:53 AM    HDL 42 11/14/2023 02:22 AM    HDL 51 10/30/2023 07:19 AM    HDL 56 07/20/2023 08:07 AM       Lab Results   Component Value Date/Time    TRIGLYCERIDE 82 05/27/2025 07:50 AM    TRIGLYCERIDE 83 11/06/2024 07:53 AM    TRIGLYCERIDE 102 11/14/2023 02:22 AM    TRIGLYCERIDE 82 10/30/2023 07:19 AM    TRIGLYCERIDE 73 07/20/2023 08:07 AM       Lab Results   Component Value Date/Time    CHOLSTRLTOT 116 05/27/2025 07:50 AM    CHOLSTRLTOT 121 11/06/2024 07:53 AM    CHOLSTRLTOT 102 11/14/2023 02:22 AM    CHOLSTRLTOT 127 10/30/2023 07:19 AM    CHOLSTRLTOT 144 07/20/2023 08:07 AM       No components found for: \"LPA\"      Chemistries  Lab Results   Component Value Date/Time    CREATININE 1.28 05/27/2025 07:50 AM    CREATININE 1.18 11/06/2024 07:53 AM    CREATININE 1.02 12/04/2023 01:01 PM    CREATININE 1.21 11/14/2023 02:22 AM    CREATININE 1.04 11/13/2023 01:05 AM     Lab Results   Component Value Date/Time    BUN 24 (H) 05/27/2025 07:50 AM    BUN 28 (H) 11/06/2024 07:53 AM    BUN 22 12/04/2023 01:01 PM    BUN 19 11/14/2023 02:22 AM    BUN 24 (H) 11/13/2023 01:05 AM     Lab Results   Component Value Date/Time    POTASSIUM 4.4 05/27/2025 07:50 AM    POTASSIUM 4.3 11/06/2024 07:53 AM    POTASSIUM 4.2 12/04/2023 01:01 PM     Lab Results   Component Value Date/Time    SODIUM 143 05/27/2025 07:50 AM    SODIUM 138 11/06/2024 07:53 AM    SODIUM 138 12/04/2023 01:01 PM     Lab Results   Component Value Date/Time    GLUCOSE 97 05/27/2025 07:50 AM    GLUCOSE 93 11/06/2024 07:53 AM    GLUCOSE 98 12/04/2023 01:01 PM     Lab Results   Component Value Date/Time    ASTSGOT 20 11/06/2024 07:53 AM    ASTSGOT 14 12/04/2023 01:01 PM    ASTSGOT 16 11/13/2023 01:05 AM     Lab Results   Component Value Date/Time    ALTSGPT 12 11/06/2024 07:53 AM    ALTSGPT 11 12/04/2023 01:01 PM    ALTSGPT 10 11/13/2023 01:05 AM     Lab Results   Component Value Date/Time " "   ALKPHOSPHAT 59 11/06/2024 07:53 AM    ALKPHOSPHAT 59 12/04/2023 01:01 PM    ALKPHOSPHAT 60 11/13/2023 01:05 AM     Lab Results   Component Value Date/Time    HBA1C 5.6 11/13/2023 01:05 AM     No results found for: \"TSH\"  No results found for: \"NTPROBNP\"  Lab Results   Component Value Date/Time    TROPONINT 157 (H) 11/14/2023 09:04 AM    TROPONINT 180 (H) 11/14/2023 02:22 AM    TROPONINT 192 (H) 11/13/2023 09:07 PM     Blood Counts  Lab Results   Component Value Date/Time    HEMOGLOBIN 14.8 11/06/2024 07:53 AM    HEMOGLOBIN 14.3 12/04/2023 01:01 PM    HEMOGLOBIN 13.6 (L) 11/14/2023 02:22 AM     Lab Results   Component Value Date/Time    PLATELETCT 232 11/06/2024 07:53 AM    PLATELETCT 208 12/04/2023 01:01 PM    PLATELETCT 212 11/14/2023 02:22 AM     Lab Results   Component Value Date/Time    WBC 6.5 11/06/2024 07:53 AM    WBC 7.6 12/04/2023 01:01 PM    WBC 7.4 11/14/2023 02:22 AM      Physical Examination:   General: Well-appearing, no acute distress  Eyes: Extraocular movements intact, anicteric  Neck: Full range of motion, no gross jugular venous distension  Pulmonary: Normal respiratory effort, no distress  Cardiovascular: Regular rate, regular rhythm  Gastrointestinal: Nondistended  Extremities: Moves all extremities normally, no gross lower extremity edema  Neurological: Alert and oriented, normal speech  Psychiatric: Normal affect, normal judgment     Past History:   Past Medical History  The patient's past medical history was reviewed.  See HPI and self-reported patient medical history form for pertinent medical history to consultation.    Past Social History  The patient's social history was reviewed.  See [facility] self-reported patient medical history form for pertinent social history to consultation.    Past Family History  The patient's family history was reviewed.  See [facility] self-reported patient medical history form for pertinent family history to consultation.    Review of Systems  A pertinent cardiac " review of systems was performed and was otherwise unremarkable except as per HPI and self-reported patient medical history form.

## 2025-06-02 ENCOUNTER — OFFICE VISIT (OUTPATIENT)
Dept: CARDIOLOGY | Facility: MEDICAL CENTER | Age: 75
End: 2025-06-02
Attending: PHYSICIAN ASSISTANT
Payer: MEDICARE

## 2025-06-02 VITALS
WEIGHT: 212 LBS | RESPIRATION RATE: 16 BRPM | BODY MASS INDEX: 29.68 KG/M2 | DIASTOLIC BLOOD PRESSURE: 66 MMHG | HEART RATE: 62 BPM | OXYGEN SATURATION: 95 % | HEIGHT: 71 IN | SYSTOLIC BLOOD PRESSURE: 114 MMHG

## 2025-06-02 DIAGNOSIS — I24.0 ISCHEMIC HEART DISEASE DUE TO CORONARY ARTERY OBSTRUCTION (HCC): ICD-10-CM

## 2025-06-02 DIAGNOSIS — I70.0 AORTIC ATHEROSCLEROSIS (HCC): ICD-10-CM

## 2025-06-02 DIAGNOSIS — Z95.5 S/P DRUG ELUTING CORONARY STENT PLACEMENT: ICD-10-CM

## 2025-06-02 DIAGNOSIS — E78.00 PURE HYPERCHOLESTEROLEMIA: Primary | ICD-10-CM

## 2025-06-02 DIAGNOSIS — I25.9 ISCHEMIC HEART DISEASE DUE TO CORONARY ARTERY OBSTRUCTION (HCC): ICD-10-CM

## 2025-06-02 DIAGNOSIS — I21.4 NSTEMI (NON-ST ELEVATED MYOCARDIAL INFARCTION) (HCC): ICD-10-CM

## 2025-06-02 PROCEDURE — 99213 OFFICE O/P EST LOW 20 MIN: CPT | Performed by: PHYSICIAN ASSISTANT

## 2025-06-02 PROCEDURE — 99214 OFFICE O/P EST MOD 30 MIN: CPT | Performed by: PHYSICIAN ASSISTANT

## 2025-06-02 PROCEDURE — 3074F SYST BP LT 130 MM HG: CPT | Performed by: PHYSICIAN ASSISTANT

## 2025-06-02 PROCEDURE — 3078F DIAST BP <80 MM HG: CPT | Performed by: PHYSICIAN ASSISTANT

## 2025-06-02 RX ORDER — ROSUVASTATIN CALCIUM 40 MG/1
40 TABLET, COATED ORAL EVERY EVENING
Qty: 90 TABLET | Refills: 3 | Status: SHIPPED | OUTPATIENT
Start: 2025-06-02

## 2025-06-02 RX ORDER — CLOPIDOGREL BISULFATE 75 MG/1
75 TABLET ORAL DAILY
Qty: 90 TABLET | Refills: 3 | Status: SHIPPED | OUTPATIENT
Start: 2025-06-02

## 2025-06-02 ASSESSMENT — FIBROSIS 4 INDEX: FIB4 SCORE: 1.79

## 2025-07-01 ENCOUNTER — APPOINTMENT (OUTPATIENT)
Dept: MEDICAL GROUP | Facility: PHYSICIAN GROUP | Age: 75
End: 2025-07-01
Payer: MEDICARE

## 2025-07-01 VITALS
WEIGHT: 212.6 LBS | BODY MASS INDEX: 29.76 KG/M2 | HEART RATE: 71 BPM | SYSTOLIC BLOOD PRESSURE: 100 MMHG | OXYGEN SATURATION: 94 % | TEMPERATURE: 97.3 F | DIASTOLIC BLOOD PRESSURE: 60 MMHG | HEIGHT: 71 IN

## 2025-07-01 DIAGNOSIS — R35.1 BENIGN PROSTATIC HYPERPLASIA WITH NOCTURIA: ICD-10-CM

## 2025-07-01 DIAGNOSIS — N18.31 CHRONIC KIDNEY DISEASE (CKD) STAGE G3A/A1, MODERATELY DECREASED GLOMERULAR FILTRATION RATE (GFR) BETWEEN 45-59 ML/MIN/1.73 SQUARE METER AND ALBUMINURIA CREATININE RATIO LESS THAN 30 MG/G: ICD-10-CM

## 2025-07-01 DIAGNOSIS — E78.00 PURE HYPERCHOLESTEROLEMIA: ICD-10-CM

## 2025-07-01 DIAGNOSIS — I70.0 AORTIC ATHEROSCLEROSIS (HCC): ICD-10-CM

## 2025-07-01 DIAGNOSIS — Z12.12 SCREENING FOR COLORECTAL CANCER: ICD-10-CM

## 2025-07-01 DIAGNOSIS — N40.1 BENIGN PROSTATIC HYPERPLASIA WITH NOCTURIA: ICD-10-CM

## 2025-07-01 DIAGNOSIS — I25.2 HISTORY OF MYOCARDIAL INFARCTION: ICD-10-CM

## 2025-07-01 DIAGNOSIS — Z12.11 SCREENING FOR COLORECTAL CANCER: ICD-10-CM

## 2025-07-01 DIAGNOSIS — Z12.5 ENCOUNTER FOR SCREENING FOR MALIGNANT NEOPLASM OF PROSTATE: ICD-10-CM

## 2025-07-01 DIAGNOSIS — R10.84 GENERALIZED ABDOMINAL PAIN: Primary | ICD-10-CM

## 2025-07-01 PROCEDURE — 99214 OFFICE O/P EST MOD 30 MIN: CPT

## 2025-07-01 PROCEDURE — 3074F SYST BP LT 130 MM HG: CPT

## 2025-07-01 PROCEDURE — 3078F DIAST BP <80 MM HG: CPT

## 2025-07-01 ASSESSMENT — ENCOUNTER SYMPTOMS
FEVER: 0
WEIGHT LOSS: 0
NAUSEA: 0
CHILLS: 0
HEADACHES: 0
WEAKNESS: 0
MYALGIAS: 0
DIARRHEA: 0
DIZZINESS: 0
CONSTIPATION: 0
SHORTNESS OF BREATH: 0
ABDOMINAL PAIN: 1
BLURRED VISION: 0
COUGH: 0
VOMITING: 0

## 2025-07-01 ASSESSMENT — PATIENT HEALTH QUESTIONNAIRE - PHQ9: CLINICAL INTERPRETATION OF PHQ2 SCORE: 0

## 2025-07-01 ASSESSMENT — FIBROSIS 4 INDEX: FIB4 SCORE: 1.79

## 2025-07-01 NOTE — PROGRESS NOTES
Verbal consent was acquired by the patient to use [employer] ambient listening note generation during this visit  Subjective:     CC:  The primary encounter diagnosis was Generalized abdominal pain. Diagnoses of Encounter for screening for malignant neoplasm of prostate, Screening for colorectal cancer, Chronic kidney disease (CKD) stage G3a/A1, moderately decreased glomerular filtration rate (GFR) between 45-59 mL/min/1.73 square meter and albuminuria creatinine ratio less than 30 mg/g, Aortic atherosclerosis (HCC), Benign prostatic hyperplasia with nocturia, History of myocardial infarction, and Pure hypercholesterolemia were also pertinent to this visit.    HISTORY OF THE PRESENT ILLNESS: Patient is a 74 y.o. male.   Problem   Generalized Abdominal Pain   Chronic Kidney Disease (Ckd) Stage G3a/A1, Moderately Decreased Glomerular Filtration Rate (Gfr) Between 45-59 Ml/Min/1.73 Square Meter and Albuminuria Creatinine Ratio Less Than 30 Mg/G       History of Present Illness  The patient presents for evaluation of gastrointestinal issues, renal function, and prostate health.    Gastrointestinal Issues  - The patient reports experiencing myalgia on Sunday night, which disrupted his sleep but has since resolved.  - He denies any recent strenuous activity or Valsalva maneuvers that could have precipitated this discomfort.  - Over the past year, he has experienced four episodes of gastrointestinal obstruction, each causing significant pain and necessitating rest.  - These episodes are characterized by the sudden onset of pain, rated at 6 out of 10 in intensity, and are severe enough to require isolation.  - The pain typically resolves within 30 minutes to an hour following the administration of polyethylene glycol (MiraLAX), which facilitates bowel movements.  - His bowel habits range from loose stools to slightly strained defecation, but are predominantly normal.  - He denies constipation, frequent obstructions,  nausea, vomiting, or anorexia.  - His last colonoscopy was performed 10 years ago, and he is due for another screening.  - He does not take probiotics or multivitamins.  - He retains his gallbladder and appendix.  - He reports a single episode of hemorrhoids many years ago, which resolved with topical treatment.    Dietary Habits  - The patient maintains a balanced diet with occasional snacks, including greens and vegetables.  - His dairy intake is minimal, with cheese consumption limited to once a week.  - He consistently consumes bread, with one slice in the morning and sometimes a sandwich, totaling approximately three slices per day.  - He does not engage in abdominal exercises but focuses on upper body workouts.    Cardiovascular Health  - The patient had an 18-month follow-up with his cardiologist, with all parameters within acceptable ranges.  - He is on chronic therapy with beta-blockers, statins, and anticoagulants, likely for life.  - He has a history of coronary artery disease, with three stents placed following a myocardial infarction in November 2023.  - One stent was placed initially, followed by two additional stents four weeks later.  - He reports doing well since these interventions.    Prostate Health  - The patient takes tamsulosin twice before bed, which provides symptomatic relief.  - He still experiences nocturia, typically rising at least twice per night.  - On a good night, he rises only once, and on a bad night, up to three times.    Renal Health  - He has a history of nephrolithiasis but has not had any episodes in the past 10 years.    Supplemental information: The patient had three stents placed following a myocardial infarction in November 2023.    ROS:   Review of Systems   Constitutional:  Negative for chills, fever, malaise/fatigue and weight loss.   Eyes:  Negative for blurred vision.   Respiratory:  Negative for cough and shortness of breath.    Cardiovascular:  Negative for chest  "pain.   Gastrointestinal:  Positive for abdominal pain. Negative for constipation, diarrhea, nausea and vomiting.   Musculoskeletal:  Negative for myalgias.   Neurological:  Negative for dizziness, weakness and headaches.         Objective:     Exam: /60 (BP Location: Left arm, Patient Position: Sitting, BP Cuff Size: Adult)   Pulse 71   Temp 36.3 °C (97.3 °F) (Temporal)   Ht 1.803 m (5' 11\")   Wt 96.4 kg (212 lb 9.6 oz)   SpO2 94%  Body mass index is 29.65 kg/m².    Physical Exam  Constitutional:       Appearance: Normal appearance.   HENT:      Head: Normocephalic.   Eyes:      Conjunctiva/sclera: Conjunctivae normal.      Pupils: Pupils are equal, round, and reactive to light.   Cardiovascular:      Rate and Rhythm: Normal rate and regular rhythm.      Heart sounds: No murmur heard.  Pulmonary:      Effort: Pulmonary effort is normal. No respiratory distress.      Breath sounds: Normal breath sounds.   Abdominal:      General: Bowel sounds are normal. There is no distension.      Palpations: Abdomen is soft.      Tenderness: There is abdominal tenderness (lower periumbilical region).   Musculoskeletal:         General: Normal range of motion.   Skin:     General: Skin is warm and dry.   Neurological:      General: No focal deficit present.      Mental Status: He is alert and oriented to person, place, and time.   Psychiatric:         Mood and Affect: Mood normal.         Behavior: Behavior normal.           Labs:   No visits with results within 1 Month(s) from this visit.   Latest known visit with results is:   Hospital Outpatient Visit on 05/27/2025   Component Date Value    Cholesterol,Tot 05/27/2025 116     Triglycerides 05/27/2025 82     HDL 05/27/2025 52     LDL 05/27/2025 48     Sodium 05/27/2025 143     Potassium 05/27/2025 4.4     Chloride 05/27/2025 110     Co2 05/27/2025 22     Glucose 05/27/2025 97     Bun 05/27/2025 24 (H)     Creatinine 05/27/2025 1.28     Calcium 05/27/2025 8.9     Anion " Gap 05/27/2025 11.0     Fasting Status 05/27/2025 Fasting     GFR (CKD-EPI) 05/27/2025 59 (A)          Assessment & Plan: Medical Decision Making   74 y.o. male with the following -    Assessment & Plan  1. Irritable Bowel Syndrome.  - Symptoms suggest a possible diagnosis of irritable bowel syndrome, characterized by colicky pain that subsides post-bowel movement.  - A probiotic supplement such as [employer] Advantage was recommended to support gut health.  - Dietary modifications were suggested, including the elimination of bread and gluten products for a trial period of 1 to 2 weeks to observe any changes in symptoms. Low Fod Map Diet handout given and explained.  - A list of foods to avoid and those that can be consumed was provided. A referral for a colonoscopy was made to further investigate the cause of the symptoms.    2. Chronic Kidney Disease.  - Kidney function is slightly decreased at 59.  - Advised to increase water intake and avoid nonsteroidal anti-inflammatory products like meloxicam.  - Kidney function will be monitored, and if it continues to decline, medication adjustments may be necessary.  - History of kidney stones, no recurrence in the past 10 years.    3. BPH with nocturia.  - Prostate screening test was 1.66 in November.  - A prostate screening test will be added to the November lab work to monitor prostate health.  - Patient is currently taking tamsulosin 0.4 mg daily, which helps manage symptoms but still experiences nocturia at least 2 times per night.    4. Hyperlipidemia.  - Cholesterol levels are excellent.  - Continue current medication regimen including metoprolol 25 mg daily, Rosuvastatin 40 mg daily, and plavix 75 mg daily  - Cardiologist is monitoring cholesterol and metabolic panels.    5. Coronary Artery Disease.  - History of three stents placed following a heart attack in November 2023.  - Continue current medication regimen including beta blockers, statins, and blood  thinners.  - Cardiologist follow-up showed good range in cardiovascular health.      Problem List Items Addressed This Visit       Pure hypercholesterolemia    Benign prostatic hyperplasia with nocturia    Aortic atherosclerosis (HCC)    History of myocardial infarction    Generalized abdominal pain - Primary    Chronic kidney disease (CKD) stage G3a/A1, moderately decreased glomerular filtration rate (GFR) between 45-59 mL/min/1.73 square meter and albuminuria creatinine ratio less than 30 mg/g     Other Visit Diagnoses         Encounter for screening for malignant neoplasm of prostate        Relevant Orders    PROSTATE SPECIFIC AG SCREENING      Screening for colorectal cancer        Relevant Orders    Referral to GI for Colonoscopy            Differential diagnosis, natural history, supportive care, and indications for immediate follow-up discussed.  Shared decision making approach was utilized, and patient is amendable with plan of care.  Patient understands to return to clinic or go to the emergency department if symptoms worsen. All questions and concerns addressed to the best of my knowledge.      Return if symptoms worsen or fail to improve.    Please note that this dictation was created using voice recognition software. I have made every reasonable attempt to correct obvious errors, but I expect that there are errors of grammar and possibly content that I did not discover before finalizing the note.

## 2025-07-08 DIAGNOSIS — N52.8 OTHER MALE ERECTILE DYSFUNCTION: ICD-10-CM

## 2025-07-08 RX ORDER — SILDENAFIL 100 MG/1
100 TABLET, FILM COATED ORAL
Qty: 30 TABLET | Refills: 3 | Status: SHIPPED | OUTPATIENT
Start: 2025-07-08 | End: 2025-10-16

## 2025-07-08 NOTE — TELEPHONE ENCOUNTER
Received request via: Pharmacy    Was the patient seen in the last year in this department? Yes    Does the patient have an active prescription (recently filled or refills available) for medication(s) requested? No    [facility]    Does the patient have assisted Plus and need 100-day supply? (This applies to ALL medications) Patient does not have SCP

## 2025-07-09 NOTE — Clinical Note
REFERRAL APPROVAL NOTICE         Sent on July 9, 2025                   [place] [facility] [place]  [address] [place] [address]                   Dear Karlos Clifton,    After a careful review of the medical information and benefit coverage, [facility] has processed your referral. See below for additional details.    If applicable, you must be actively enrolled with your insurance for coverage of the authorized service. If you have any questions regarding your coverage, please contact your insurance directly.    REFERRAL INFORMATION   Referral #:  [id number]  Referred-To Provider    Referred-By Provider:  Gastroenterology    Jake Hianes D.N.P.   [facility]      [address] [id number] [phone] [address] [place] [id number] [phone]    Referral Start Date:  07/01/2025  Referral End Date:   07/01/2026             SCHEDULING  If you do not already have an appointment, please call [phone] to make an appointment.     MORE INFORMATION  If you do not already have a [employer] account, sign up at: [email or web address]  You can access your medical information, make appointments, see lab results, billing information, and more.  If you have questions regarding this referral, please contact  the [facility] Referrals department at:             [phone]. Monday - Friday 8:00AM - 5:00PM.     Sincerely,    [facility]

## 2025-07-15 DIAGNOSIS — M79.641 PAIN IN BOTH HANDS: ICD-10-CM

## 2025-07-15 DIAGNOSIS — M79.642 PAIN IN BOTH HANDS: ICD-10-CM

## 2025-07-15 RX ORDER — MELOXICAM 15 MG/1
15 TABLET ORAL DAILY
Qty: 90 TABLET | Refills: 3 | Status: SHIPPED | OUTPATIENT
Start: 2025-07-15

## 2025-07-15 NOTE — TELEPHONE ENCOUNTER
Received request via: Pharmacy    Was the patient seen in the last year in this department? Yes    Does the patient have an active prescription (recently filled or refills available) for medication(s) requested? No    Pharmacy Name: [facility] [address]     Does the patient have [facility] Plus and need 100-day supply? (This applies to ALL medications) Patient does not have SCP

## 2025-08-22 DIAGNOSIS — N40.1 BENIGN PROSTATIC HYPERPLASIA WITH NOCTURIA: ICD-10-CM

## 2025-08-22 DIAGNOSIS — R35.1 BENIGN PROSTATIC HYPERPLASIA WITH NOCTURIA: ICD-10-CM

## 2025-08-22 RX ORDER — TAMSULOSIN HYDROCHLORIDE 0.4 MG/1
0.8 CAPSULE ORAL DAILY
Qty: 180 CAPSULE | Refills: 3 | Status: SHIPPED | OUTPATIENT
Start: 2025-08-22

## 2025-11-05 ENCOUNTER — APPOINTMENT (OUTPATIENT)
Dept: MEDICAL GROUP | Facility: PHYSICIAN GROUP | Age: 75
End: 2025-11-05
Payer: MEDICARE

## (undated) DEVICE — PACK LOWER EXTREMITY - (2/CA)

## (undated) DEVICE — SUCTION INSTRUMENT YANKAUER BULBOUS TIP W/O VENT (50EA/CA)

## (undated) DEVICE — CONTAINER SPECIMEN BAG OR - STERILE 4 OZ W/LID (100EA/CA)

## (undated) DEVICE — LACTATED RINGERS INJ 1000 ML - (14EA/CA 60CA/PF)

## (undated) DEVICE — CANISTER SUCTION 3000ML MECHANICAL FILTER AUTO SHUTOFF MEDI-VAC NONSTERILE LF DISP  (40EA/CA)

## (undated) DEVICE — PROTECTOR ULNA NERVE - (36PR/CA)

## (undated) DEVICE — SENSOR SPO2 NEO LNCS ADHESIVE (20/BX) SEE USER NOTES

## (undated) DEVICE — NEPTUNE 4 PORT MANIFOLD - (20/PK)

## (undated) DEVICE — TUBE CONNECT SUCTION CLEAR 120 X 1/4" (50EA/CA)"

## (undated) DEVICE — TOURNIQUET CUFF 34 X 4 ONE PORT DISP - STERILE (10/BX)

## (undated) DEVICE — SEAL 5MM-8MM UNIVERSAL  BOX OF 10

## (undated) DEVICE — SET TUBING PNEUMOCLEAR HIGH FLOW SMOKE EVACUATION (10EA/BX)

## (undated) DEVICE — SET LEADWIRE 5 LEAD BEDSIDE DISPOSABLE ECG (1SET OF 5/EA)

## (undated) DEVICE — ELECTRODE 850 FOAM ADHESIVE - HYDROGEL RADIOTRNSPRNT (50/PK)

## (undated) DEVICE — COVER TIP ENDOWRIST HOT SHEAR - (10EA/BX) DA VINCI

## (undated) DEVICE — BANDAGE ELASTIC 6 HONEYCOMB - 6X5YD LF (20/CA)"

## (undated) DEVICE — MASK ANESTHESIA ADULT  - (100/CA)

## (undated) DEVICE — KIT ROOM DECONTAMINATION

## (undated) DEVICE — KIT ANESTHESIA W/CIRCUIT & 3/LT BAG W/FILTER (20EA/CA)

## (undated) DEVICE — TOWEL STOP TIMEOUT SAFETY FLAG (40EA/CA)

## (undated) DEVICE — TUBING CLEARLINK DUO-VENT - C-FLO (48EA/CA)

## (undated) DEVICE — SLEEVE, VASO, THIGH, MED

## (undated) DEVICE — SOD. CHL. INJ. 0.9% 1000 ML - (14EA/CA 60CA/PF)

## (undated) DEVICE — SUTURE ETHILON 2-0 FSLX 30 (36PK/BX)"

## (undated) DEVICE — ELECTRODE DUAL RETURN W/ CORD - (50/PK)

## (undated) DEVICE — HEAD HOLDER JUNIOR/ADULT

## (undated) DEVICE — GLOVE BIOGEL INDICATOR SZ 8 SURGICAL PF LTX - (50/BX 4BX/CA)

## (undated) DEVICE — Device

## (undated) DEVICE — SET EXTENSION WITH 2 PORTS (48EA/CA) ***PART #2C8610 IS A SUBSTITUTE*****

## (undated) DEVICE — TROCAR 5X100 NON BLADED Z-TH - READ KII (6/BX)

## (undated) DEVICE — OBTURATOR BLADELESS STANDARD 8MM (6EA/BX)

## (undated) DEVICE — WRAP COBAN SELF-ADHERENT 6 IN X  5YDS STERILE TAN (12/CA)

## (undated) DEVICE — SUTURE 2-0 PDS II CT-2 - (36/BX)

## (undated) DEVICE — CHLORAPREP 26 ML APPLICATOR - ORANGE TINT(25/CA)

## (undated) DEVICE — GOWN WARMING STANDARD FLEX - (30/CA)

## (undated) DEVICE — SUTURE 4-0 MONOCRYL PLUS PS-2 - 27 INCH (36/BX)

## (undated) DEVICE — SUTURE 2-0 20CM STRATAFIX SPIRAL SH NEEDLE (12/BX)

## (undated) DEVICE — GLOVE BIOGEL INDICATOR SZ 8.5 SURGICAL PF LTX - (50/BX 4BX/CA)

## (undated) DEVICE — SODIUM CHL IRRIGATION 0.9% 1000ML (12EA/CA)

## (undated) DEVICE — DRAPE COLUMN  BOX OF 20

## (undated) DEVICE — PADDING CAST 6 IN STERILE - 6 X 4 YDS (24/CA)

## (undated) DEVICE — SUTURE GENERAL

## (undated) DEVICE — TRAY SKIN SCRUB PVP WET (20EA/CA) PART #DYND70356 DISCONTINUED

## (undated) DEVICE — BLADE SURGICAL #15 - (50/BX 3BX/CA)

## (undated) DEVICE — GLOVE BIOGEL SZ 8 SURGICAL PF LTX - (50PR/BX 4BX/CA)

## (undated) DEVICE — SODIUM CHL. IRRIGATION 0.9% 3000ML (4EA/CA 65CA/PF)

## (undated) DEVICE — BLADE SURGICAL CLIPPER - (50EA/CA)

## (undated) DEVICE — DRESSING KIT V.A.C. SENSA T.R.A.C. SMALL (10EA/CA)

## (undated) DEVICE — DRAPE ARM  BOX OF 20

## (undated) DEVICE — GLOVE SZ 7.5 BIOGEL PI MICRO - PF LF (50PR/BX)

## (undated) DEVICE — SYSTEM CLEARIFY VISUALIZATION (10EA/PK)

## (undated) DEVICE — SET IRRIGATION CYSTOSCOPY TUBE L80 IN (20EA/CA)

## (undated) DEVICE — TUBE NG SALEM SUMP 16FR (50EA/CA)

## (undated) DEVICE — FORCEP BIPOLAR MARYLAND DA VINCI 10X'S REUSABLE (10UN/EA)

## (undated) DEVICE — ROBOTIC SURGERY SERVICES

## (undated) DEVICE — SHEARS MONOPOLAR CURVED  DA VINCI 10X'S REUSABLE

## (undated) DEVICE — NEEDLE DRIVER MEGA SUTURECUT DA VINCI 15X'S REUSABLE

## (undated) DEVICE — GLOVE BIOGEL PI INDICATOR SZ 8.0 SURGICAL PF LF -(50/BX 4BX/CA)

## (undated) DEVICE — SUTURE 3-0 ETHILON PS-1 (36PK/BX)